# Patient Record
Sex: FEMALE | Race: WHITE | Employment: FULL TIME | URBAN - METROPOLITAN AREA
[De-identification: names, ages, dates, MRNs, and addresses within clinical notes are randomized per-mention and may not be internally consistent; named-entity substitution may affect disease eponyms.]

---

## 2018-07-09 ENCOUNTER — HOSPITAL ENCOUNTER (INPATIENT)
Facility: HOSPITAL | Age: 53
LOS: 5 days | Discharge: HOME | DRG: 853 | End: 2018-07-14
Admitting: HOSPITALIST
Payer: COMMERCIAL

## 2018-07-09 ENCOUNTER — APPOINTMENT (OUTPATIENT)
Dept: CT IMAGING | Facility: HOSPITAL | Age: 53
DRG: 853 | End: 2018-07-09
Payer: COMMERCIAL

## 2018-07-09 DIAGNOSIS — K37 APPENDICITIS: ICD-10-CM

## 2018-07-09 DIAGNOSIS — K35.30 ACUTE APPENDICITIS WITH LOCALIZED PERITONITIS: Primary | ICD-10-CM

## 2018-07-09 PROBLEM — R73.9 HYPERGLYCEMIA: Status: ACTIVE | Noted: 2018-07-09

## 2018-07-09 PROBLEM — K35.32 PERFORATED APPENDICITIS: Status: ACTIVE | Noted: 2018-07-09

## 2018-07-09 LAB
ALBUMIN SERPL BCP-MCNC: 4.2 G/DL (ref 3.5–4.8)
ALBUMIN/GLOB SERPL: 1.5 {RATIO} (ref 1–2)
ALP SERPL-CCNC: 50 U/L (ref 32–100)
ALT SERPL-CCNC: 34 U/L (ref 14–54)
ANION GAP SERPL CALC-SCNC: 13 MMOL/L (ref 0–18)
AST SERPL-CCNC: 28 U/L (ref 15–41)
B-HCG UR QL: NEGATIVE
BACTERIA UR QL AUTO: NEGATIVE /HPF
BASOPHILS # BLD: 0.1 K/UL (ref 0–0.2)
BASOPHILS NFR BLD: 1 %
BILIRUB SERPL-MCNC: 1.2 MG/DL (ref 0.3–1.2)
BILIRUB UR QL: NEGATIVE
BUN SERPL-MCNC: 15 MG/DL (ref 8–20)
BUN/CREAT SERPL: 14.2 (ref 10–20)
CALCIUM SERPL-MCNC: 9.1 MG/DL (ref 8.5–10.5)
CHLORIDE SERPL-SCNC: 102 MMOL/L (ref 95–110)
CLARITY UR: CLEAR
CO2 SERPL-SCNC: 19 MMOL/L (ref 22–32)
COLOR UR: YELLOW
CREAT SERPL-MCNC: 1.06 MG/DL (ref 0.5–1.5)
EOSINOPHIL # BLD: 0 K/UL (ref 0–0.7)
EOSINOPHIL NFR BLD: 0 %
ERYTHROCYTE [DISTWIDTH] IN BLOOD BY AUTOMATED COUNT: 13.1 % (ref 11–15)
GLOBULIN PLAS-MCNC: 2.8 G/DL (ref 2.5–3.7)
GLUCOSE SERPL-MCNC: 144 MG/DL (ref 70–99)
GLUCOSE UR-MCNC: NEGATIVE MG/DL
HCT VFR BLD AUTO: 44.2 % (ref 35–48)
HGB BLD-MCNC: 15.3 G/DL (ref 12–16)
KETONES UR-MCNC: NEGATIVE MG/DL
LEUKOCYTE ESTERASE UR QL STRIP.AUTO: NEGATIVE
LYMPHOCYTES # BLD: 0.9 K/UL (ref 1–4)
LYMPHOCYTES NFR BLD: 5 %
MCH RBC QN AUTO: 30.2 PG (ref 27–32)
MCHC RBC AUTO-ENTMCNC: 34.7 G/DL (ref 32–37)
MCV RBC AUTO: 87 FL (ref 80–100)
MONOCYTES # BLD: 0.8 K/UL (ref 0–1)
MONOCYTES NFR BLD: 5 %
NEUTROPHILS # BLD AUTO: 15 K/UL (ref 1.8–7.7)
NEUTROPHILS NFR BLD: 90 %
NITRITE UR QL STRIP.AUTO: NEGATIVE
OSMOLALITY UR CALC.SUM OF ELEC: 281 MOSM/KG (ref 275–295)
PH UR: 8 [PH] (ref 5–8)
PLATELET # BLD AUTO: 177 K/UL (ref 140–400)
PMV BLD AUTO: 10.4 FL (ref 7.4–10.3)
POTASSIUM SERPL-SCNC: 3.5 MMOL/L (ref 3.3–5.1)
PROT SERPL-MCNC: 7 G/DL (ref 5.9–8.4)
PROT UR-MCNC: 30 MG/DL
RBC # BLD AUTO: 5.08 M/UL (ref 3.7–5.4)
RBC #/AREA URNS AUTO: 15 /HPF
SODIUM SERPL-SCNC: 134 MMOL/L (ref 136–144)
SP GR UR STRIP: 1.02 (ref 1–1.03)
UROBILINOGEN UR STRIP-ACNC: <2
VIT C UR-MCNC: NEGATIVE MG/DL
WBC # BLD AUTO: 16.7 K/UL (ref 4–11)
WBC #/AREA URNS AUTO: <1 /HPF

## 2018-07-09 PROCEDURE — 80053 COMPREHEN METABOLIC PANEL: CPT

## 2018-07-09 PROCEDURE — 96375 TX/PRO/DX INJ NEW DRUG ADDON: CPT

## 2018-07-09 PROCEDURE — 96365 THER/PROPH/DIAG IV INF INIT: CPT

## 2018-07-09 PROCEDURE — 85025 COMPLETE CBC W/AUTO DIFF WBC: CPT

## 2018-07-09 PROCEDURE — 81001 URINALYSIS AUTO W/SCOPE: CPT

## 2018-07-09 PROCEDURE — 74176 CT ABD & PELVIS W/O CONTRAST: CPT

## 2018-07-09 PROCEDURE — 81025 URINE PREGNANCY TEST: CPT

## 2018-07-09 PROCEDURE — 96361 HYDRATE IV INFUSION ADD-ON: CPT

## 2018-07-09 PROCEDURE — 99285 EMERGENCY DEPT VISIT HI MDM: CPT

## 2018-07-09 RX ORDER — DEXTROSE, SODIUM CHLORIDE, AND POTASSIUM CHLORIDE 5; .45; .15 G/100ML; G/100ML; G/100ML
INJECTION INTRAVENOUS CONTINUOUS
Status: DISCONTINUED | OUTPATIENT
Start: 2018-07-10 | End: 2018-07-10

## 2018-07-09 RX ORDER — ACETAMINOPHEN 500 MG
TABLET ORAL
Status: COMPLETED
Start: 2018-07-09 | End: 2018-07-09

## 2018-07-09 RX ORDER — ONDANSETRON 2 MG/ML
4 INJECTION INTRAMUSCULAR; INTRAVENOUS EVERY 4 HOURS PRN
Status: DISCONTINUED | OUTPATIENT
Start: 2018-07-09 | End: 2018-07-10

## 2018-07-09 RX ORDER — ONDANSETRON 2 MG/ML
4 INJECTION INTRAMUSCULAR; INTRAVENOUS EVERY 6 HOURS PRN
Status: DISCONTINUED | OUTPATIENT
Start: 2018-07-09 | End: 2018-07-10

## 2018-07-09 RX ORDER — ONDANSETRON 2 MG/ML
4 INJECTION INTRAMUSCULAR; INTRAVENOUS ONCE
Status: COMPLETED | OUTPATIENT
Start: 2018-07-09 | End: 2018-07-09

## 2018-07-09 RX ORDER — MORPHINE SULFATE 2 MG/ML
3 INJECTION, SOLUTION INTRAMUSCULAR; INTRAVENOUS EVERY 2 HOUR PRN
Status: DISCONTINUED | OUTPATIENT
Start: 2018-07-09 | End: 2018-07-10

## 2018-07-09 RX ORDER — SODIUM CHLORIDE 9 MG/ML
INJECTION, SOLUTION INTRAVENOUS CONTINUOUS
Status: ACTIVE | OUTPATIENT
Start: 2018-07-10 | End: 2018-07-10

## 2018-07-09 RX ORDER — MORPHINE SULFATE 4 MG/ML
4 INJECTION, SOLUTION INTRAMUSCULAR; INTRAVENOUS EVERY 2 HOUR PRN
Status: DISCONTINUED | OUTPATIENT
Start: 2018-07-09 | End: 2018-07-10

## 2018-07-09 RX ORDER — FAMOTIDINE 10 MG/ML
20 INJECTION, SOLUTION INTRAVENOUS EVERY 12 HOURS
Status: DISCONTINUED | OUTPATIENT
Start: 2018-07-10 | End: 2018-07-14

## 2018-07-09 RX ORDER — MORPHINE SULFATE 4 MG/ML
4 INJECTION, SOLUTION INTRAMUSCULAR; INTRAVENOUS ONCE
Status: COMPLETED | OUTPATIENT
Start: 2018-07-09 | End: 2018-07-09

## 2018-07-09 RX ORDER — MORPHINE SULFATE 4 MG/ML
INJECTION, SOLUTION INTRAMUSCULAR; INTRAVENOUS
Status: COMPLETED
Start: 2018-07-09 | End: 2018-07-09

## 2018-07-09 RX ORDER — ONDANSETRON 2 MG/ML
INJECTION INTRAMUSCULAR; INTRAVENOUS
Status: COMPLETED
Start: 2018-07-09 | End: 2018-07-09

## 2018-07-09 RX ORDER — ACETAMINOPHEN 500 MG
1000 TABLET ORAL ONCE
Status: COMPLETED | OUTPATIENT
Start: 2018-07-09 | End: 2018-07-09

## 2018-07-10 ENCOUNTER — ANESTHESIA (OUTPATIENT)
Dept: SURGERY | Facility: HOSPITAL | Age: 53
DRG: 853 | End: 2018-07-10
Payer: COMMERCIAL

## 2018-07-10 ENCOUNTER — ANESTHESIA EVENT (OUTPATIENT)
Dept: SURGERY | Facility: HOSPITAL | Age: 53
DRG: 853 | End: 2018-07-10
Payer: COMMERCIAL

## 2018-07-10 ENCOUNTER — SURGERY (OUTPATIENT)
Age: 53
End: 2018-07-10

## 2018-07-10 LAB
ANION GAP SERPL CALC-SCNC: 8 MMOL/L (ref 0–18)
BASOPHILS # BLD: 0 K/UL (ref 0–0.2)
BASOPHILS NFR BLD: 0 %
BUN SERPL-MCNC: 12 MG/DL (ref 8–20)
BUN/CREAT SERPL: 13.3 (ref 10–20)
CALCIUM SERPL-MCNC: 8.4 MG/DL (ref 8.5–10.5)
CHLORIDE SERPL-SCNC: 102 MMOL/L (ref 95–110)
CO2 SERPL-SCNC: 26 MMOL/L (ref 22–32)
CREAT SERPL-MCNC: 0.9 MG/DL (ref 0.5–1.5)
EOSINOPHIL # BLD: 0 K/UL (ref 0–0.7)
EOSINOPHIL NFR BLD: 0 %
ERYTHROCYTE [DISTWIDTH] IN BLOOD BY AUTOMATED COUNT: 13.3 % (ref 11–15)
GLUCOSE SERPL-MCNC: 140 MG/DL (ref 70–99)
HCT VFR BLD AUTO: 39.2 % (ref 35–48)
HGB BLD-MCNC: 13.3 G/DL (ref 12–16)
LYMPHOCYTES # BLD: 1.4 K/UL (ref 1–4)
LYMPHOCYTES NFR BLD: 9 %
MAGNESIUM SERPL-MCNC: 1.8 MG/DL (ref 1.8–2.5)
MCH RBC QN AUTO: 29.9 PG (ref 27–32)
MCHC RBC AUTO-ENTMCNC: 33.9 G/DL (ref 32–37)
MCV RBC AUTO: 88.2 FL (ref 80–100)
MONOCYTES # BLD: 0.8 K/UL (ref 0–1)
MONOCYTES NFR BLD: 5 %
NEUTROPHILS # BLD AUTO: 12.6 K/UL (ref 1.8–7.7)
NEUTROPHILS NFR BLD: 85 %
OSMOLALITY UR CALC.SUM OF ELEC: 284 MOSM/KG (ref 275–295)
PHOSPHATE SERPL-MCNC: 3.7 MG/DL (ref 2.4–4.7)
PLATELET # BLD AUTO: 136 K/UL (ref 140–400)
PMV BLD AUTO: 10.4 FL (ref 7.4–10.3)
POTASSIUM SERPL-SCNC: 4.2 MMOL/L (ref 3.3–5.1)
RBC # BLD AUTO: 4.45 M/UL (ref 3.7–5.4)
SODIUM SERPL-SCNC: 136 MMOL/L (ref 136–144)
WBC # BLD AUTO: 14.7 K/UL (ref 4–11)

## 2018-07-10 PROCEDURE — 83735 ASSAY OF MAGNESIUM: CPT | Performed by: SURGERY

## 2018-07-10 PROCEDURE — 0DTJ4ZZ RESECTION OF APPENDIX, PERCUTANEOUS ENDOSCOPIC APPROACH: ICD-10-PCS | Performed by: SURGERY

## 2018-07-10 PROCEDURE — 84100 ASSAY OF PHOSPHORUS: CPT | Performed by: SURGERY

## 2018-07-10 PROCEDURE — 88304 TISSUE EXAM BY PATHOLOGIST: CPT | Performed by: SURGERY

## 2018-07-10 PROCEDURE — S0028 INJECTION, FAMOTIDINE, 20 MG: HCPCS | Performed by: SURGERY

## 2018-07-10 PROCEDURE — 0DBH4ZZ EXCISION OF CECUM, PERCUTANEOUS ENDOSCOPIC APPROACH: ICD-10-PCS | Performed by: SURGERY

## 2018-07-10 PROCEDURE — 87076 CULTURE ANAEROBE IDENT EACH: CPT | Performed by: SURGERY

## 2018-07-10 PROCEDURE — 87070 CULTURE OTHR SPECIMN AEROBIC: CPT | Performed by: SURGERY

## 2018-07-10 PROCEDURE — 85025 COMPLETE CBC W/AUTO DIFF WBC: CPT | Performed by: SURGERY

## 2018-07-10 PROCEDURE — 87205 SMEAR GRAM STAIN: CPT | Performed by: SURGERY

## 2018-07-10 PROCEDURE — 80048 BASIC METABOLIC PNL TOTAL CA: CPT | Performed by: SURGERY

## 2018-07-10 PROCEDURE — A4216 STERILE WATER/SALINE, 10 ML: HCPCS

## 2018-07-10 PROCEDURE — 87075 CULTR BACTERIA EXCEPT BLOOD: CPT | Performed by: SURGERY

## 2018-07-10 PROCEDURE — 0W9H40Z DRAINAGE OF RETROPERITONEUM WITH DRAINAGE DEVICE, PERCUTANEOUS ENDOSCOPIC APPROACH: ICD-10-PCS | Performed by: SURGERY

## 2018-07-10 RX ORDER — ONDANSETRON 2 MG/ML
8 INJECTION INTRAMUSCULAR; INTRAVENOUS EVERY 6 HOURS PRN
Status: DISCONTINUED | OUTPATIENT
Start: 2018-07-10 | End: 2018-07-14

## 2018-07-10 RX ORDER — HEPARIN SODIUM 1000 [USP'U]/ML
INJECTION, SOLUTION INTRAVENOUS; SUBCUTANEOUS AS NEEDED
Status: DISCONTINUED | OUTPATIENT
Start: 2018-07-10 | End: 2018-07-10 | Stop reason: HOSPADM

## 2018-07-10 RX ORDER — HYDROMORPHONE HYDROCHLORIDE 1 MG/ML
0.4 INJECTION, SOLUTION INTRAMUSCULAR; INTRAVENOUS; SUBCUTANEOUS EVERY 5 MIN PRN
Status: DISCONTINUED | OUTPATIENT
Start: 2018-07-10 | End: 2018-07-10 | Stop reason: HOSPADM

## 2018-07-10 RX ORDER — ROCURONIUM BROMIDE 10 MG/ML
INJECTION, SOLUTION INTRAVENOUS AS NEEDED
Status: DISCONTINUED | OUTPATIENT
Start: 2018-07-10 | End: 2018-07-10 | Stop reason: SURG

## 2018-07-10 RX ORDER — DEXTROSE, SODIUM CHLORIDE, AND POTASSIUM CHLORIDE 5; .45; .15 G/100ML; G/100ML; G/100ML
INJECTION INTRAVENOUS CONTINUOUS
Status: DISCONTINUED | OUTPATIENT
Start: 2018-07-10 | End: 2018-07-14

## 2018-07-10 RX ORDER — SODIUM CHLORIDE, SODIUM LACTATE, POTASSIUM CHLORIDE, CALCIUM CHLORIDE 600; 310; 30; 20 MG/100ML; MG/100ML; MG/100ML; MG/100ML
INJECTION, SOLUTION INTRAVENOUS CONTINUOUS
Status: DISCONTINUED | OUTPATIENT
Start: 2018-07-10 | End: 2018-07-10 | Stop reason: HOSPADM

## 2018-07-10 RX ORDER — ACETAMINOPHEN 10 MG/ML
1000 INJECTION, SOLUTION INTRAVENOUS EVERY 6 HOURS
Status: DISCONTINUED | OUTPATIENT
Start: 2018-07-10 | End: 2018-07-10

## 2018-07-10 RX ORDER — ONDANSETRON 2 MG/ML
4 INJECTION INTRAMUSCULAR; INTRAVENOUS ONCE AS NEEDED
Status: DISCONTINUED | OUTPATIENT
Start: 2018-07-10 | End: 2018-07-10 | Stop reason: HOSPADM

## 2018-07-10 RX ORDER — SODIUM CHLORIDE, SODIUM LACTATE, POTASSIUM CHLORIDE, CALCIUM CHLORIDE 600; 310; 30; 20 MG/100ML; MG/100ML; MG/100ML; MG/100ML
INJECTION, SOLUTION INTRAVENOUS CONTINUOUS PRN
Status: DISCONTINUED | OUTPATIENT
Start: 2018-07-10 | End: 2018-07-10 | Stop reason: SURG

## 2018-07-10 RX ORDER — NEOSTIGMINE METHYLSULFATE 0.5 MG/ML
INJECTION INTRAVENOUS AS NEEDED
Status: DISCONTINUED | OUTPATIENT
Start: 2018-07-10 | End: 2018-07-10 | Stop reason: SURG

## 2018-07-10 RX ORDER — METOCLOPRAMIDE HYDROCHLORIDE 5 MG/ML
10 INJECTION INTRAMUSCULAR; INTRAVENOUS EVERY 8 HOURS PRN
Status: DISCONTINUED | OUTPATIENT
Start: 2018-07-10 | End: 2018-07-14

## 2018-07-10 RX ORDER — MIDAZOLAM HYDROCHLORIDE 1 MG/ML
INJECTION INTRAMUSCULAR; INTRAVENOUS AS NEEDED
Status: DISCONTINUED | OUTPATIENT
Start: 2018-07-10 | End: 2018-07-10 | Stop reason: SURG

## 2018-07-10 RX ORDER — HYDROCODONE BITARTRATE AND ACETAMINOPHEN 5; 325 MG/1; MG/1
1 TABLET ORAL AS NEEDED
Status: DISCONTINUED | OUTPATIENT
Start: 2018-07-10 | End: 2018-07-10 | Stop reason: HOSPADM

## 2018-07-10 RX ORDER — HYDROMORPHONE HYDROCHLORIDE 1 MG/ML
0.2 INJECTION, SOLUTION INTRAMUSCULAR; INTRAVENOUS; SUBCUTANEOUS EVERY 5 MIN PRN
Status: DISCONTINUED | OUTPATIENT
Start: 2018-07-10 | End: 2018-07-10 | Stop reason: HOSPADM

## 2018-07-10 RX ORDER — ACETAMINOPHEN 10 MG/ML
1000 INJECTION, SOLUTION INTRAVENOUS EVERY 6 HOURS
Status: DISCONTINUED | OUTPATIENT
Start: 2018-07-10 | End: 2018-07-14

## 2018-07-10 RX ORDER — DEXAMETHASONE SODIUM PHOSPHATE 4 MG/ML
VIAL (ML) INJECTION AS NEEDED
Status: DISCONTINUED | OUTPATIENT
Start: 2018-07-10 | End: 2018-07-10 | Stop reason: SURG

## 2018-07-10 RX ORDER — HEPARIN SODIUM 5000 [USP'U]/ML
5000 INJECTION, SOLUTION INTRAVENOUS; SUBCUTANEOUS EVERY 12 HOURS SCHEDULED
Status: DISCONTINUED | OUTPATIENT
Start: 2018-07-10 | End: 2018-07-14

## 2018-07-10 RX ORDER — NALOXONE HYDROCHLORIDE 0.4 MG/ML
80 INJECTION, SOLUTION INTRAMUSCULAR; INTRAVENOUS; SUBCUTANEOUS AS NEEDED
Status: DISCONTINUED | OUTPATIENT
Start: 2018-07-10 | End: 2018-07-10 | Stop reason: HOSPADM

## 2018-07-10 RX ORDER — GLYCOPYRROLATE 0.2 MG/ML
INJECTION INTRAMUSCULAR; INTRAVENOUS AS NEEDED
Status: DISCONTINUED | OUTPATIENT
Start: 2018-07-10 | End: 2018-07-10 | Stop reason: SURG

## 2018-07-10 RX ORDER — HYDROCODONE BITARTRATE AND ACETAMINOPHEN 5; 325 MG/1; MG/1
2 TABLET ORAL AS NEEDED
Status: DISCONTINUED | OUTPATIENT
Start: 2018-07-10 | End: 2018-07-10 | Stop reason: HOSPADM

## 2018-07-10 RX ORDER — ACETAMINOPHEN 10 MG/ML
INJECTION, SOLUTION INTRAVENOUS AS NEEDED
Status: DISCONTINUED | OUTPATIENT
Start: 2018-07-10 | End: 2018-07-10 | Stop reason: SURG

## 2018-07-10 RX ORDER — HYDROMORPHONE HYDROCHLORIDE 1 MG/ML
0.6 INJECTION, SOLUTION INTRAMUSCULAR; INTRAVENOUS; SUBCUTANEOUS EVERY 5 MIN PRN
Status: DISCONTINUED | OUTPATIENT
Start: 2018-07-10 | End: 2018-07-10 | Stop reason: HOSPADM

## 2018-07-10 RX ORDER — HALOPERIDOL 5 MG/ML
0.25 INJECTION INTRAMUSCULAR ONCE AS NEEDED
Status: DISCONTINUED | OUTPATIENT
Start: 2018-07-10 | End: 2018-07-10 | Stop reason: HOSPADM

## 2018-07-10 RX ORDER — ONDANSETRON 2 MG/ML
4 INJECTION INTRAMUSCULAR; INTRAVENOUS EVERY 6 HOURS PRN
Status: DISCONTINUED | OUTPATIENT
Start: 2018-07-10 | End: 2018-07-14

## 2018-07-10 RX ORDER — SODIUM CHLORIDE 0.9 % (FLUSH) 0.9 %
3 SYRINGE (ML) INJECTION AS NEEDED
Status: DISCONTINUED | OUTPATIENT
Start: 2018-07-10 | End: 2018-07-14

## 2018-07-10 RX ORDER — SODIUM CHLORIDE 9 MG/ML
INJECTION, SOLUTION INTRAVENOUS
Status: COMPLETED
Start: 2018-07-10 | End: 2018-07-10

## 2018-07-10 RX ORDER — BUPIVACAINE HYDROCHLORIDE AND EPINEPHRINE 2.5; 5 MG/ML; UG/ML
INJECTION, SOLUTION INFILTRATION; PERINEURAL AS NEEDED
Status: DISCONTINUED | OUTPATIENT
Start: 2018-07-10 | End: 2018-07-10 | Stop reason: HOSPADM

## 2018-07-10 RX ADMIN — ROCURONIUM BROMIDE 50 MG: 10 INJECTION, SOLUTION INTRAVENOUS at 09:31:00

## 2018-07-10 RX ADMIN — SODIUM CHLORIDE, SODIUM LACTATE, POTASSIUM CHLORIDE, CALCIUM CHLORIDE: 600; 310; 30; 20 INJECTION, SOLUTION INTRAVENOUS at 09:21:00

## 2018-07-10 RX ADMIN — ACETAMINOPHEN 1000 MG: 10 INJECTION, SOLUTION INTRAVENOUS at 10:26:00

## 2018-07-10 RX ADMIN — GLYCOPYRROLATE 0.8 MG: 0.2 INJECTION INTRAMUSCULAR; INTRAVENOUS at 10:30:00

## 2018-07-10 RX ADMIN — MIDAZOLAM HYDROCHLORIDE 2 MG: 1 INJECTION INTRAMUSCULAR; INTRAVENOUS at 09:21:00

## 2018-07-10 RX ADMIN — ROCURONIUM BROMIDE 10 MG: 10 INJECTION, SOLUTION INTRAVENOUS at 10:04:00

## 2018-07-10 RX ADMIN — ONDANSETRON 4 MG: 2 INJECTION INTRAMUSCULAR; INTRAVENOUS at 10:19:00

## 2018-07-10 RX ADMIN — DEXAMETHASONE SODIUM PHOSPHATE 4 MG: 4 MG/ML VIAL (ML) INJECTION at 09:44:00

## 2018-07-10 RX ADMIN — NEOSTIGMINE METHYLSULFATE 5 MG: 0.5 INJECTION INTRAVENOUS at 10:30:00

## 2018-07-10 NOTE — CONSULTS
[facility]    Report of Consultation    Kvng Xie Patient Status:  Inpatient    1965 MRN [id number]   Location [facility] PRE OP RECOVERY Attending Ann Marie Garcia MD   Hosp Day # 1 PCP Erica Cuevas MD     Date of Admis [MAR Hold] ondansetron HCl (ZOFRAN) injection 4 mg, 4 mg, Intravenous, Q4H PRN  •  dextrose 5 % and 0.45 % NaCl with KCl 20 mEq infusion, , Intravenous, Continuous  •  [MAR Hold] famoTIDine (PEPCID) injection 20 mg, 20 mg, Intravenous, 2 times per day  • 07/09/2018   ALT 34 07/09/2018   INR 1.0 10/16/2008   T4F 0.81 01/26/2016   TSH 2.630 04/06/2016   MG 1.8 07/10/2018   PHOS 3.7 07/10/2018       Ct Abdomen Kidney Stone (no Iv) Em ([id number])    Result Date: 7/9/2018  CONCLUSION:  1.  Acute appendicitis wit hollow viscus, vascular injury, need to convert to an open procedure, 5-10% incidence of normal appendix at the time of exploration, as well as risk of anesthesia including myocardial infarction, respiratory failure, renal failure, pulmonary and was, DVT,

## 2018-07-10 NOTE — ANESTHESIA PROCEDURE NOTES
Airway  Date/Time: 7/10/2018 9:25 AM  Urgency: elective      General Information and Staff    Patient location during procedure: OR  Anesthesiologist: SUKUMAR Juarez  Performed: anesthesiologist     Indications and Patient Condition  Indications for airway ma

## 2018-07-10 NOTE — ED PROVIDER NOTES
Patient Seen in: [facility] Emergency Department    History   Patient presents with:  Abdomen/Flank Pain (GI/)      HPI    The patient presents to the ED complaining of right lower quadrant abdominal pain that started this morning.   She states p drinks or equivalent: 3 per week    Drug use: No            Other Topics            Concern  Seat Belt               Yes        ROS  Pertinent Positives: Abdominal pain, fever  All other organ systems are reviewed and are negative.     Constitutional and vi 10.4 (*)     Neutrophil Absolute 15.0 (*)     Lymphocyte Absolute 0.9 (*)     All other components within normal limits   EMH POCT PREGNANCY URINE - Normal   CBC WITH DIFFERENTIAL WITH PLATELET    Narrative:      The following orders were created for panel (PEPCID) injection 20 mg (20 mg Intravenous Given 7/10/18 0012)   morphINE sulfate (PF) 4 MG/ML injection 4 mg (4 mg Intravenous Given 7/10/18 0006)   morphINE sulfate (PF) 2 MG/ML injection 3 mg (not administered)   ondansetron HCl (ZOFRAN) injection 4 mg her problem list. to contribute to the complexity of this ED evaluation. ED Course: Patient presents to the ED with right lower quadrant abdominal pain and fever. Concern clinically for acute appendicitis.   Laboratory testing sent, patient sent for CT

## 2018-07-10 NOTE — OPERATIVE REPORT
[facility] OPERATING ROOM OPERATIVE REPORT:     PATIENT NAME: Bambi Bronson  : 1965   MRN: [id number]  SITE: [facility]    DATE OF OPERATION:   7/10/2018     PREOPERATIVE DIAGNOSIS:  acute appendicitis         POSTOPERATIVE DIAGNOSIS:  Acute appen appendectomy, at [facility] emergently today. Operation:  The patient was brought to the operating room and placed in the supine position. General anesthetic was administered via endotracheal tube by anesthesia.   . The patient's stomach wa approximately 15 mmHg. The right lower quadrant was irrigated and aspirated, copious amounts of saline solution. No active bleeding is noted to be present.   The staple line of the appendiceal stump as well as mesoappendix would be intact with no bleeding

## 2018-07-10 NOTE — H&P
[facility] Hospitalist H&P     CC: Patient presents with:  Abdomen/Flank Pain (GI/)       PCP: Ela Zabala MD      Assessment and Plan     Bambi Bronson is a 48year old female with PMH sig for depression, HL, and prior symptomatic AV block with extensive n PMH  Past Medical History:   Diagnosis Date   • DEPRESSION    • HERPES SIMPLEX     shingles on right upper arm, genital, not oral   • HYPERLIPIDEMIA    • NEUROCARDIOGENIC DISEASE DX IN 11/08   • Other and unspecified hyperlipidemia         PSH  Past Person 07/10/18   0553   NA  134*  136   K  3.5  4.2   CL  102  102   CO2  19*  26   BUN  15  12   CREATSERUM  1.06  0.90   GLU  144*  140*   CA  9.1  8.4*   MG   --   1.8   PHOS   --   3.7       Recent Labs   Lab  07/09/18 2011   ALT  34   AST  28   ALB  4.2 present. PANCREAS: Negative unenhanced appearance for fluid collection, ductal dilatation, or atrophy. SPLEEN: Markedly enlarged, measuring 14.9 cm. Small splenules are present in the left upper quadrant. ADRENALS:   No abnormal mass or enlargement.  GI/ME

## 2018-07-10 NOTE — BRIEF OP NOTE
Pre-Operative Diagnosis: acute Appendicitis [K37]     Post-Operative Diagnosis: acute Appendicitis with perforated abscess       Procedure Performed:   Procedure(s):  LAPAROSCOPIC APPENDECTOMY, lap cecectomy  with drainage of retroperotineal abscess     Person

## 2018-07-10 NOTE — PROGRESS NOTES
[facility] Dosing Service  Antibiotic Dosing    Geoff Mosley is a 48year old female for whom pharmacy is dosing Meropenem for treatment of  surgical prophylaxis. .  Other antibiotics (Not dosed by pharmacy):      Allergies: is allergic to pcns [penicil

## 2018-07-10 NOTE — ANESTHESIA POSTPROCEDURE EVALUATION
Patient: Jacinto Rueda    Procedure Summary     Date:  07/10/18 Room / Location:  [facility] OR  / [facility] OR    Anesthesia Start:  4432 Anesthesia Stop:      Procedure:  LAPAROSCOPIC APPENDECTOMY (N/A Abdomen) Diagnosis:       Appendicitis      (acute A

## 2018-07-10 NOTE — ANESTHESIA PREPROCEDURE EVALUATION
Anesthesia PreOp Note    HPI:     Shantelle Ocasio is a 48year old female who presents for preoperative consultation requested by: Carlos Guzmán MD    Date of Surgery: 7/9/2018 - 7/10/2018    Procedure(s):  LAPAROSCOPIC APPENDECTOMY  Indication: Óscar Lou injection 4 mg 4 mg Intravenous Q4H PRN Carla Krause MD     dextrose 5 % and 0.45 % NaCl with KCl 20 mEq infusion  Intravenous Continuous Alejandro Ríos MD Last Rate: 100 mL/hr at 07/10/18 0006    [MAR Hold] famoTIDine (PEPCID) injection 20 mg 2 07/10/2018   MCHC 33.9 07/10/2018   RDW 13.3 07/10/2018    (L) 07/10/2018   MPV 10.4 (H) 07/10/2018   URINEPREG Negative 07/09/2018       Lab Results  Component Value Date    07/10/2018   K 4.2 07/10/2018    07/10/2018   CO2 26 07/10/201 GA/PONV,dentaldagestec      I have informed Sharron Cintron and/or legal guardian or family member of the nature of the anesthetic plan, benefits, risks including possible dental damage if relevant, major complications, and any alternative forms of anesthet

## 2018-07-11 LAB
ANION GAP SERPL CALC-SCNC: 5 MMOL/L (ref 0–18)
BASOPHILS # BLD: 0 K/UL (ref 0–0.2)
BASOPHILS NFR BLD: 0 %
BUN SERPL-MCNC: 7 MG/DL (ref 8–20)
BUN/CREAT SERPL: 9.7 (ref 10–20)
CALCIUM SERPL-MCNC: 8.1 MG/DL (ref 8.5–10.5)
CHLORIDE SERPL-SCNC: 107 MMOL/L (ref 95–110)
CO2 SERPL-SCNC: 24 MMOL/L (ref 22–32)
CREAT SERPL-MCNC: 0.72 MG/DL (ref 0.5–1.5)
EOSINOPHIL # BLD: 0 K/UL (ref 0–0.7)
EOSINOPHIL NFR BLD: 0 %
ERYTHROCYTE [DISTWIDTH] IN BLOOD BY AUTOMATED COUNT: 13.3 % (ref 11–15)
GLUCOSE SERPL-MCNC: 158 MG/DL (ref 70–99)
HCT VFR BLD AUTO: 35.9 % (ref 35–48)
HGB BLD-MCNC: 12.6 G/DL (ref 12–16)
LYMPHOCYTES # BLD: 0.8 K/UL (ref 1–4)
LYMPHOCYTES NFR BLD: 8 %
MAGNESIUM SERPL-MCNC: 1.9 MG/DL (ref 1.8–2.5)
MCH RBC QN AUTO: 31 PG (ref 27–32)
MCHC RBC AUTO-ENTMCNC: 35 G/DL (ref 32–37)
MCV RBC AUTO: 88.4 FL (ref 80–100)
MONOCYTES # BLD: 0.5 K/UL (ref 0–1)
MONOCYTES NFR BLD: 6 %
NEUTROPHILS # BLD AUTO: 8.6 K/UL (ref 1.8–7.7)
NEUTROPHILS NFR BLD: 87 %
OSMOLALITY UR CALC.SUM OF ELEC: 283 MOSM/KG (ref 275–295)
PHOSPHATE SERPL-MCNC: 2.3 MG/DL (ref 2.4–4.7)
PLATELET # BLD AUTO: 122 K/UL (ref 140–400)
PMV BLD AUTO: 9.7 FL (ref 7.4–10.3)
POTASSIUM SERPL-SCNC: 4.1 MMOL/L (ref 3.3–5.1)
RBC # BLD AUTO: 4.06 M/UL (ref 3.7–5.4)
SODIUM SERPL-SCNC: 136 MMOL/L (ref 136–144)
WBC # BLD AUTO: 9.9 K/UL (ref 4–11)

## 2018-07-11 PROCEDURE — 80048 BASIC METABOLIC PNL TOTAL CA: CPT | Performed by: SURGERY

## 2018-07-11 PROCEDURE — A4216 STERILE WATER/SALINE, 10 ML: HCPCS | Performed by: HOSPITALIST

## 2018-07-11 PROCEDURE — 84100 ASSAY OF PHOSPHORUS: CPT | Performed by: SURGERY

## 2018-07-11 PROCEDURE — S0028 INJECTION, FAMOTIDINE, 20 MG: HCPCS | Performed by: SURGERY

## 2018-07-11 PROCEDURE — 83735 ASSAY OF MAGNESIUM: CPT | Performed by: SURGERY

## 2018-07-11 PROCEDURE — 85025 COMPLETE CBC W/AUTO DIFF WBC: CPT | Performed by: SURGERY

## 2018-07-11 RX ORDER — SODIUM CHLORIDE 9 MG/ML
INJECTION, SOLUTION INTRAVENOUS
Status: COMPLETED
Start: 2018-07-11 | End: 2018-07-11

## 2018-07-11 NOTE — PROGRESS NOTES
[facility] Hospitalist Progress Note     PCP: Roman Ambriz MD    CC: Follow up       Assessment/Plan:     Shantelle Ocasio is a 48year old female with PMH sig for depression, HL, and prior symptomatic AV block with extensive negative cardiac work up starting 200 kg)  02/19/16 0805 : 241 lb (109.3 kg)  01/21/16 1800 : 240 lb 14.4 oz (109.3 kg)      Exam  Gen: No acute distress, alert and oriented x3, NGT  Neck Supple, no JVD  Pulm: Lungs clear, normal respiratory effort, No wheezing or crackles  CV: Heart with regu Hepatomegaly with underlying hepatic steatosis. 5. Splenomegaly. 6. Lesser incidental findings as above. Results of this examination were discussed with the patient's physician, Dr. Hillary Dickerson, by Dr. Rogelio Ham at 2107 on 07/09/2018.    Dictated by (CST): Pawan

## 2018-07-11 NOTE — CM/SW NOTE
7/11CM- MD orders received in regards to discharge planning. The Patient was seen at bedside. The Patient resides with her  in [place]  in single family home.   Prior to hospitalization, the patient was driving,  doing grocery shopping, errands,

## 2018-07-11 NOTE — PROGRESS NOTES
[place] [facility] - [facility]    Progress Note    Shantelle Ocasio Patient Status:  Inpatient    1965 MRN [id number]   Location [facility] 4W/SW/SE Attending Jelani Lovell MD   Hosp Day # 2 PCP Roman Ambriz MD       Subjective:   Fly Siu 8.1*   ALB  4.2   --    --    NA  134*  136  136   K  3.5  4.2  4.1   CL  102  102  107   CO2  19*  26  24   ALKPHO  50   --    --    AST  28   --    --    ALT  34   --    --    BILT  1.2   --    --    TP  7.0   --    --              Ct Abdomen Kidney Sto

## 2018-07-12 LAB
ANION GAP SERPL CALC-SCNC: 4 MMOL/L (ref 0–18)
BASOPHILS # BLD: 0 K/UL (ref 0–0.2)
BASOPHILS NFR BLD: 0 %
BUN SERPL-MCNC: 9 MG/DL (ref 8–20)
BUN/CREAT SERPL: 9.2 (ref 10–20)
CALCIUM SERPL-MCNC: 8.4 MG/DL (ref 8.5–10.5)
CHLORIDE SERPL-SCNC: 107 MMOL/L (ref 95–110)
CO2 SERPL-SCNC: 28 MMOL/L (ref 22–32)
CREAT SERPL-MCNC: 0.98 MG/DL (ref 0.5–1.5)
EOSINOPHIL # BLD: 0.1 K/UL (ref 0–0.7)
EOSINOPHIL NFR BLD: 1 %
ERYTHROCYTE [DISTWIDTH] IN BLOOD BY AUTOMATED COUNT: 13.4 % (ref 11–15)
GLUCOSE SERPL-MCNC: 104 MG/DL (ref 70–99)
HCT VFR BLD AUTO: 36.5 % (ref 35–48)
HGB BLD-MCNC: 12.4 G/DL (ref 12–16)
LYMPHOCYTES # BLD: 1.8 K/UL (ref 1–4)
LYMPHOCYTES NFR BLD: 25 %
MAGNESIUM SERPL-MCNC: 2 MG/DL (ref 1.8–2.5)
MCH RBC QN AUTO: 30.6 PG (ref 27–32)
MCHC RBC AUTO-ENTMCNC: 34 G/DL (ref 32–37)
MCV RBC AUTO: 90 FL (ref 80–100)
MONOCYTES # BLD: 0.4 K/UL (ref 0–1)
MONOCYTES NFR BLD: 6 %
NEUTROPHILS # BLD AUTO: 4.7 K/UL (ref 1.8–7.7)
NEUTROPHILS NFR BLD: 67 %
OSMOLALITY UR CALC.SUM OF ELEC: 287 MOSM/KG (ref 275–295)
PHOSPHATE SERPL-MCNC: 2.9 MG/DL (ref 2.4–4.7)
PLATELET # BLD AUTO: 133 K/UL (ref 140–400)
PMV BLD AUTO: 10 FL (ref 7.4–10.3)
POTASSIUM SERPL-SCNC: 4.5 MMOL/L (ref 3.3–5.1)
RBC # BLD AUTO: 4.06 M/UL (ref 3.7–5.4)
SODIUM SERPL-SCNC: 139 MMOL/L (ref 136–144)
WBC # BLD AUTO: 7 K/UL (ref 4–11)

## 2018-07-12 PROCEDURE — 83735 ASSAY OF MAGNESIUM: CPT | Performed by: SURGERY

## 2018-07-12 PROCEDURE — 85025 COMPLETE CBC W/AUTO DIFF WBC: CPT | Performed by: SURGERY

## 2018-07-12 PROCEDURE — 84100 ASSAY OF PHOSPHORUS: CPT | Performed by: SURGERY

## 2018-07-12 PROCEDURE — A4216 STERILE WATER/SALINE, 10 ML: HCPCS | Performed by: HOSPITALIST

## 2018-07-12 PROCEDURE — 80048 BASIC METABOLIC PNL TOTAL CA: CPT | Performed by: SURGERY

## 2018-07-12 PROCEDURE — S0028 INJECTION, FAMOTIDINE, 20 MG: HCPCS | Performed by: SURGERY

## 2018-07-12 NOTE — PROGRESS NOTES
[place] [facility] - [facility]    Progress Note    Aliya Tang Patient Status:  Inpatient    1965 MRN [id number]   Location [facility] 4W/SW/SE Attending Moisés Conley MD   Hosp Day # 3 PCP Papa Hewitt MD       Subjective:   Kayy Majano 9.1  8.4*  8.1*  8.4*   ALB  4.2   --    --    --    NA  134*  136  136  139   K  3.5  4.2  4.1  4.5   CL  102  102  107  107   CO2  19*  26  24  28   ALKPHO  50   --    --    --    AST  28   --    --    --    ALT  34   --    --    --    BILT  1.2   --

## 2018-07-12 NOTE — PROGRESS NOTES
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 · Acellular mucin extends to the serosa at the proximal surgical margin.

## 2018-07-12 NOTE — PROGRESS NOTES
[facility] Hospitalist Progress Note     PCP: Shaun Gonzales MD    CC: Follow up       Assessment/Plan:     Isabel Harris is a 48year old female with PMH sig for depression, HL, and prior symptomatic AV block with extensive negative cardiac work up starting 200 268 ml       Last 3 Weights  07/09/18 1954 : 240 lb (108.9 kg)  02/19/16 0805 : 241 lb (109.3 kg)  01/21/16 1800 : 240 lb 14.4 oz (109.3 kg)      Exam  Gen: No acute distress, alert and oriented x3, NGT  Neck Supple, no JVD  Pulm: Lungs clear, normal

## 2018-07-13 LAB
ANION GAP SERPL CALC-SCNC: 4 MMOL/L (ref 0–18)
BASOPHILS # BLD: 0 K/UL (ref 0–0.2)
BASOPHILS NFR BLD: 1 %
BUN SERPL-MCNC: 6 MG/DL (ref 8–20)
BUN/CREAT SERPL: 5.4 (ref 10–20)
CALCIUM SERPL-MCNC: 8.7 MG/DL (ref 8.5–10.5)
CEA SERPL-MCNC: 0.5 NG/ML (ref 0–5)
CHLORIDE SERPL-SCNC: 108 MMOL/L (ref 95–110)
CO2 SERPL-SCNC: 30 MMOL/L (ref 22–32)
CREAT SERPL-MCNC: 1.11 MG/DL (ref 0.5–1.5)
EOSINOPHIL # BLD: 0.1 K/UL (ref 0–0.7)
EOSINOPHIL NFR BLD: 3 %
ERYTHROCYTE [DISTWIDTH] IN BLOOD BY AUTOMATED COUNT: 13.2 % (ref 11–15)
GLUCOSE SERPL-MCNC: 98 MG/DL (ref 70–99)
HCT VFR BLD AUTO: 38.2 % (ref 35–48)
HGB BLD-MCNC: 13.1 G/DL (ref 12–16)
LYMPHOCYTES # BLD: 1.7 K/UL (ref 1–4)
LYMPHOCYTES NFR BLD: 35 %
MAGNESIUM SERPL-MCNC: 1.9 MG/DL (ref 1.8–2.5)
MCH RBC QN AUTO: 30.6 PG (ref 27–32)
MCHC RBC AUTO-ENTMCNC: 34.2 G/DL (ref 32–37)
MCV RBC AUTO: 89.5 FL (ref 80–100)
MONOCYTES # BLD: 0.3 K/UL (ref 0–1)
MONOCYTES NFR BLD: 7 %
NEUTROPHILS # BLD AUTO: 2.6 K/UL (ref 1.8–7.7)
NEUTROPHILS NFR BLD: 55 %
OSMOLALITY UR CALC.SUM OF ELEC: 292 MOSM/KG (ref 275–295)
PHOSPHATE SERPL-MCNC: 3.9 MG/DL (ref 2.4–4.7)
PLATELET # BLD AUTO: 170 K/UL (ref 140–400)
PMV BLD AUTO: 9.9 FL (ref 7.4–10.3)
POTASSIUM SERPL-SCNC: 3.5 MMOL/L (ref 3.3–5.1)
RBC # BLD AUTO: 4.27 M/UL (ref 3.7–5.4)
SODIUM SERPL-SCNC: 142 MMOL/L (ref 136–144)
WBC # BLD AUTO: 4.8 K/UL (ref 4–11)

## 2018-07-13 PROCEDURE — 80048 BASIC METABOLIC PNL TOTAL CA: CPT | Performed by: SURGERY

## 2018-07-13 PROCEDURE — 83735 ASSAY OF MAGNESIUM: CPT | Performed by: SURGERY

## 2018-07-13 PROCEDURE — 85025 COMPLETE CBC W/AUTO DIFF WBC: CPT | Performed by: SURGERY

## 2018-07-13 PROCEDURE — 82378 CARCINOEMBRYONIC ANTIGEN: CPT | Performed by: SURGERY

## 2018-07-13 PROCEDURE — 84100 ASSAY OF PHOSPHORUS: CPT | Performed by: SURGERY

## 2018-07-13 PROCEDURE — S0028 INJECTION, FAMOTIDINE, 20 MG: HCPCS | Performed by: SURGERY

## 2018-07-13 PROCEDURE — A4216 STERILE WATER/SALINE, 10 ML: HCPCS | Performed by: HOSPITALIST

## 2018-07-13 RX ORDER — HYDROCODONE BITARTRATE AND ACETAMINOPHEN 10; 325 MG/1; MG/1
1 TABLET ORAL EVERY 4 HOURS PRN
Status: DISCONTINUED | OUTPATIENT
Start: 2018-07-13 | End: 2018-07-14

## 2018-07-13 RX ORDER — BISACODYL 10 MG
10 SUPPOSITORY, RECTAL RECTAL
Status: DISCONTINUED | OUTPATIENT
Start: 2018-07-13 | End: 2018-07-14

## 2018-07-13 RX ORDER — DOCUSATE SODIUM 100 MG/1
100 CAPSULE, LIQUID FILLED ORAL 2 TIMES DAILY
Status: DISCONTINUED | OUTPATIENT
Start: 2018-07-13 | End: 2018-07-14

## 2018-07-13 RX ORDER — POLYETHYLENE GLYCOL 3350 17 G/17G
17 POWDER, FOR SOLUTION ORAL DAILY PRN
Status: DISCONTINUED | OUTPATIENT
Start: 2018-07-13 | End: 2018-07-14

## 2018-07-13 NOTE — PROGRESS NOTES
[place] [facility] - [facility]    Progress Note    Alisa Jaime Patient Status:  Inpatient    1965 MRN [id number]   Location [facility] 4W/SW/SE Attending Lucy Elliott MD   [facility] Day # 4 PCP Fredo Jaimes MD       Subjective:   Annika Almodovar Results  Component Value Date   INR 1.0 10/16/2008       Recent Labs   Lab  07/09/18 2011 07/11/18   0529  07/12/18   0543  07/13/18   0530   GLU  144*   < >  158*  104*  98   BUN  15   < >  7*  9  6*   CREATSERUM  1.06   < >  0.72  0.98  1.11   GFRAA

## 2018-07-13 NOTE — PROGRESS NOTES
[facility] Hospitalist Progress Note     PCP: Judah Jesus MD    CC: Follow up       Assessment/Plan:     Prerna Romero is a 48year old female with PMH sig for depression, HL, and prior symptomatic AV block with extensive negative cardiac work up starting 200 135/59    Intake/Output:    Intake/Output Summary (Last 24 hours) at 07/13/18 1533  Last data filed at 07/13/18 1007   Gross per 24 hour   Intake             1759 ml   Output             2195 ml   Net             -436 ml       Last 3 Weights  07/13/18 0504 --   3.7  2.3*  2.9  3.9   GLU  144*  140*  158*  104*  98       Recent Labs   Lab  07/09/18 2011   ALT  34   AST  28   ALB  4.2       No results for input(s): PGLU in the last 168 hours. No results for input(s): TROP in the last 168 hours.     Harmeet Michael

## 2018-07-14 VITALS
RESPIRATION RATE: 18 BRPM | OXYGEN SATURATION: 97 % | TEMPERATURE: 98 F | HEIGHT: 69 IN | HEART RATE: 52 BPM | WEIGHT: 239 LBS | BODY MASS INDEX: 35.4 KG/M2 | DIASTOLIC BLOOD PRESSURE: 65 MMHG | SYSTOLIC BLOOD PRESSURE: 151 MMHG

## 2018-07-14 LAB — POTASSIUM SERPL-SCNC: 4 MMOL/L (ref 3.3–5.1)

## 2018-07-14 PROCEDURE — S0028 INJECTION, FAMOTIDINE, 20 MG: HCPCS | Performed by: SURGERY

## 2018-07-14 PROCEDURE — 84132 ASSAY OF SERUM POTASSIUM: CPT | Performed by: HOSPITALIST

## 2018-07-14 RX ORDER — TRAMADOL HYDROCHLORIDE 50 MG/1
TABLET ORAL EVERY 6 HOURS PRN
Qty: 30 TABLET | Refills: 0 | Status: SHIPPED | OUTPATIENT
Start: 2018-07-14 | End: 2018-08-06 | Stop reason: DRUGHIGH

## 2018-07-14 RX ORDER — SACCHAROMYCES BOULARDII 250 MG
250 CAPSULE ORAL 2 TIMES DAILY
Qty: 20 CAPSULE | Refills: 1 | Status: SHIPPED | OUTPATIENT
Start: 2018-07-14 | End: 2018-08-13

## 2018-07-14 RX ORDER — CIPROFLOXACIN 500 MG/1
500 TABLET, FILM COATED ORAL 2 TIMES DAILY
Qty: 14 TABLET | Refills: 1 | Status: SHIPPED | OUTPATIENT
Start: 2018-07-14 | End: 2018-08-13

## 2018-07-14 RX ORDER — METRONIDAZOLE 500 MG/1
500 TABLET ORAL 3 TIMES DAILY
Qty: 21 TABLET | Refills: 1 | Status: SHIPPED | OUTPATIENT
Start: 2018-07-14 | End: 2018-08-13

## 2018-07-14 NOTE — PROGRESS NOTES
[facility]    Progress Note    Sharma Cooks Patient Status:  Inpatient    1965 MRN [id number]   Location [facility] 4W/SW/SE Attending Gray Michele MD   [facility] Day # 5 PCP Rashi Shine MD       Subjective:   Susana Thopre 07/13/18   0530  07/14/18   0537   GLU  144*   < >  158*  104*  98   --    BUN  15   < >  7*  9  6*   --    CREATSERUM  1.06   < >  0.72  0.98  1.11   --    GFRAA  >60   < >  >60  >60  >60   --    GFRNAA  >60   < >  >60  >60  58*   --    CA  9.1   < >  8.1

## 2018-07-14 NOTE — DISCHARGE SUMMARY
[facility] Internal Medicine Discharge Summary   Patient ID:  Sigifredo Hernandes  [id number]  32 year old  1/20/1965    Admit date: 7/9/2018    Discharge date and time: 7/14/2018     Attending Physician: Marya Crocker MD     Primary Care Physician: MD ELOY Vallejo f/u as outpt regarding her decision. Pt d/c in stable condition.     Perforated appendicitis s/p laparscopic cecectomy with drainage of abscess.   Path shows low grade mucinous neoplasm  -pain control: PO pain meds now  -meropenem in house, d/c w/ cipro/fla (SAP=[id number])    Result Date: 7/9/2018  PROCEDURE:   CT OF THE ABDOMEN AND PELVIS WITHOUT CONTRAST - RENAL STONE PROTOCOL  COMPARISON: None available. INDICATIONS: Bilateral lower quadrant abdominal pain acute onset.   TECHNIQUE: Multidetector CT images of t The appendix is dilated in caliber, measuring 1.7-2.1 cm in diameter, and exhibiting extensive periappendiceal inflammatory changes. There is extensive periappendiceal fluid. A few borderline enlarged right lower quadrant pericecal lymph nodes are noted. acute inflammation.   · [id number]      Total Time Coordinating Care: Greater than 30 minutes    Patient had opportunity to ask questions and state understand and agree with therapeutic plan as outlined

## 2018-07-14 NOTE — CONSULTS
[facility]    Patient Name:  Lam Cuevas   YOB: 1965   Gender:  Female   Appt Date:  7/14/2018   Provider:  No name on file.    Insurance:  [address]  Referring Provider: Dr Chip Sheldon /70 (BP Location: Right arm)   Pulse 52   Temp 98.2 °F (36.8 °C) (Oral)   Resp 18   Ht 1.753 m (5' 9\")   Wt 108.4 kg (239 lb)   LMP 07/07/2018   SpO2 95%   BMI 35.29 kg/m²      Medications Reviewed:    Current Outpatient Prescriptions:   •  TraMADol • Cancer Paternal Grandmother    • Cancer Paternal Grandfather    • Cancer Father       Reviewed:       Review of Systems:  Patient reports no rapid or irregular heartbeat. She reports no chest pain. She reports no nausea. She reports no vomiting.  She repo Options discussed in detail include observation with imaging versus observation with imaging to include diagnostic laparoscopy in 6 months to 1 year versus consideration for prophylactic HIPEC. Patient is considering her options.   She would let me know of

## 2018-07-14 NOTE — PLAN OF CARE
Problem: Patient Centered Care  Goal: Patient preferences are identified and integrated in the patient's plan of care  Interventions:    - Provide timely, complete, and accurate information to patient/family  - Incorporate patient and family knowledge, laurie as needed  - Evaluate fluid balance   Outcome: Progressing      Comments: Patient pain covered by tylenol IV. YOVANNY drain engaged, output recorded. Merrem given. Voiding WNL. VSS, afebrile. Up independently. Plan is for consult with Dr. Gelacio Moran today.

## 2018-08-06 ENCOUNTER — LAB ENCOUNTER (OUTPATIENT)
Dept: LAB | Facility: HOSPITAL | Age: 53
End: 2018-08-06
Attending: SURGERY
Payer: COMMERCIAL

## 2018-08-06 DIAGNOSIS — Z01.818 PREOPERATIVE TESTING: ICD-10-CM

## 2018-08-06 PROBLEM — D37.3 LOW GRADE MUCINOUS NEOPLASM OF APPENDIX: Status: ACTIVE | Noted: 2018-08-06

## 2018-08-06 PROBLEM — R03.0 ELEVATED BLOOD PRESSURE READING: Status: ACTIVE | Noted: 2018-08-06

## 2018-08-06 PROBLEM — K35.30 ACUTE APPENDICITIS WITH LOCALIZED PERITONITIS: Status: RESOLVED | Noted: 2018-07-09 | Resolved: 2018-08-06

## 2018-08-06 PROBLEM — R73.9 HYPERGLYCEMIA: Status: RESOLVED | Noted: 2018-07-09 | Resolved: 2018-08-06

## 2018-08-06 LAB
ANTIBODY SCREEN: NEGATIVE
RH BLOOD TYPE: POSITIVE

## 2018-08-06 PROCEDURE — 86850 RBC ANTIBODY SCREEN: CPT

## 2018-08-06 PROCEDURE — 86900 BLOOD TYPING SEROLOGIC ABO: CPT

## 2018-08-06 PROCEDURE — 86901 BLOOD TYPING SEROLOGIC RH(D): CPT

## 2018-08-06 PROCEDURE — 36415 COLL VENOUS BLD VENIPUNCTURE: CPT

## 2018-08-06 RX ORDER — IBUPROFEN 400 MG/1
400 TABLET ORAL EVERY 6 HOURS PRN
COMMUNITY
End: 2018-08-06 | Stop reason: DRUGHIGH

## 2018-08-13 PROCEDURE — 88305 TISSUE EXAM BY PATHOLOGIST: CPT | Performed by: SURGERY

## 2018-08-15 NOTE — PAT NURSING NOTE
Allie Sandra from Dr. Byrd Half office called that patient did not need repeat CBC and BMP per Dr. Milton Benz .

## 2018-08-15 NOTE — PAT NURSING NOTE
Marivel Mathews from Dr. Chichi Zhang office will check if patient needs to go for repeat CBC and BMP.

## 2018-08-23 ENCOUNTER — ANESTHESIA (OUTPATIENT)
Dept: SURGERY | Facility: HOSPITAL | Age: 53
DRG: 331 | End: 2018-08-23
Payer: COMMERCIAL

## 2018-08-23 ENCOUNTER — HOSPITAL ENCOUNTER (INPATIENT)
Facility: HOSPITAL | Age: 53
LOS: 2 days | Discharge: HOME | DRG: 331 | End: 2018-08-25
Attending: SURGERY | Admitting: HOSPITALIST
Payer: COMMERCIAL

## 2018-08-23 ENCOUNTER — SURGERY (OUTPATIENT)
Age: 53
End: 2018-08-23

## 2018-08-23 ENCOUNTER — ANESTHESIA EVENT (OUTPATIENT)
Dept: SURGERY | Facility: HOSPITAL | Age: 53
DRG: 331 | End: 2018-08-23
Payer: COMMERCIAL

## 2018-08-23 DIAGNOSIS — Z01.818 PREOPERATIVE TESTING: Primary | ICD-10-CM

## 2018-08-23 PROBLEM — Z08 ENCOUNTER FOR FOLLOW-UP SURVEILLANCE OF APPENDICEAL CANCER: Status: ACTIVE | Noted: 2018-08-23

## 2018-08-23 PROBLEM — Z85.038 ENCOUNTER FOR FOLLOW-UP SURVEILLANCE OF APPENDICEAL CANCER: Status: ACTIVE | Noted: 2018-08-23

## 2018-08-23 LAB
B-HCG UR QL: NEGATIVE
GLUCOSE BLDC GLUCOMTR-MCNC: 143 MG/DL (ref 70–99)

## 2018-08-23 PROCEDURE — S0028 INJECTION, FAMOTIDINE, 20 MG: HCPCS | Performed by: SURGERY

## 2018-08-23 PROCEDURE — 88309 TISSUE EXAM BY PATHOLOGIST: CPT | Performed by: SURGERY

## 2018-08-23 PROCEDURE — A4216 STERILE WATER/SALINE, 10 ML: HCPCS | Performed by: SURGERY

## 2018-08-23 PROCEDURE — 82962 GLUCOSE BLOOD TEST: CPT

## 2018-08-23 PROCEDURE — C9290 INJ, BUPIVACAINE LIPOSOME: HCPCS | Performed by: SURGERY

## 2018-08-23 PROCEDURE — 81025 URINE PREGNANCY TEST: CPT

## 2018-08-23 PROCEDURE — 0DTF4ZZ RESECTION OF RIGHT LARGE INTESTINE, PERCUTANEOUS ENDOSCOPIC APPROACH: ICD-10-PCS | Performed by: SURGERY

## 2018-08-23 PROCEDURE — 0WUF47Z SUPPLEMENT ABDOMINAL WALL WITH AUTOLOGOUS TISSUE SUBSTITUTE, PERCUTANEOUS ENDOSCOPIC APPROACH: ICD-10-PCS | Performed by: SURGERY

## 2018-08-23 PROCEDURE — 3E0T3BZ INTRODUCTION OF ANESTHETIC AGENT INTO PERIPHERAL NERVES AND PLEXI, PERCUTANEOUS APPROACH: ICD-10-PCS | Performed by: SURGERY

## 2018-08-23 PROCEDURE — 8E0W4CZ ROBOTIC ASSISTED PROCEDURE OF TRUNK REGION, PERCUTANEOUS ENDOSCOPIC APPROACH: ICD-10-PCS | Performed by: SURGERY

## 2018-08-23 RX ORDER — ONDANSETRON 2 MG/ML
8 INJECTION INTRAMUSCULAR; INTRAVENOUS EVERY 6 HOURS PRN
Status: DISCONTINUED | OUTPATIENT
Start: 2018-08-23 | End: 2018-08-25

## 2018-08-23 RX ORDER — ONDANSETRON 2 MG/ML
INJECTION INTRAMUSCULAR; INTRAVENOUS AS NEEDED
Status: DISCONTINUED | OUTPATIENT
Start: 2018-08-23 | End: 2018-08-23 | Stop reason: SURG

## 2018-08-23 RX ORDER — MAGNESIUM OXIDE 400 MG (241.3 MG MAGNESIUM) TABLET
400 TABLET DAILY
Status: DISCONTINUED | OUTPATIENT
Start: 2018-08-23 | End: 2018-08-25

## 2018-08-23 RX ORDER — HEPARIN SODIUM 5000 [USP'U]/ML
5000 INJECTION, SOLUTION INTRAVENOUS; SUBCUTANEOUS EVERY 12 HOURS SCHEDULED
Status: DISCONTINUED | OUTPATIENT
Start: 2018-08-23 | End: 2018-08-25

## 2018-08-23 RX ORDER — ROCURONIUM BROMIDE 10 MG/ML
INJECTION, SOLUTION INTRAVENOUS AS NEEDED
Status: DISCONTINUED | OUTPATIENT
Start: 2018-08-23 | End: 2018-08-23 | Stop reason: SURG

## 2018-08-23 RX ORDER — MORPHINE SULFATE 4 MG/ML
4 INJECTION, SOLUTION INTRAMUSCULAR; INTRAVENOUS EVERY 10 MIN PRN
Status: DISCONTINUED | OUTPATIENT
Start: 2018-08-23 | End: 2018-08-23 | Stop reason: HOSPADM

## 2018-08-23 RX ORDER — NALOXONE HYDROCHLORIDE 0.4 MG/ML
80 INJECTION, SOLUTION INTRAMUSCULAR; INTRAVENOUS; SUBCUTANEOUS AS NEEDED
Status: DISCONTINUED | OUTPATIENT
Start: 2018-08-23 | End: 2018-08-23 | Stop reason: HOSPADM

## 2018-08-23 RX ORDER — BUPIVACAINE HYDROCHLORIDE AND EPINEPHRINE 2.5; 5 MG/ML; UG/ML
INJECTION, SOLUTION INFILTRATION; PERINEURAL AS NEEDED
Status: DISCONTINUED | OUTPATIENT
Start: 2018-08-23 | End: 2018-08-23 | Stop reason: HOSPADM

## 2018-08-23 RX ORDER — SODIUM CHLORIDE, SODIUM LACTATE, POTASSIUM CHLORIDE, CALCIUM CHLORIDE 600; 310; 30; 20 MG/100ML; MG/100ML; MG/100ML; MG/100ML
INJECTION, SOLUTION INTRAVENOUS CONTINUOUS
Status: DISCONTINUED | OUTPATIENT
Start: 2018-08-23 | End: 2018-08-23 | Stop reason: HOSPADM

## 2018-08-23 RX ORDER — GABAPENTIN 300 MG/1
300 CAPSULE ORAL NIGHTLY
Status: DISCONTINUED | OUTPATIENT
Start: 2018-08-23 | End: 2018-08-25

## 2018-08-23 RX ORDER — MORPHINE SULFATE 4 MG/ML
4 INJECTION, SOLUTION INTRAMUSCULAR; INTRAVENOUS EVERY 2 HOUR PRN
Status: DISCONTINUED | OUTPATIENT
Start: 2018-08-23 | End: 2018-08-25

## 2018-08-23 RX ORDER — GABAPENTIN 300 MG/1
300 CAPSULE ORAL ONCE
Status: COMPLETED | OUTPATIENT
Start: 2018-08-23 | End: 2018-08-23

## 2018-08-23 RX ORDER — HEPARIN SODIUM 5000 [USP'U]/ML
5000 INJECTION, SOLUTION INTRAVENOUS; SUBCUTANEOUS ONCE
Status: COMPLETED | OUTPATIENT
Start: 2018-08-23 | End: 2018-08-23

## 2018-08-23 RX ORDER — MORPHINE SULFATE 4 MG/ML
6 INJECTION, SOLUTION INTRAMUSCULAR; INTRAVENOUS EVERY 2 HOUR PRN
Status: DISCONTINUED | OUTPATIENT
Start: 2018-08-23 | End: 2018-08-25

## 2018-08-23 RX ORDER — FAMOTIDINE 10 MG/ML
20 INJECTION, SOLUTION INTRAVENOUS 2 TIMES DAILY
Status: DISCONTINUED | OUTPATIENT
Start: 2018-08-23 | End: 2018-08-25

## 2018-08-23 RX ORDER — DOCUSATE SODIUM 100 MG/1
100 CAPSULE, LIQUID FILLED ORAL 2 TIMES DAILY
Status: DISCONTINUED | OUTPATIENT
Start: 2018-08-23 | End: 2018-08-25

## 2018-08-23 RX ORDER — SODIUM CHLORIDE 0.9 % (FLUSH) 0.9 %
10 SYRINGE (ML) INJECTION AS NEEDED
Status: DISCONTINUED | OUTPATIENT
Start: 2018-08-23 | End: 2018-08-23 | Stop reason: HOSPADM

## 2018-08-23 RX ORDER — MORPHINE SULFATE 10 MG/ML
6 INJECTION, SOLUTION INTRAMUSCULAR; INTRAVENOUS EVERY 10 MIN PRN
Status: DISCONTINUED | OUTPATIENT
Start: 2018-08-23 | End: 2018-08-23 | Stop reason: HOSPADM

## 2018-08-23 RX ORDER — ALVIMOPAN 12 MG/1
12 CAPSULE ORAL ONCE
Status: COMPLETED | OUTPATIENT
Start: 2018-08-23 | End: 2018-08-23

## 2018-08-23 RX ORDER — SODIUM CHLORIDE 0.9 % (FLUSH) 0.9 %
10 SYRINGE (ML) INJECTION AS NEEDED
Status: DISCONTINUED | OUTPATIENT
Start: 2018-08-23 | End: 2018-08-25

## 2018-08-23 RX ORDER — ALVIMOPAN 12 MG/1
12 CAPSULE ORAL 2 TIMES DAILY
Status: DISCONTINUED | OUTPATIENT
Start: 2018-08-24 | End: 2018-08-25

## 2018-08-23 RX ORDER — ONDANSETRON 2 MG/ML
4 INJECTION INTRAMUSCULAR; INTRAVENOUS ONCE AS NEEDED
Status: DISCONTINUED | OUTPATIENT
Start: 2018-08-23 | End: 2018-08-23 | Stop reason: HOSPADM

## 2018-08-23 RX ORDER — SODIUM CHLORIDE, SODIUM LACTATE, POTASSIUM CHLORIDE, CALCIUM CHLORIDE 600; 310; 30; 20 MG/100ML; MG/100ML; MG/100ML; MG/100ML
INJECTION, SOLUTION INTRAVENOUS CONTINUOUS
Status: DISCONTINUED | OUTPATIENT
Start: 2018-08-23 | End: 2018-08-25

## 2018-08-23 RX ORDER — SODIUM CHLORIDE 9 MG/ML
INJECTION, SOLUTION INTRAVENOUS CONTINUOUS PRN
Status: DISCONTINUED | OUTPATIENT
Start: 2018-08-23 | End: 2018-08-23 | Stop reason: SURG

## 2018-08-23 RX ORDER — GLYCOPYRROLATE 0.2 MG/ML
INJECTION INTRAMUSCULAR; INTRAVENOUS AS NEEDED
Status: DISCONTINUED | OUTPATIENT
Start: 2018-08-23 | End: 2018-08-23 | Stop reason: SURG

## 2018-08-23 RX ORDER — ACETAMINOPHEN 10 MG/ML
1000 INJECTION, SOLUTION INTRAVENOUS ONCE
Status: COMPLETED | OUTPATIENT
Start: 2018-08-23 | End: 2018-08-23

## 2018-08-23 RX ORDER — DEXAMETHASONE SODIUM PHOSPHATE 4 MG/ML
VIAL (ML) INJECTION AS NEEDED
Status: DISCONTINUED | OUTPATIENT
Start: 2018-08-23 | End: 2018-08-23 | Stop reason: SURG

## 2018-08-23 RX ORDER — MIDAZOLAM HYDROCHLORIDE 1 MG/ML
INJECTION INTRAMUSCULAR; INTRAVENOUS AS NEEDED
Status: DISCONTINUED | OUTPATIENT
Start: 2018-08-23 | End: 2018-08-23 | Stop reason: SURG

## 2018-08-23 RX ORDER — MORPHINE SULFATE 2 MG/ML
2 INJECTION, SOLUTION INTRAMUSCULAR; INTRAVENOUS EVERY 2 HOUR PRN
Status: DISCONTINUED | OUTPATIENT
Start: 2018-08-23 | End: 2018-08-25

## 2018-08-23 RX ORDER — ACETAMINOPHEN 500 MG
1000 TABLET ORAL EVERY 8 HOURS
Status: DISCONTINUED | OUTPATIENT
Start: 2018-08-23 | End: 2018-08-25

## 2018-08-23 RX ORDER — SODIUM CHLORIDE, SODIUM LACTATE, POTASSIUM CHLORIDE, CALCIUM CHLORIDE 600; 310; 30; 20 MG/100ML; MG/100ML; MG/100ML; MG/100ML
2 INJECTION, SOLUTION INTRAVENOUS CONTINUOUS
Status: DISCONTINUED | OUTPATIENT
Start: 2018-08-23 | End: 2018-08-25

## 2018-08-23 RX ORDER — LIDOCAINE HYDROCHLORIDE 10 MG/ML
INJECTION, SOLUTION EPIDURAL; INFILTRATION; INTRACAUDAL; PERINEURAL AS NEEDED
Status: DISCONTINUED | OUTPATIENT
Start: 2018-08-23 | End: 2018-08-23 | Stop reason: SURG

## 2018-08-23 RX ORDER — MORPHINE SULFATE 4 MG/ML
2 INJECTION, SOLUTION INTRAMUSCULAR; INTRAVENOUS EVERY 10 MIN PRN
Status: DISCONTINUED | OUTPATIENT
Start: 2018-08-23 | End: 2018-08-23 | Stop reason: HOSPADM

## 2018-08-23 RX ADMIN — ROCURONIUM BROMIDE 20 MG: 10 INJECTION, SOLUTION INTRAVENOUS at 12:55:00

## 2018-08-23 RX ADMIN — MIDAZOLAM HYDROCHLORIDE 2 MG: 1 INJECTION INTRAMUSCULAR; INTRAVENOUS at 10:08:00

## 2018-08-23 RX ADMIN — SODIUM CHLORIDE, SODIUM LACTATE, POTASSIUM CHLORIDE, CALCIUM CHLORIDE: 600; 310; 30; 20 INJECTION, SOLUTION INTRAVENOUS at 14:29:00

## 2018-08-23 RX ADMIN — ROCURONIUM BROMIDE 30 MG: 10 INJECTION, SOLUTION INTRAVENOUS at 10:49:00

## 2018-08-23 RX ADMIN — SODIUM CHLORIDE: 9 INJECTION, SOLUTION INTRAVENOUS at 10:15:00

## 2018-08-23 RX ADMIN — ROCURONIUM BROMIDE 20 MG: 10 INJECTION, SOLUTION INTRAVENOUS at 11:55:00

## 2018-08-23 RX ADMIN — GLYCOPYRROLATE 0.2 MG: 0.2 INJECTION INTRAMUSCULAR; INTRAVENOUS at 10:09:00

## 2018-08-23 RX ADMIN — SODIUM CHLORIDE, SODIUM LACTATE, POTASSIUM CHLORIDE, CALCIUM CHLORIDE: 600; 310; 30; 20 INJECTION, SOLUTION INTRAVENOUS at 10:07:00

## 2018-08-23 RX ADMIN — DEXAMETHASONE SODIUM PHOSPHATE 4 MG: 4 MG/ML VIAL (ML) INJECTION at 10:08:00

## 2018-08-23 RX ADMIN — SODIUM CHLORIDE: 9 INJECTION, SOLUTION INTRAVENOUS at 12:30:00

## 2018-08-23 RX ADMIN — SODIUM CHLORIDE: 9 INJECTION, SOLUTION INTRAVENOUS at 14:29:00

## 2018-08-23 RX ADMIN — ROCURONIUM BROMIDE 50 MG: 10 INJECTION, SOLUTION INTRAVENOUS at 10:10:00

## 2018-08-23 RX ADMIN — LIDOCAINE HYDROCHLORIDE 100 MG: 10 INJECTION, SOLUTION EPIDURAL; INFILTRATION; INTRACAUDAL; PERINEURAL at 10:10:00

## 2018-08-23 RX ADMIN — ACETAMINOPHEN 1000 MG: 10 INJECTION, SOLUTION INTRAVENOUS at 13:50:00

## 2018-08-23 RX ADMIN — ONDANSETRON 4 MG: 2 INJECTION INTRAMUSCULAR; INTRAVENOUS at 10:08:00

## 2018-08-23 NOTE — ANESTHESIA PREPROCEDURE EVALUATION
Anesthesia PreOp Note    HPI:     Geoff Mosley is a 48year old female who presents for preoperative consultation requested by: Gerardo Barrera MD    Date of Surgery: 8/23/2018    Procedure(s):  ROBOT-ASSISTED LAPAROSCOPIC RIGHT COLON RESECTION  [facility]      Prescriptions Prior to Admission:  NON FORMULARY  Disp:  Rfl:  8/7/2018   hydrochlorothiazide 12.5 MG Oral Tab Take 1 tablet (12.5 mg total) by mouth daily.  Disp: 30 tablet Rfl: 6 8/22/2018 at 0900   PEG 3350-KCl-Na Bicarb-NaCl (NULYTELY WITH FLAVO Results  Component Value Date   WBC 4.8 07/13/2018   RBC 4.27 07/13/2018   HGB 13.1 07/13/2018   HCT 38.2 07/13/2018   MCV 89.5 07/13/2018   MCH 30.6 07/13/2018   MCHC 34.2 07/13/2018   RDW 13.2 07/13/2018    07/13/2018   MPV 9.9 07/13/2018   URINEP patient desires the anesthetic management as planned.   Adin CROSS  8/23/2018 9:25 AM

## 2018-08-23 NOTE — H&P (VIEW-ONLY)
Deion Ibrahim is a 48year old female. No chief complaint on file.     HPI:    Patient is here for a postop visit. Patient underwent laparoscopic appendectomy for perforated appendicitis on 7/10/2018.   Theology does reveal mucinous neoplasm at the appe Specimen Information: Appendix;  Body fluid, unspecified         Culture      1+ growth Clostridium perfringens         Resulting Agency: [facility]       Specimen Collected: 07/10/18  9:47 AM Last Resulted: 07/13/18 10:45 AM                        Allerg Alcohol use: Yes           1.8 oz/week     Standard drinks or equivalent: 3 per week         ROS:   General/Constitutional:   Patient denies fever, night sweats. Denies Sleep disturbance. Respiratory:   Denies Chest pain. Denies Pain with inspiration.  Stacy Cordoba I had extensive discussion with the patient as well as her  as etiology of the above. Discuss it for further resection due to positive margin. Since the patient prior sacrectomy patient will require formal right hemicolectomy.   The patient has a f I had lengthy discussion with the patient is etiology of colon cancers and colon polyps. The patient needs a colonoscopy due to anesthesia colon cancer and personal history of mucinous neoplasm of the cecum and appendix. .  Discussed the importance to under infarction, respiratory failure, renal failure, pulmonary embolus, deep vein thrombosis, CVA, and even death were all discussed in detail with the patient who understands, consents, and wishes to proceed with the operation.   I reviewed patient education ma

## 2018-08-23 NOTE — H&P
[facility] Hospitalist Team  History and Physical     ASSESSMENT / PLAN:     47 y/o F w/ PMH HL, prior symptomatic AV block with extensive negative cardiac work up starting 2008 thought to be due to neurocardiogenic presyncope/syncope on no home meds, recently pr conjunctiva normal  NECK: supple; no JVD;  RESPIRATORY: normal expansion; non labored; CTA   CARDIOVASCULAR: regular,nl S1 S2; no murmur, no gallop; no rub   ABDOMEN:  Soft, incision c/d/i.    EXTREMITIES: no edema; no cyanosis;, no calf tenderness; periphe tablet Rfl: 6   PEG 3350-KCl-Na Bicarb-NaCl (NULYTELY WITH FLAVOR PACKS) 420 g Oral Recon Soln Use as directed Disp: 1 Bottle Rfl: 0   [DISCONTINUED] TraMADol HCl 50 MG Oral Tab Take 1-2 tablets ( mg total) by mouth every 6 (six) hours as needed for

## 2018-08-23 NOTE — INTERVAL H&P NOTE
Pre-op Diagnosis: Colon cancer    The above referenced H&P was reviewed by Roque Niño MD on 8/23/2018, the patient was examined and no significant changes have occurred in the patient's condition since the H&P was performed.   I discussed with the p

## 2018-08-23 NOTE — CONSULTS
Tomas Britton is a 48year old female. No chief complaint on file.     HPI:    Patient is here for a postop visit. Patient underwent laparoscopic appendectomy for perforated appendicitis on 7/10/2018.   Theology does reveal mucinous neoplasm at the appe Specimen Information: Appendix;  Body fluid, unspecified         Culture      1+ growth Clostridium perfringens         Resulting Agency: [facility]       Specimen Collected: 07/10/18  9:47 AM Last Resulted: 07/13/18 10:45 AM                        Allerg Alcohol use: Yes           1.8 oz/week     Standard drinks or equivalent: 3 per week         ROS:   General/Constitutional:   Patient denies fever, night sweats. Denies Sleep disturbance. Respiratory:   Denies Chest pain. Denies Pain with inspiration.  Nabila Garg I had extensive discussion with the patient as well as her  as etiology of the above. Discuss it for further resection due to positive margin. Since the patient prior sacrectomy patient will require formal right hemicolectomy.   The patient has a f I had lengthy discussion with the patient is etiology of colon cancers and colon polyps. The patient needs a colonoscopy due to anesthesia colon cancer and personal history of mucinous neoplasm of the cecum and appendix. .  Discussed the importance to under infarction, respiratory failure, renal failure, pulmonary embolus, deep vein thrombosis, CVA, and even death were all discussed in detail with the patient who understands, consents, and wishes to proceed with the operation.   I reviewed patient education ma

## 2018-08-23 NOTE — ANESTHESIA POSTPROCEDURE EVALUATION
Patient: Sebas Hennessy    Procedure Summary     Date:  08/23/18 Room / Location:  [address] [place] [address] / [address] [place] [address] MAIN OR    Anesthesia Start:  0673 Anesthesia Stop:  6046    Procedure:  ROBOT-ASSISTED LAPAROSCOPIC RIGHT COLON RESECTION (Right ) Diagnosis:  (mucinou

## 2018-08-23 NOTE — ANESTHESIA PROCEDURE NOTES
Airway  Urgency: elective    Airway not difficult    General Information and Staff    Patient location during procedure: OR  Anesthesiologist: Suzanne Moura  Performed: anesthesiologist     Indications and Patient Condition  Indications for airway manag

## 2018-08-23 NOTE — BRIEF OP NOTE
Pre-Operative Diagnosis: mucinous neoplasm of appendix  Preoperative clinical stage I     Post-Operative Diagnosis: mucinous neoplasm of appendix    Postoperative clinical stage I  Procedure Performed:   Procedure(s):  Robotic assisted right hemicolectomy,

## 2018-08-24 PROBLEM — Z01.818 PREOPERATIVE TESTING: Status: ACTIVE | Noted: 2018-08-23

## 2018-08-24 LAB
ANION GAP SERPL CALC-SCNC: 7 MMOL/L (ref 0–18)
BASOPHILS # BLD: 0 K/UL (ref 0–0.2)
BASOPHILS NFR BLD: 0 %
BUN SERPL-MCNC: 5 MG/DL (ref 8–20)
BUN/CREAT SERPL: 6.1 (ref 10–20)
CALCIUM SERPL-MCNC: 9 MG/DL (ref 8.5–10.5)
CHLORIDE SERPL-SCNC: 105 MMOL/L (ref 95–110)
CO2 SERPL-SCNC: 26 MMOL/L (ref 22–32)
CREAT SERPL-MCNC: 0.82 MG/DL (ref 0.5–1.5)
EOSINOPHIL # BLD: 0 K/UL (ref 0–0.7)
EOSINOPHIL NFR BLD: 0 %
ERYTHROCYTE [DISTWIDTH] IN BLOOD BY AUTOMATED COUNT: 13 % (ref 11–15)
GLUCOSE BLDC GLUCOMTR-MCNC: 126 MG/DL (ref 70–99)
GLUCOSE BLDC GLUCOMTR-MCNC: 138 MG/DL (ref 70–99)
GLUCOSE BLDC GLUCOMTR-MCNC: 148 MG/DL (ref 70–99)
GLUCOSE BLDC GLUCOMTR-MCNC: 174 MG/DL (ref 70–99)
GLUCOSE SERPL-MCNC: 155 MG/DL (ref 70–99)
HCT VFR BLD AUTO: 43 % (ref 35–48)
HGB BLD-MCNC: 14.6 G/DL (ref 12–16)
LYMPHOCYTES # BLD: 0.8 K/UL (ref 1–4)
LYMPHOCYTES NFR BLD: 7 %
MAGNESIUM SERPL-MCNC: 1.9 MG/DL (ref 1.8–2.5)
MCH RBC QN AUTO: 30.1 PG (ref 27–32)
MCHC RBC AUTO-ENTMCNC: 34 G/DL (ref 32–37)
MCV RBC AUTO: 88.4 FL (ref 80–100)
MONOCYTES # BLD: 0.5 K/UL (ref 0–1)
MONOCYTES NFR BLD: 5 %
NEUTROPHILS # BLD AUTO: 10.2 K/UL (ref 1.8–7.7)
NEUTROPHILS NFR BLD: 89 %
OSMOLALITY UR CALC.SUM OF ELEC: 286 MOSM/KG (ref 275–295)
PHOSPHATE SERPL-MCNC: 4 MG/DL (ref 2.4–4.7)
PLATELET # BLD AUTO: 203 K/UL (ref 140–400)
PMV BLD AUTO: 10.4 FL (ref 7.4–10.3)
POTASSIUM SERPL-SCNC: 4.3 MMOL/L (ref 3.3–5.1)
RBC # BLD AUTO: 4.86 M/UL (ref 3.7–5.4)
SODIUM SERPL-SCNC: 138 MMOL/L (ref 136–144)
WBC # BLD AUTO: 11.5 K/UL (ref 4–11)

## 2018-08-24 PROCEDURE — S0028 INJECTION, FAMOTIDINE, 20 MG: HCPCS | Performed by: SURGERY

## 2018-08-24 PROCEDURE — 84100 ASSAY OF PHOSPHORUS: CPT | Performed by: SURGERY

## 2018-08-24 PROCEDURE — 80048 BASIC METABOLIC PNL TOTAL CA: CPT | Performed by: SURGERY

## 2018-08-24 PROCEDURE — 82962 GLUCOSE BLOOD TEST: CPT

## 2018-08-24 PROCEDURE — 83036 HEMOGLOBIN GLYCOSYLATED A1C: CPT | Performed by: HOSPITALIST

## 2018-08-24 PROCEDURE — 83735 ASSAY OF MAGNESIUM: CPT | Performed by: SURGERY

## 2018-08-24 PROCEDURE — A4216 STERILE WATER/SALINE, 10 ML: HCPCS | Performed by: SURGERY

## 2018-08-24 PROCEDURE — 85025 COMPLETE CBC W/AUTO DIFF WBC: CPT | Performed by: SURGERY

## 2018-08-24 PROCEDURE — 97162 PT EVAL MOD COMPLEX 30 MIN: CPT

## 2018-08-24 RX ORDER — TRAMADOL HYDROCHLORIDE 50 MG/1
50 TABLET ORAL EVERY 6 HOURS PRN
Status: DISCONTINUED | OUTPATIENT
Start: 2018-08-24 | End: 2018-08-25

## 2018-08-24 NOTE — PROGRESS NOTES
[facility] Hospitalist Team  Progress Note    Bambi Bronson Patient Status:  Inpatient    1965 MRN [id number]   Location [facility] 4W/SW/SE Attending Delores Springer MD   Hosp Day # 1 PCP Ela Zabala MD     CC: Follow Up  PCP: MD DAQUAN Colon No results for input(s): ALT, AST, ALB, AMYLASE, LIPASE, LDH in the last 168 hours.     Invalid input(s): ALPHOS, TBIL, DBIL, TPROT    Recent Labs   Lab  08/23/18   1746  08/24/18   0007  08/24/18   0615  08/24/18   1241   PGLU  143*  174*  138*  148*

## 2018-08-24 NOTE — PROGRESS NOTES
[facility]    Progress Note    Deion Ibrahim Patient Status:  Inpatient    1965 MRN [id number]   Location [facility] 4W/SW/SE Attending Cierra Torres MD   Hosp Day # 1 PCP Alissa Phillip MD       Subjective:   Deioneva Ibrahim is a min      Enxertos 30 8/24/2018  8:52 AM

## 2018-08-24 NOTE — OPERATIVE REPORT
[facility]    PATIENT'S NAME: Sabra Rai   ATTENDING PHYSICIAN: Krystal Coleman MD   OPERATING PHYSICIAN: Steven Guo MD   PATIENT ACCOUNT#:   [id number]    LOCATION:  [facility] PACU 6 [facility] 10  MEDICAL RECORD #:   [id number]       KARLI both of whom understand, consent, and wish to proceed with the operation. PROCEDURE:  Patient was brought to the operating room and placed on the operating table in supine position. General anesthetic was administered via endotracheal tube anesthesia. from the hepatic flexure down to the region of the cecum. This was easily mobilized without any difficulty. Terminal ileum was completely mobilized. The right ureter was identified and preserved. The colon was easily mobilized for resection.   The mesen widely patent. At this time, an omental flap was made and the omentum was tacked down overlying the anastomosis of the small bowel and the colon with 2-0 silk seromuscular Lembert sutures. There were no other abnormalities present.   Abdominal cavity was

## 2018-08-24 NOTE — PHYSICAL THERAPY NOTE
PHYSICAL THERAPY EVALUATION - INPATIENT     Room Number: 450/450-A  Evaluation Date: 8/24/2018  Type of Evaluation: Initial   Physician Order: PT Eval and Treat    Presenting Problem: mucunious neoplasm of appendix   pt is s/p robotic assisted right hemic needs within reach report to RN . Encouraged pt to continue to ambulate  with staff and family support today for improved tolerance . Pt with goal for d/c to home possibly tomorrow. Pt does live with spouse and will have assist at d/c .  Pt with one s Hx of irregular heartbeat, cardiac workup completed 2016, negative per cardiologist Dr. Bebe Lujan, no treatment   • Back problem     Neck pain, into left hand with numbness and tingling   [facility]    COLON   • DEPRESSION    • HERPES SIMPL techniques;Relaxation;Repositioning    COGNITION  WFL     RANGE OF MOTION AND STRENGTH ASSESSMENT  Upper extremity ROM  are within functional limits    Lower extremity ROM is grossly within functional limit    Lower extremity strength is within functional exercise: Ankle pumps  Transfer training    Patient End of Session: Up in chair;Needs met;Call light within reach;RN aware of session/findings; All patient questions and concerns addressed; Family present    CURRENT GOALS    Goals to be met by:8/301/8   Beverly Horton

## 2018-08-24 NOTE — PLAN OF CARE
Minimal or absence of nausea and vomiting Progressing      Absence of urinary retention Progressing      Verbalizes/displays adequate comfort level or patient's stated pain goal Progressing      Patient preferences are identified and integrated in the ricardo

## 2018-08-25 VITALS
HEIGHT: 69 IN | OXYGEN SATURATION: 96 % | SYSTOLIC BLOOD PRESSURE: 136 MMHG | BODY MASS INDEX: 35.4 KG/M2 | TEMPERATURE: 98 F | DIASTOLIC BLOOD PRESSURE: 68 MMHG | RESPIRATION RATE: 20 BRPM | WEIGHT: 239 LBS | HEART RATE: 58 BPM

## 2018-08-25 LAB
ANION GAP SERPL CALC-SCNC: 6 MMOL/L (ref 0–18)
BASOPHILS # BLD: 0 K/UL (ref 0–0.2)
BASOPHILS NFR BLD: 0 %
BUN SERPL-MCNC: 12 MG/DL (ref 8–20)
BUN/CREAT SERPL: 12.9 (ref 10–20)
CALCIUM SERPL-MCNC: 8.5 MG/DL (ref 8.5–10.5)
CHLORIDE SERPL-SCNC: 102 MMOL/L (ref 95–110)
CO2 SERPL-SCNC: 30 MMOL/L (ref 22–32)
CREAT SERPL-MCNC: 0.93 MG/DL (ref 0.5–1.5)
EOSINOPHIL # BLD: 0.1 K/UL (ref 0–0.7)
EOSINOPHIL NFR BLD: 1 %
ERYTHROCYTE [DISTWIDTH] IN BLOOD BY AUTOMATED COUNT: 13.2 % (ref 11–15)
GLUCOSE SERPL-MCNC: 98 MG/DL (ref 70–99)
HBA1C MFR BLD: 5 % (ref 4–6)
HCT VFR BLD AUTO: 39.4 % (ref 35–48)
HGB BLD-MCNC: 13.6 G/DL (ref 12–16)
LYMPHOCYTES # BLD: 2.2 K/UL (ref 1–4)
LYMPHOCYTES NFR BLD: 26 %
MAGNESIUM SERPL-MCNC: 2.1 MG/DL (ref 1.8–2.5)
MCH RBC QN AUTO: 30.5 PG (ref 27–32)
MCHC RBC AUTO-ENTMCNC: 34.5 G/DL (ref 32–37)
MCV RBC AUTO: 88.4 FL (ref 80–100)
MONOCYTES # BLD: 0.6 K/UL (ref 0–1)
MONOCYTES NFR BLD: 7 %
NEUTROPHILS # BLD AUTO: 5.4 K/UL (ref 1.8–7.7)
NEUTROPHILS NFR BLD: 66 %
OSMOLALITY UR CALC.SUM OF ELEC: 286 MOSM/KG (ref 275–295)
PHOSPHATE SERPL-MCNC: 3.7 MG/DL (ref 2.4–4.7)
PLATELET # BLD AUTO: 164 K/UL (ref 140–400)
PMV BLD AUTO: 9.5 FL (ref 7.4–10.3)
POTASSIUM SERPL-SCNC: 4.1 MMOL/L (ref 3.3–5.1)
RBC # BLD AUTO: 4.45 M/UL (ref 3.7–5.4)
SODIUM SERPL-SCNC: 138 MMOL/L (ref 136–144)
WBC # BLD AUTO: 8.2 K/UL (ref 4–11)

## 2018-08-25 PROCEDURE — 80048 BASIC METABOLIC PNL TOTAL CA: CPT | Performed by: SURGERY

## 2018-08-25 PROCEDURE — 83735 ASSAY OF MAGNESIUM: CPT | Performed by: SURGERY

## 2018-08-25 PROCEDURE — 84100 ASSAY OF PHOSPHORUS: CPT | Performed by: SURGERY

## 2018-08-25 PROCEDURE — 85025 COMPLETE CBC W/AUTO DIFF WBC: CPT | Performed by: SURGERY

## 2018-08-25 RX ORDER — FAMOTIDINE 20 MG/1
20 TABLET ORAL 2 TIMES DAILY
Status: DISCONTINUED | OUTPATIENT
Start: 2018-08-25 | End: 2018-08-25

## 2018-08-25 RX ORDER — PSEUDOEPHEDRINE HCL 30 MG
100 TABLET ORAL 2 TIMES DAILY
Qty: 30 CAPSULE | Refills: 0 | Status: SHIPPED | OUTPATIENT
Start: 2018-08-25 | End: 2018-09-26 | Stop reason: ALTCHOICE

## 2018-08-25 RX ORDER — TRAMADOL HYDROCHLORIDE 50 MG/1
50 TABLET ORAL EVERY 8 HOURS PRN
Qty: 20 TABLET | Refills: 0 | Status: SHIPPED | OUTPATIENT
Start: 2018-08-25 | End: 2018-09-26 | Stop reason: ALTCHOICE

## 2018-08-25 RX ORDER — TRAMADOL HYDROCHLORIDE 50 MG/1
TABLET ORAL EVERY 6 HOURS PRN
Qty: 20 TABLET | Refills: 0 | Status: SHIPPED | OUTPATIENT
Start: 2018-08-25 | End: 2018-09-26 | Stop reason: ALTCHOICE

## 2018-08-25 NOTE — DISCHARGE SUMMARY
General Medicine Discharge Summary     Patient ID:  Shantelle Ocasio  48year old  1/20/1965    Admit date: 8/23/2018    Discharge date and time: 8/25/18    Attending Physician: Pasha Brady MD     Consults: IP CONSULT TO RESPIRATORY CARE  IP CONSULT TO FOOD A and PCP in 1 -2 weeks.       appendiceal mucinous neoplasm  - POD #1 s/p robotic assisted right hemicolectomy, robotic omental flap with omentopexy  - pain controlled tramadol given  - fu with surgery regarding pathology     Hyperglycemia  - no known hx of [facility] [address]  Nataly Gongora MD. Schedule an appointment as soon as possible for a visit in 5 days.     Specialty:  SURGERY, GENERAL  Why:  Call for Follow-up postoperatively  Contact information:  Eloina Aguayo decisions for at least the first 24 hours. · Don’t drive while you are still taking opioid pain medication, and don’t drive u.ntil you are able to step firmly on the brake pedal without hesitation.   · Ask others to help with chores and errands while you r

## 2018-08-25 NOTE — PROGRESS NOTES
[facility]    Progress Note    Katharina Herbert Patient Status:  Inpatient    1965 MRN [id number]   Location [facility] 4W/SW/SE Attending Peng Turner MD   Hosp Day # 2 PCP Jeanette Stone MD       Subjective:   Katharina Herbert is a [employer]     8/25/2018  11:55 AM

## 2018-10-01 ENCOUNTER — APPOINTMENT (OUTPATIENT)
Dept: HEMATOLOGY/ONCOLOGY | Facility: HOSPITAL | Age: 53
End: 2018-10-01
Attending: INTERNAL MEDICINE
Payer: COMMERCIAL

## 2018-10-10 ENCOUNTER — OFFICE VISIT (OUTPATIENT)
Dept: HEMATOLOGY/ONCOLOGY | Facility: HOSPITAL | Age: 53
End: 2018-10-10
Attending: INTERNAL MEDICINE
Payer: COMMERCIAL

## 2018-10-10 VITALS
TEMPERATURE: 99 F | DIASTOLIC BLOOD PRESSURE: 67 MMHG | HEART RATE: 64 BPM | RESPIRATION RATE: 16 BRPM | BODY MASS INDEX: 35.55 KG/M2 | SYSTOLIC BLOOD PRESSURE: 137 MMHG | HEIGHT: 69 IN | WEIGHT: 240 LBS

## 2018-10-10 DIAGNOSIS — D37.3 LOW GRADE MUCINOUS NEOPLASM OF APPENDIX: Primary | ICD-10-CM

## 2018-10-10 PROCEDURE — 99205 OFFICE O/P NEW HI 60 MIN: CPT | Performed by: INTERNAL MEDICINE

## 2018-10-11 NOTE — CONSULTS
[facility]    PATIENT'S NAME: Tamez Jesús   CONSULTING PHYSICIAN: Chelsey Moya MD   PATIENT ACCOUNT #: [de-identified] LOCATION: [address] [place] [address] RECORD #: [id number] YOB: 1965   CONSULTATION DATE: 10/10/2018       CANCER C of the hospital in less than 72 hours, and her final pathology from the cecal resection showed 1 focus of dissecting mucin that was 1 cm in size in the attached mesentery. There were 14 lymph nodes that were unremarkable.   There was no evidence of residua of her paternal grandmother's siblings had colon cancer. The patient is the oldest of 3. She has 3 brothers who are in good health. Her children are 25 and 21, in good health. REVIEW OF SYSTEMS:  No headaches.   She has had astigmatism and LASIK surge This can happen anywhere from now to 20 years later, and there is no absolute known prevention for this other than doing prophylactic HIPEC.   This is still slightly controversial as there is no randomized trial showing that this is more efficacious that do

## 2019-01-17 ENCOUNTER — OFFICE VISIT (OUTPATIENT)
Dept: INTERNAL MEDICINE CLINIC | Facility: CLINIC | Age: 54
End: 2019-01-17

## 2019-01-17 VITALS
HEIGHT: 69 IN | OXYGEN SATURATION: 99 % | SYSTOLIC BLOOD PRESSURE: 124 MMHG | HEART RATE: 63 BPM | DIASTOLIC BLOOD PRESSURE: 66 MMHG | RESPIRATION RATE: 19 BRPM | TEMPERATURE: 99 F | BODY MASS INDEX: 35.25 KG/M2 | WEIGHT: 238 LBS

## 2019-01-17 DIAGNOSIS — D37.3 LOW GRADE MUCINOUS NEOPLASM OF APPENDIX: Primary | ICD-10-CM

## 2019-01-17 DIAGNOSIS — D17.9 MULTIPLE LIPOMAS: ICD-10-CM

## 2019-01-17 DIAGNOSIS — Z79.899 ENCOUNTER FOR MONITORING DIURETIC THERAPY: ICD-10-CM

## 2019-01-17 DIAGNOSIS — Z80.0 FAMILY HISTORY OF COLON CANCER: ICD-10-CM

## 2019-01-17 DIAGNOSIS — D31.90 NEVUS OF EYE, UNSPECIFIED LATERALITY: ICD-10-CM

## 2019-01-17 DIAGNOSIS — Z86.39 HISTORY OF HYPERLIPIDEMIA: ICD-10-CM

## 2019-01-17 DIAGNOSIS — N80.5: ICD-10-CM

## 2019-01-17 DIAGNOSIS — Z51.81 ENCOUNTER FOR MONITORING DIURETIC THERAPY: ICD-10-CM

## 2019-01-17 DIAGNOSIS — Z87.39 HISTORY OF SWELLING OF FEET: ICD-10-CM

## 2019-01-17 DIAGNOSIS — F43.9 STRESS: ICD-10-CM

## 2019-01-17 LAB
ANION GAP SERPL CALC-SCNC: 11 MMOL/L (ref 0–18)
BUN SERPL-MCNC: 18 MG/DL (ref 8–20)
BUN/CREAT SERPL: 18 (ref 10–20)
CALCIUM SERPL-MCNC: 9.5 MG/DL (ref 8.5–10.5)
CHLORIDE SERPL-SCNC: 99 MMOL/L (ref 95–110)
CO2 SERPL-SCNC: 26 MMOL/L (ref 22–32)
CREAT SERPL-MCNC: 1 MG/DL (ref 0.5–1.5)
GLUCOSE SERPL-MCNC: 89 MG/DL (ref 70–99)
MAGNESIUM SERPL-MCNC: 1.9 MG/DL (ref 1.8–2.5)
OSMOLALITY UR CALC.SUM OF ELEC: 283 MOSM/KG (ref 275–295)
POTASSIUM SERPL-SCNC: 4 MMOL/L (ref 3.3–5.1)
SODIUM SERPL-SCNC: 136 MMOL/L (ref 136–144)

## 2019-01-17 PROCEDURE — 36415 COLL VENOUS BLD VENIPUNCTURE: CPT | Performed by: INTERNAL MEDICINE

## 2019-01-17 PROCEDURE — 83735 ASSAY OF MAGNESIUM: CPT | Performed by: INTERNAL MEDICINE

## 2019-01-17 PROCEDURE — 99203 OFFICE O/P NEW LOW 30 MIN: CPT | Performed by: INTERNAL MEDICINE

## 2019-01-17 PROCEDURE — 80048 BASIC METABOLIC PNL TOTAL CA: CPT | Performed by: INTERNAL MEDICINE

## 2019-01-17 NOTE — PROGRESS NOTES
Wilfredo Watson is a 48year old female. Patient presents with:  Establish Care: new pt presents to clinic to establish care       HPI:       Patient comes to establish care.   She initially presented July 9, 2018 with acute appendicitis, perforated, for Vision r/t headache   • Migraines    • NEUROCARDIOGENIC DISEASE DX IN 11/08   • Other and unspecified hyperlipidemia       Past Surgical History:   Procedure Laterality Date   • APPENDECTOMY  07/10/2018    Laparoscopic Cecectomy, Drainage of Appendiceal ab Eyes  CARDIOVASCULAR: denies chest pain; with dehydration, occasional palpitations; occasional skipped beats without dizziness.   Denies edema  RESPIRATORY:denies cough, denies shortness of breath, denies wheezingGASTROINTESTINAL: denies abdominal pain, basia 3.3 - 5.1 mmol/L    Chloride 105 95 - 110 mmol/L    CO2 26 22 - 32 mmol/L    BUN 5 (L) 8 - 20 mg/dL    Creatinine 0.82 0.50 - 1.50 mg/dL    Calcium, Total 9.0 8.5 - 10.5 mg/dL    BUN/CREA Ratio 6.1 (L) 10.0 - 20.0    Anion Gap 7 0 - 18 mmol/L    Calculated laparoscopic resection:   · Previous history of low-grade mucinous appendiceal neoplasm ([id number]). · Currently submitted cecum and colon, no evidence of involvement low-grade mucinous appendiceal neoplasm. · Unremarkable terminal ileum.   · Focal endome lymph nodes.  (al)    Desiree Holguin M.D./[facility]        Interpretation Abnormal (A)     [facility] POCT PREGNANCY URINE   Result Value Ref Range    POCT Urine Pregnancy Negative Negative   POCT GLUCOSE   Result Value Ref Range    POC Glucose  143 (H) 70 - 99   POCT GL ABDOMEN+PELVIS         (ALL W+WO) (PPZ=[id number]), BASIC METABOLIC         PANEL (8)        MRI abdomen and pelvis with and without contrast recommended for follow-up 6 months after surgery, rule out recurrence neoplasm.   Other recommendations including

## 2019-01-18 NOTE — PROGRESS NOTES
Pt presented to clinic today for blood draw. Per physician able to draw orders. Orders  documented within chart. Pt tolerated lab draw well.  verified.   Orders drawn include: bmp, mag  Site of draw: rt troy Torres CMA

## 2019-02-04 ENCOUNTER — LAB ENCOUNTER (OUTPATIENT)
Dept: LAB | Facility: HOSPITAL | Age: 54
End: 2019-02-04
Attending: INTERNAL MEDICINE
Payer: COMMERCIAL

## 2019-02-04 ENCOUNTER — OFFICE VISIT (OUTPATIENT)
Dept: INTERNAL MEDICINE CLINIC | Facility: CLINIC | Age: 54
End: 2019-02-04

## 2019-02-04 VITALS
WEIGHT: 242 LBS | DIASTOLIC BLOOD PRESSURE: 82 MMHG | OXYGEN SATURATION: 99 % | RESPIRATION RATE: 19 BRPM | HEART RATE: 61 BPM | SYSTOLIC BLOOD PRESSURE: 124 MMHG | TEMPERATURE: 98 F | BODY MASS INDEX: 36 KG/M2

## 2019-02-04 DIAGNOSIS — Z01.419 ROUTINE GYNECOLOGICAL EXAMINATION: ICD-10-CM

## 2019-02-04 DIAGNOSIS — Z79.899 ENCOUNTER FOR MONITORING DIURETIC THERAPY: ICD-10-CM

## 2019-02-04 DIAGNOSIS — Z86.39 HISTORY OF HYPERLIPIDEMIA: ICD-10-CM

## 2019-02-04 DIAGNOSIS — R31.29 MICROHEMATURIA: ICD-10-CM

## 2019-02-04 DIAGNOSIS — N80.5: ICD-10-CM

## 2019-02-04 DIAGNOSIS — E66.9 OBESITY (BMI 30-39.9): ICD-10-CM

## 2019-02-04 DIAGNOSIS — Z80.0 FAMILY HISTORY OF COLON CANCER: ICD-10-CM

## 2019-02-04 DIAGNOSIS — Z51.81 ENCOUNTER FOR MONITORING DIURETIC THERAPY: ICD-10-CM

## 2019-02-04 DIAGNOSIS — D37.3 LOW GRADE MUCINOUS NEOPLASM OF APPENDIX: Primary | ICD-10-CM

## 2019-02-04 DIAGNOSIS — D37.3 LOW GRADE MUCINOUS NEOPLASM OF APPENDIX: ICD-10-CM

## 2019-02-04 LAB
ALBUMIN SERPL BCP-MCNC: 4 G/DL (ref 3.5–4.8)
ALP SERPL-CCNC: 59 U/L (ref 32–100)
ALT SERPL-CCNC: 48 U/L (ref 14–54)
AST SERPL-CCNC: 32 U/L (ref 15–41)
BASOPHILS # BLD AUTO: 0.04 X10(3) UL (ref 0–0.2)
BASOPHILS NFR BLD AUTO: 0.7 %
BILIRUB DIRECT SERPL-MCNC: 0.2 MG/DL (ref 0–0.2)
BILIRUB SERPL-MCNC: 0.7 MG/DL (ref 0.3–1.2)
BILIRUB UR QL: NEGATIVE
CEA SERPL-MCNC: 0.6 NG/ML (ref 0.5–5)
CEA SERPL-MCNC: 0.6 NG/ML (ref 0–5)
CHOLEST SERPL-MCNC: 234 MG/DL (ref 110–200)
COLOR UR: YELLOW
DEPRECATED RDW RBC AUTO: 40.2 FL (ref 35.1–46.3)
EOSINOPHIL # BLD AUTO: 0.15 X10(3) UL (ref 0–0.7)
EOSINOPHIL NFR BLD AUTO: 2.5 %
ERYTHROCYTE [DISTWIDTH] IN BLOOD BY AUTOMATED COUNT: 12.9 % (ref 11–15)
GLUCOSE UR-MCNC: NEGATIVE MG/DL
HCT VFR BLD AUTO: 44.7 % (ref 35–48)
HDLC SERPL-MCNC: 40 MG/DL
HGB BLD-MCNC: 15.1 G/DL (ref 12–16)
IMM GRANULOCYTES # BLD AUTO: 0.02 X10(3) UL (ref 0–1)
IMM GRANULOCYTES NFR BLD: 0.3 %
KETONES UR-MCNC: NEGATIVE MG/DL
LDLC SERPL CALC-MCNC: 127 MG/DL (ref 0–99)
LYMPHOCYTES # BLD AUTO: 1.94 X10(3) UL (ref 1–4)
LYMPHOCYTES NFR BLD AUTO: 32.3 %
MAGNESIUM SERPL-MCNC: 2 MG/DL (ref 1.8–2.5)
MCH RBC QN AUTO: 29.4 PG (ref 26–34)
MCHC RBC AUTO-ENTMCNC: 33.8 G/DL (ref 31–37)
MCV RBC AUTO: 87.1 FL (ref 80–100)
MONOCYTES # BLD AUTO: 0.54 X10(3) UL (ref 0.1–1)
MONOCYTES NFR BLD AUTO: 9 %
NEUTROPHILS # BLD AUTO: 3.31 X10 (3) UL (ref 1.5–7.7)
NEUTROPHILS # BLD AUTO: 3.31 X10(3) UL (ref 1.5–7.7)
NEUTROPHILS NFR BLD AUTO: 55.2 %
NITRITE UR QL STRIP.AUTO: NEGATIVE
NONHDLC SERPL-MCNC: 194 MG/DL
PH UR: 5 [PH] (ref 5–8)
PLATELET # BLD AUTO: 178 10(3)UL (ref 150–450)
PROT SERPL-MCNC: 6.7 G/DL (ref 5.9–8.4)
PROT UR-MCNC: NEGATIVE MG/DL
RBC # BLD AUTO: 5.13 X10(6)UL (ref 3.8–5.3)
RBC #/AREA URNS AUTO: 8 /HPF
SP GR UR STRIP: 1.03 (ref 1–1.03)
TRIGL SERPL-MCNC: 334 MG/DL (ref 1–149)
TSH SERPL-ACNC: 4.43 UIU/ML (ref 0.45–5.33)
UROBILINOGEN UR STRIP-ACNC: <2
VIT C UR-MCNC: NEGATIVE MG/DL
WBC # BLD AUTO: 6 X10(3) UL (ref 4–11)
WBC #/AREA URNS AUTO: 5 /HPF

## 2019-02-04 PROCEDURE — 36415 COLL VENOUS BLD VENIPUNCTURE: CPT

## 2019-02-04 PROCEDURE — 83735 ASSAY OF MAGNESIUM: CPT

## 2019-02-04 PROCEDURE — 99213 OFFICE O/P EST LOW 20 MIN: CPT | Performed by: INTERNAL MEDICINE

## 2019-02-04 PROCEDURE — 84443 ASSAY THYROID STIM HORMONE: CPT

## 2019-02-04 PROCEDURE — 82378 CARCINOEMBRYONIC ANTIGEN: CPT

## 2019-02-04 PROCEDURE — 81001 URINALYSIS AUTO W/SCOPE: CPT

## 2019-02-04 PROCEDURE — 80061 LIPID PANEL: CPT

## 2019-02-04 PROCEDURE — 80076 HEPATIC FUNCTION PANEL: CPT

## 2019-02-04 PROCEDURE — 85025 COMPLETE CBC W/AUTO DIFF WBC: CPT

## 2019-02-06 NOTE — PROGRESS NOTES
Tomas Britton is a 48year old female. Patient presents with:  Lab Results: pt presents to clinic for follow up on lab results       HPI:       Bassem Gilliam comes for follow-up of labs. She feels well . She did not yet obtain lipid profile or other labs. MAIN OR      Family History   Problem Relation Age of Onset   • Hypertension Mother    • Lipids Mother    • Heart Disorder Mother         heavy smoker   • Other (PVD) Mother    • Diabetes Maternal Grandmother    • Hypertension Maternal Grandmother    • Lip Microhematuria  Plan: URINALYSIS, ROUTINE, URINALYSIS, ROUTINE        Hematuria noted on urinalysis July 2018. CT abdomen kidney stone protocol did not show any calculus, although patient may have had menses since tampon present on CT in vaginal canal..

## 2019-02-20 ENCOUNTER — TELEPHONE (OUTPATIENT)
Dept: INTERNAL MEDICINE CLINIC | Facility: CLINIC | Age: 54
End: 2019-02-20

## 2019-02-20 DIAGNOSIS — E78.2 MIXED HYPERLIPIDEMIA: ICD-10-CM

## 2019-02-20 DIAGNOSIS — R82.81 PYURIA: Primary | ICD-10-CM

## 2019-02-20 DIAGNOSIS — R31.29 MICROSCOPIC HEMATURIA: ICD-10-CM

## 2019-02-26 ENCOUNTER — TELEPHONE (OUTPATIENT)
Dept: INTERNAL MEDICINE CLINIC | Facility: CLINIC | Age: 54
End: 2019-02-26

## 2019-02-26 ENCOUNTER — APPOINTMENT (OUTPATIENT)
Dept: LAB | Facility: HOSPITAL | Age: 54
End: 2019-02-26
Attending: INTERNAL MEDICINE
Payer: COMMERCIAL

## 2019-02-26 DIAGNOSIS — R82.81 PYURIA: ICD-10-CM

## 2019-02-26 DIAGNOSIS — R31.29 MICROSCOPIC HEMATURIA: ICD-10-CM

## 2019-02-26 PROCEDURE — 87086 URINE CULTURE/COLONY COUNT: CPT

## 2019-02-26 NOTE — TELEPHONE ENCOUNTER
Discussed lab results, microscopic hematuria which is abnormal, and hyperlipidemia. Advised to see dietician, urine culture and then f/u.

## 2019-02-28 ENCOUNTER — TELEPHONE (OUTPATIENT)
Dept: INTERNAL MEDICINE CLINIC | Facility: CLINIC | Age: 54
End: 2019-02-28

## 2019-02-28 DIAGNOSIS — R31.29 MICROSCOPIC HEMATURIA: Primary | ICD-10-CM

## 2019-02-28 NOTE — TELEPHONE ENCOUNTER
Discussed urine culture results, which were normal, with patient. No definite etiology noted on previous CT abdomen or and urine culture to explain microscopic hematuria.   To refer to urology

## 2019-03-04 ENCOUNTER — HOSPITAL ENCOUNTER (OUTPATIENT)
Dept: MRI IMAGING | Facility: HOSPITAL | Age: 54
Discharge: HOME | End: 2019-03-04
Attending: INTERNAL MEDICINE
Payer: COMMERCIAL

## 2019-03-04 ENCOUNTER — PATIENT MESSAGE (OUTPATIENT)
Dept: INTERNAL MEDICINE CLINIC | Facility: CLINIC | Age: 54
End: 2019-03-04

## 2019-03-04 DIAGNOSIS — D37.3 LOW GRADE MUCINOUS NEOPLASM OF APPENDIX: ICD-10-CM

## 2019-03-04 DIAGNOSIS — E78.2 MIXED HYPERLIPIDEMIA: Primary | ICD-10-CM

## 2019-03-04 PROCEDURE — 74183 MRI ABD W/O CNTR FLWD CNTR: CPT | Performed by: INTERNAL MEDICINE

## 2019-03-04 PROCEDURE — A9575 INJ GADOTERATE MEGLUMI 0.1ML: HCPCS | Performed by: INTERNAL MEDICINE

## 2019-03-04 PROCEDURE — 72197 MRI PELVIS W/O & W/DYE: CPT | Performed by: INTERNAL MEDICINE

## 2019-03-06 ENCOUNTER — TELEPHONE (OUTPATIENT)
Dept: HEMATOLOGY/ONCOLOGY | Facility: HOSPITAL | Age: 54
End: 2019-03-06

## 2019-03-06 DIAGNOSIS — D12.1 APPENDICEAL MUCINOUS CYSTADENOMA: Primary | ICD-10-CM

## 2019-03-06 NOTE — TELEPHONE ENCOUNTER
Left message on her voice mail. Scan is good. Very tiny amount of fluid in the pelvis which could be physiologic. Reviewed at GI onc conference - felt low risk, but recommended three month interval scan.   If ok - will increase next to 6 months - order pl

## 2019-04-03 ENCOUNTER — TELEPHONE (OUTPATIENT)
Dept: INTERNAL MEDICINE CLINIC | Facility: CLINIC | Age: 54
End: 2019-04-03

## 2019-04-05 ENCOUNTER — PATIENT MESSAGE (OUTPATIENT)
Dept: INTERNAL MEDICINE CLINIC | Facility: CLINIC | Age: 54
End: 2019-04-05

## 2019-05-13 ENCOUNTER — TELEPHONE (OUTPATIENT)
Dept: INTERNAL MEDICINE CLINIC | Facility: CLINIC | Age: 54
End: 2019-05-13

## 2019-05-13 NOTE — TELEPHONE ENCOUNTER
Patient called back-reviewed lipid profie, encourage dietician consult; also, need to see urology due to hematuria.   F/U office

## 2019-05-14 NOTE — TELEPHONE ENCOUNTER
Call patient, advised her she needs to follow-up regarding microscopic hematuria and hyperlipidemia.   Needs to follow-up with  urologist, referral to Dr. José Vu he has already placed in chart; also needs follow-up with dietitian regarding hyperlipidemia,

## 2019-05-14 NOTE — TELEPHONE ENCOUNTER
----- Message from Ana Machuca sent at 5/13/2019  3:50 PM CDT -----  Regarding: FW: Other  Contact: [phone]      ----- Message -----  From: Shashi Ny MD  Sent: 5/10/2019   9:45 PM  To:  Ana Machuca  Subject: FW: Other

## 2019-06-12 ENCOUNTER — OFFICE VISIT (OUTPATIENT)
Dept: INTERNAL MEDICINE CLINIC | Facility: CLINIC | Age: 54
End: 2019-06-12

## 2019-06-12 VITALS
BODY MASS INDEX: 35.55 KG/M2 | DIASTOLIC BLOOD PRESSURE: 84 MMHG | SYSTOLIC BLOOD PRESSURE: 136 MMHG | HEIGHT: 69 IN | TEMPERATURE: 99 F | WEIGHT: 240 LBS | RESPIRATION RATE: 19 BRPM | HEART RATE: 64 BPM | OXYGEN SATURATION: 100 %

## 2019-06-12 DIAGNOSIS — R42 VERTIGO: ICD-10-CM

## 2019-06-12 DIAGNOSIS — R51.9 HEADACHE AROUND THE EYES: ICD-10-CM

## 2019-06-12 DIAGNOSIS — E78.2 MIXED HYPERLIPIDEMIA: ICD-10-CM

## 2019-06-12 DIAGNOSIS — M54.12 CERVICAL RADICULOPATHY: Primary | ICD-10-CM

## 2019-06-12 DIAGNOSIS — R22.32 MASS OF LEFT UPPER EXTREMITY: ICD-10-CM

## 2019-06-12 DIAGNOSIS — D17.21 LIPOMA OF RIGHT FOREARM: ICD-10-CM

## 2019-06-12 DIAGNOSIS — E66.9 OBESITY (BMI 30-39.9): ICD-10-CM

## 2019-06-12 DIAGNOSIS — D37.3 LOW GRADE MUCINOUS NEOPLASM OF APPENDIX: ICD-10-CM

## 2019-06-12 DIAGNOSIS — R31.9 HEMATURIA, UNSPECIFIED TYPE: ICD-10-CM

## 2019-06-12 PROCEDURE — 99214 OFFICE O/P EST MOD 30 MIN: CPT | Performed by: INTERNAL MEDICINE

## 2019-06-12 NOTE — PROGRESS NOTES
Shantelle Ocasio is a 48year old female. Patient presents with:  Neck Pain: pt in c/o neck pain radiating down to left arm       HPI:         Tension left neck at work,  with pain to left arm.       Feels \"knots\" left shoulder, pain left neck/should LAPAROSCOPIC APPENDECTOMY N/A 7/10/2018    Performed by Carlos Guzmán MD at [address] [place] [address] Dr   • LASIK ENHANCEMENT - OD - RIGHT EYE Bilateral    • OTHER SURGICAL HISTORY  1-10-11    flex cysto, dr Pablito Plaza   • OTHER SURGICAL HISTORY  11/2011    Rt knee torn Oral    SpO2: 100%    Weight: 240 lb    Height: 69\"      Body mass index is 35.44 kg/m². Patient is alert, orientated, in no acute distress  HEENT-pupils equal round reactive, extraocular muscles intact. Conjunctive pink, sclerae anicteric.   Tympanic me recheck abdomen    (E66.9) Obesity (BMI 30-39. 9)  Plan: Patient given dietitian consult referral again for obesity, hyperlipidemia, borderline hypertension    (E78.2) Mixed hyperlipidemia  Plan: Referred to dietitian    (R42) Vertigo  Plan: Vertigo with feng

## 2019-06-20 ENCOUNTER — OFFICE VISIT (OUTPATIENT)
Dept: SURGERY | Facility: CLINIC | Age: 54
End: 2019-06-20

## 2019-06-20 VITALS
HEART RATE: 73 BPM | SYSTOLIC BLOOD PRESSURE: 123 MMHG | DIASTOLIC BLOOD PRESSURE: 78 MMHG | WEIGHT: 240 LBS | BODY MASS INDEX: 35 KG/M2

## 2019-06-20 DIAGNOSIS — R31.29 MICROHEMATURIA: Primary | ICD-10-CM

## 2019-06-20 PROCEDURE — 99212 OFFICE O/P EST SF 10 MIN: CPT | Performed by: NURSE PRACTITIONER

## 2019-06-20 PROCEDURE — 99243 OFF/OP CNSLTJ NEW/EST LOW 30: CPT | Performed by: NURSE PRACTITIONER

## 2019-06-20 NOTE — PROGRESS NOTES
HPI:    Patient ID: Cinda Cameron is a 48year old female. HPI     Patient is a 48year old female who presents to the clinic for a consult at the request of Dr. Rocío Perez regarding microhematuria.    Past medical history of arrhythmia, depre Neck pain, into left hand with numbness and tingling   • DEPRESSION    • HERPES SIMPLEX     shingles on right upper arm, genital, not oral   • HYPERLIPIDEMIA    • Low grade mucinous neoplasm of appendix    • Migraines     Blurred Vision r/t headache Yes      Alcohol/week: 0.0 oz    Drug use: No       PHYSICAL EXAM:    Physical Exam   Constitutional: She is oriented to person, place, and time. She appears well-nourished. No distress. HENT:   Head: Normocephalic.    Eyes: Conjunctivae are normal.   Nec

## 2019-06-26 ENCOUNTER — OFFICE VISIT (OUTPATIENT)
Dept: PHYSICAL THERAPY | Age: 54
End: 2019-06-26
Attending: INTERNAL MEDICINE
Payer: COMMERCIAL

## 2019-06-26 DIAGNOSIS — M54.12 CERVICAL RADICULOPATHY: ICD-10-CM

## 2019-06-26 PROCEDURE — 97110 THERAPEUTIC EXERCISES: CPT

## 2019-06-26 PROCEDURE — 97162 PT EVAL MOD COMPLEX 30 MIN: CPT

## 2019-06-26 NOTE — PROGRESS NOTES
SPINE EVALUATION:   Referring Physician: Dr. Jono Marr  Diagnosis: Cervical radiculopathy (M54.12)      Date of Service: 6/26/2019     PATIENT SUMMARY   Jack Molina is a 48year old y/o female who presents to therapy today with complaints of L>R sided ne dizziness, drop attacks    ASSESSMENT  Joseph Seo presents to physical therapy evaluation with primary c/o L>R sided neck pain that radiates into her LUE.  The results of the objective tests and measures show a decrease in C/s ROM/mobiilty, cervicothoracic juncti min  Pec Major:  L mod  Scalenes:  L mod     Special tests:   (-) Aliza  (-) c/s traction  (+) UL tension test    Today’s Treatment and Response:   Pt education was provided on exam findings, treatment diagnosis, treatment plan, expectations, and prognosis. planning and for this course of care. Thank you for your referral. Please co-sign or sign and return this letter via fax as soon as possible to [phone].  If you have any questions, please contact me at Dept: [phone]    Sincerely,  Electronicall

## 2019-06-27 ENCOUNTER — HOSPITAL ENCOUNTER (OUTPATIENT)
Dept: ULTRASOUND IMAGING | Facility: HOSPITAL | Age: 54
Discharge: HOME | End: 2019-06-27
Attending: INTERNAL MEDICINE
Payer: COMMERCIAL

## 2019-06-27 ENCOUNTER — HOSPITAL ENCOUNTER (OUTPATIENT)
Dept: CT IMAGING | Facility: HOSPITAL | Age: 54
Discharge: HOME | End: 2019-06-27
Attending: NURSE PRACTITIONER
Payer: COMMERCIAL

## 2019-06-27 DIAGNOSIS — R31.29 MICROHEMATURIA: ICD-10-CM

## 2019-06-27 DIAGNOSIS — R22.32 MASS OF LEFT UPPER EXTREMITY: ICD-10-CM

## 2019-06-27 LAB — CREAT BLD-MCNC: 0.8 MG/DL (ref 0.55–1.02)

## 2019-06-27 PROCEDURE — 76376 3D RENDER W/INTRP POSTPROCES: CPT | Performed by: NURSE PRACTITIONER

## 2019-06-27 PROCEDURE — 82565 ASSAY OF CREATININE: CPT

## 2019-06-27 PROCEDURE — 76882 US LMTD JT/FCL EVL NVASC XTR: CPT | Performed by: INTERNAL MEDICINE

## 2019-06-27 PROCEDURE — 74178 CT ABD&PLV WO CNTR FLWD CNTR: CPT | Performed by: NURSE PRACTITIONER

## 2019-06-28 ENCOUNTER — TELEPHONE (OUTPATIENT)
Dept: INTERNAL MEDICINE CLINIC | Facility: CLINIC | Age: 54
End: 2019-06-28

## 2019-06-28 ENCOUNTER — OFFICE VISIT (OUTPATIENT)
Dept: PHYSICAL THERAPY | Age: 54
End: 2019-06-28
Attending: INTERNAL MEDICINE
Payer: COMMERCIAL

## 2019-06-28 PROCEDURE — 97140 MANUAL THERAPY 1/> REGIONS: CPT

## 2019-06-28 PROCEDURE — 97110 THERAPEUTIC EXERCISES: CPT

## 2019-06-28 NOTE — PROGRESS NOTES
Dx: Cervical radiculopathy (M54.12)          Insurance (Authorized # of Visits):  6          Authorizing Physician: Dr. Pastor Karimi Next MD visit: none scheduled  Fall Risk: standard         Precautions: n/a             Subjective: Pt reports sitting in an MRI mobs  Supine L first rib mob  Prone Grade III cervicothoracic PA mobilizations  Upper trap stretch (with shoulder depression) 6 x 10 cts  DTM upper trap/levator scap/scalenes/pectoralis                       HEP: Review L neural mobilization; posture; ergo

## 2019-06-28 NOTE — TELEPHONE ENCOUNTER
Pt called looking for Ct results.  Pt is aware Dr. Maxwell Lange is not in the office today and can wait for a call back

## 2019-07-02 ENCOUNTER — OFFICE VISIT (OUTPATIENT)
Dept: PHYSICAL THERAPY | Age: 54
End: 2019-07-02
Attending: INTERNAL MEDICINE
Payer: COMMERCIAL

## 2019-07-02 PROCEDURE — 97110 THERAPEUTIC EXERCISES: CPT

## 2019-07-02 PROCEDURE — 97140 MANUAL THERAPY 1/> REGIONS: CPT

## 2019-07-02 NOTE — PROGRESS NOTES
Dx: Cervical radiculopathy (M54.12)          Insurance (Authorized # of Visits):  6          Authorizing Physician: Dr. James Chau Next MD visit: none scheduled  Fall Risk: standard         Precautions: n/a             Subjective: Pt reports feeling better th cts          Manual therapy:  C/s manual traction 5 x 1 min  Supine C/s rotational Grade II-III mobs  Supine L first rib mob  Prone Grade III cervicothoracic PA mobilizations  Upper trap stretch (with shoulder depression) 6 x 10 cts  DTM upper trap/levator

## 2019-07-03 ENCOUNTER — APPOINTMENT (OUTPATIENT)
Dept: PHYSICAL THERAPY | Age: 54
End: 2019-07-03
Attending: INTERNAL MEDICINE
Payer: COMMERCIAL

## 2019-07-03 ENCOUNTER — HOSPITAL ENCOUNTER (OUTPATIENT)
Dept: MRI IMAGING | Facility: HOSPITAL | Age: 54
Discharge: HOME | End: 2019-07-03
Attending: INTERNAL MEDICINE
Payer: COMMERCIAL

## 2019-07-03 DIAGNOSIS — D12.1 APPENDICEAL MUCINOUS CYSTADENOMA: ICD-10-CM

## 2019-07-03 PROCEDURE — 74183 MRI ABD W/O CNTR FLWD CNTR: CPT | Performed by: INTERNAL MEDICINE

## 2019-07-03 PROCEDURE — A9575 INJ GADOTERATE MEGLUMI 0.1ML: HCPCS | Performed by: INTERNAL MEDICINE

## 2019-07-03 PROCEDURE — 72197 MRI PELVIS W/O & W/DYE: CPT | Performed by: INTERNAL MEDICINE

## 2019-07-09 ENCOUNTER — OFFICE VISIT (OUTPATIENT)
Dept: PHYSICAL THERAPY | Age: 54
End: 2019-07-09
Attending: INTERNAL MEDICINE
Payer: COMMERCIAL

## 2019-07-09 DIAGNOSIS — D12.1 APPENDICEAL MUCINOUS CYSTADENOMA: Primary | ICD-10-CM

## 2019-07-09 PROCEDURE — 97140 MANUAL THERAPY 1/> REGIONS: CPT

## 2019-07-09 PROCEDURE — 97110 THERAPEUTIC EXERCISES: CPT

## 2019-07-09 NOTE — PROGRESS NOTES
Dx: Cervical radiculopathy (M54.12)          Insurance (Authorized # of Visits):  6          Authorizing Physician: Dr. Moraima Fitzpatrick Next MD visit: none scheduled  Fall Risk: standard         Precautions: n/a             Subjective: Pt reports feeling pretty go mobility stretch 8 x 8 cts (passive and active)  Doorway stretch 6 x 10 cts     Therapeutic exercise:  Seated c/s retraction 10x  Seated c/s retraction with extension 10x (with and without buffer)   L shld PROM x 10 min  Pinky \"V\" on wall 10x      Manual

## 2019-07-10 ENCOUNTER — OFFICE VISIT (OUTPATIENT)
Dept: INTERNAL MEDICINE CLINIC | Facility: CLINIC | Age: 54
End: 2019-07-10

## 2019-07-10 VITALS
HEIGHT: 69 IN | OXYGEN SATURATION: 100 % | RESPIRATION RATE: 19 BRPM | TEMPERATURE: 98 F | BODY MASS INDEX: 35.99 KG/M2 | WEIGHT: 243 LBS | DIASTOLIC BLOOD PRESSURE: 82 MMHG | HEART RATE: 60 BPM | SYSTOLIC BLOOD PRESSURE: 130 MMHG

## 2019-07-10 DIAGNOSIS — D17.22 LIPOMA OF LEFT UPPER EXTREMITY: ICD-10-CM

## 2019-07-10 DIAGNOSIS — D37.3 LOW GRADE MUCINOUS NEOPLASM OF APPENDIX: ICD-10-CM

## 2019-07-10 DIAGNOSIS — R31.29 MICROSCOPIC HEMATURIA: ICD-10-CM

## 2019-07-10 DIAGNOSIS — E78.2 MIXED HYPERLIPIDEMIA: ICD-10-CM

## 2019-07-10 DIAGNOSIS — J47.0 BRONCHIECTASIS WITH ACUTE LOWER RESPIRATORY INFECTION (HCC): Primary | ICD-10-CM

## 2019-07-10 DIAGNOSIS — E66.9 OBESITY (BMI 30-39.9): ICD-10-CM

## 2019-07-10 DIAGNOSIS — K76.0 HEPATIC STEATOSIS: ICD-10-CM

## 2019-07-10 PROBLEM — K35.32 PERFORATED APPENDICITIS: Status: RESOLVED | Noted: 2018-07-09 | Resolved: 2019-07-10

## 2019-07-10 PROBLEM — R03.0 ELEVATED BLOOD PRESSURE READING: Status: RESOLVED | Noted: 2018-08-06 | Resolved: 2019-07-10

## 2019-07-10 PROCEDURE — 99214 OFFICE O/P EST MOD 30 MIN: CPT | Performed by: INTERNAL MEDICINE

## 2019-07-10 RX ORDER — AZITHROMYCIN 250 MG/1
TABLET, FILM COATED ORAL
Qty: 6 TABLET | Refills: 0 | Status: SHIPPED | OUTPATIENT
Start: 2019-07-10 | End: 2019-07-31 | Stop reason: ALTCHOICE

## 2019-07-10 RX ORDER — ALBUTEROL SULFATE 90 UG/1
2 AEROSOL, METERED RESPIRATORY (INHALATION) 4 TIMES DAILY
Qty: 1 INHALER | Refills: 0 | Status: SHIPPED | OUTPATIENT
Start: 2019-07-10 | End: 2021-05-11

## 2019-07-10 RX ORDER — ALBUTEROL SULFATE 2.5 MG/3ML
2.5 SOLUTION RESPIRATORY (INHALATION) ONCE
Status: DISCONTINUED | OUTPATIENT
Start: 2019-07-10 | End: 2019-07-10

## 2019-07-10 NOTE — PROGRESS NOTES
Jack Molina is a 48year old female. Patient presents with:   Follow - Up: pt in for follow up on CT and MRI results       HPI:         Had cough beginning 6/21/19, with green phlegm; no fever, chills, infection in chest; took Mucinex couple days, use HISTORY  1-10-11    dr Aster moe   • OTHER SURGICAL HISTORY  11/2011    Rt knee torn meniscus repair   • OTHER SURGICAL HISTORY  08/23/2018    right cb    • OTHER SURGICAL HISTORY      lef t meniscus repair knee   • ROBOT-ASSISTED LAPAROSCOPIC R Heart-S1-S2 normal, no S3 or murmur. Rhythm regular. Abdomen-bowel sounds normal; liver palpated 3 fingerbreadths below right costal margin, no tenderness to palpation. No masses. Extremities-no edema lower extremities.   Soft prominent lipomatous mas evident at the visualized lung bases. OTHER: Probable postsurgical scarring left lower quadrant subcutaneous fat (series 4, images 16-18 for example).   Small fat containing umbilical hernia         CONCLUSION:   No evidence of metastatic disease in the abd CT images of the abdomen and pelvis were obtained without and with non-ionic intravenous contrast material. Automated exposure control for dose reduction was used. Adjustment of the mA and/or kV was done based on the patient's size.  Iterative reconstructio small hiatal hernia is evident. There is no evidence of bowel obstruction. Postoperative changes of right hemicolectomy and appendectomy are evident. There are probable collapsed colonic haustrations. No focal pericolonic fat stranding is identified.   Ther continue Mucinex. Advised copious warm fluids. Call in 10 days if symptoms not resolved, consider repeat course azithromycin.   Refill albuterol inhaler    (D17.22) Lipoma of left upper extremity  Comment: forearm  Plan: SURGERY - INTERNAL            (F94

## 2019-07-11 ENCOUNTER — OFFICE VISIT (OUTPATIENT)
Dept: SURGERY | Facility: CLINIC | Age: 54
End: 2019-07-11

## 2019-07-11 VITALS
WEIGHT: 245 LBS | TEMPERATURE: 99 F | HEART RATE: 66 BPM | DIASTOLIC BLOOD PRESSURE: 78 MMHG | BODY MASS INDEX: 36 KG/M2 | SYSTOLIC BLOOD PRESSURE: 144 MMHG

## 2019-07-11 DIAGNOSIS — R31.29 MICROSCOPIC HEMATURIA: Primary | ICD-10-CM

## 2019-07-11 PROCEDURE — 52000 CYSTOURETHROSCOPY: CPT | Performed by: UROLOGY

## 2019-07-11 RX ORDER — CIPROFLOXACIN 500 MG/1
500 TABLET, FILM COATED ORAL ONCE
Status: DISCONTINUED | OUTPATIENT
Start: 2019-07-11 | End: 2020-01-29 | Stop reason: ALTCHOICE

## 2019-07-11 NOTE — PROCEDURES
Tomas Britton  : 1965  Referring Physician: Valentina Joseph MD    Patient presents with:  Hematuria: PT presents for cystoscopy. PT denies new issues or complaints.        CYSTOURETHROSCOPY    Anesthesia:  2% lidocaine gel    Urethra: Normal

## 2019-07-12 ENCOUNTER — OFFICE VISIT (OUTPATIENT)
Dept: PHYSICAL THERAPY | Age: 54
End: 2019-07-12
Attending: INTERNAL MEDICINE
Payer: COMMERCIAL

## 2019-07-12 PROCEDURE — 97110 THERAPEUTIC EXERCISES: CPT

## 2019-07-12 PROCEDURE — 97140 MANUAL THERAPY 1/> REGIONS: CPT

## 2019-07-12 NOTE — PROGRESS NOTES
Dx: Cervical radiculopathy (M54.12)          Insurance (Authorized # of Visits):  6          Authorizing Physician: Dr. Naman William Next MD visit: none scheduled  Fall Risk: standard         Precautions: n/a             Subjective: Pt reports doing her exercis wall 10x  Therapeutic exercise:  SciFit Retro BUE distance 9 x 5 min   Seated c/s retraction with extension 10x   L shld PROM x 10 min  Prone LUE ext/abd/row/V 2 x 10       Manual therapy:  C/s manual traction 5 x 1 min  Supine C/s rotational Grade II-III

## 2019-07-17 ENCOUNTER — OFFICE VISIT (OUTPATIENT)
Dept: PHYSICAL THERAPY | Age: 54
End: 2019-07-17
Attending: INTERNAL MEDICINE
Payer: COMMERCIAL

## 2019-07-17 PROCEDURE — 97110 THERAPEUTIC EXERCISES: CPT

## 2019-07-17 PROCEDURE — 97140 MANUAL THERAPY 1/> REGIONS: CPT

## 2019-07-17 NOTE — PROGRESS NOTES
DISCHARGE SUMMARY   Dx: Cervical radiculopathy (M54.12)          Insurance (Authorized # of Visits):  6          Authorizing Physician: Dr. Argentina Rodgers Next MD visit: none scheduled  Fall Risk: standard         Precautions: n/a             Subjective: Pt notes exercise:  Seated c/s retraction 10x  Seated c/s retraction with extension 10x (with and without buffer)   L shld PROM x 10 min  Pinky \"V\" on wall 10x  Therapeutic exercise:  SciFit Retro BUE distance 9 x 5 min   Seated c/s retraction with extension 10x

## 2019-07-19 ENCOUNTER — APPOINTMENT (OUTPATIENT)
Dept: PHYSICAL THERAPY | Age: 54
End: 2019-07-19
Attending: INTERNAL MEDICINE
Payer: COMMERCIAL

## 2019-07-24 ENCOUNTER — APPOINTMENT (OUTPATIENT)
Dept: PHYSICAL THERAPY | Age: 54
End: 2019-07-24
Attending: INTERNAL MEDICINE
Payer: COMMERCIAL

## 2019-07-29 ENCOUNTER — PATIENT MESSAGE (OUTPATIENT)
Dept: INTERNAL MEDICINE CLINIC | Facility: CLINIC | Age: 54
End: 2019-07-29

## 2019-07-30 NOTE — TELEPHONE ENCOUNTER
----- Message from Babar Molina MD sent at 7/30/2019  2:09 PM CDT -----  Regarding: FW: Non-Urgent Medical Question  Contact: [phone]  Yonny Ca, could you get Maricarmen Britt in for a quick appointment? Achilles tendon and rash.   Thank you  ---

## 2019-07-31 ENCOUNTER — OFFICE VISIT (OUTPATIENT)
Dept: INTERNAL MEDICINE CLINIC | Facility: CLINIC | Age: 54
End: 2019-07-31

## 2019-07-31 VITALS
HEART RATE: 61 BPM | SYSTOLIC BLOOD PRESSURE: 120 MMHG | OXYGEN SATURATION: 99 % | RESPIRATION RATE: 19 BRPM | WEIGHT: 245 LBS | DIASTOLIC BLOOD PRESSURE: 70 MMHG | TEMPERATURE: 99 F | HEIGHT: 69 IN | BODY MASS INDEX: 36.29 KG/M2

## 2019-07-31 DIAGNOSIS — M76.62 ACHILLES TENDINITIS OF LEFT LOWER EXTREMITY: ICD-10-CM

## 2019-07-31 DIAGNOSIS — R21 RASH AND NONSPECIFIC SKIN ERUPTION: Primary | ICD-10-CM

## 2019-07-31 PROCEDURE — 99213 OFFICE O/P EST LOW 20 MIN: CPT | Performed by: INTERNAL MEDICINE

## 2019-07-31 NOTE — PROGRESS NOTES
Yoel Maguire is a 48year old female. Patient presents with:  Rash: pt in c/o rash on lower legs       HPI:          Pain since 10/18, burning left posterior lower leg and heel; all time. Hurts more when starts walking then usu better.  Painful even a meniscus repair   • OTHER SURGICAL HISTORY  08/23/2018    right cb    • OTHER SURGICAL HISTORY      lef t meniscus repair knee   • ROBOT-ASSISTED LAPAROSCOPIC RIGHT COLON RESECTION Right 8/23/2018    Performed by Jhonny Carolina MD at [address] [place] [address] above heel, and also upper heel area. No tenderness to palpation lower heel or plantar area of foot. No redness or swelling.   Some discomfort with flexion of the foot, like a tightness    Objectives:  Results for orders placed or performed in visit on 07 3/04/2019, 8:45. INDICATIONS: D12.1 Benign neoplasm of appendix. Post right hemicolectomy. Trace ascites on prior study.   TECHNIQUE: Multiplanar multisequence pre-and postcontrast MRI images of the abdomen and pelvis were obtained for evaluation using I persistent pain, with inability to do much exercise, consider MRI of foot            Imaging & Consults:  None    Meds & Refills for this Visit:   Requested Prescriptions      No prescriptions requested or ordered in this encounter       There are no Patie

## 2019-08-07 ENCOUNTER — HOSPITAL ENCOUNTER (OUTPATIENT)
Dept: GENERAL RADIOLOGY | Facility: HOSPITAL | Age: 54
Discharge: HOME | End: 2019-08-07
Attending: INTERNAL MEDICINE
Payer: COMMERCIAL

## 2019-08-07 DIAGNOSIS — M76.62 ACHILLES TENDINITIS OF LEFT LOWER EXTREMITY: ICD-10-CM

## 2019-08-07 PROBLEM — R22.32 MASS OF LEFT FOREARM: Status: ACTIVE | Noted: 2019-08-07

## 2019-08-07 PROCEDURE — 73630 X-RAY EXAM OF FOOT: CPT | Performed by: INTERNAL MEDICINE

## 2019-08-09 ENCOUNTER — PATIENT MESSAGE (OUTPATIENT)
Dept: INTERNAL MEDICINE CLINIC | Facility: CLINIC | Age: 54
End: 2019-08-09

## 2019-08-09 DIAGNOSIS — M76.60 ACHILLES TENDINITIS, UNSPECIFIED LATERALITY: Primary | ICD-10-CM

## 2019-08-13 ENCOUNTER — LAB REQUISITION (OUTPATIENT)
Dept: LAB | Facility: HOSPITAL | Age: 54
End: 2019-08-13
Payer: COMMERCIAL

## 2019-08-13 DIAGNOSIS — Z01.89 ENCOUNTER FOR OTHER SPECIFIED SPECIAL EXAMINATIONS: ICD-10-CM

## 2019-08-13 PROCEDURE — 88304 TISSUE EXAM BY PATHOLOGIST: CPT | Performed by: SURGERY

## 2019-12-17 ENCOUNTER — HOSPITAL ENCOUNTER (OUTPATIENT)
Dept: MRI IMAGING | Facility: HOSPITAL | Age: 54
Discharge: HOME | End: 2019-12-17
Attending: INTERNAL MEDICINE
Payer: COMMERCIAL

## 2019-12-17 DIAGNOSIS — D12.1 APPENDICEAL MUCINOUS CYSTADENOMA: ICD-10-CM

## 2019-12-17 PROCEDURE — A9575 INJ GADOTERATE MEGLUMI 0.1ML: HCPCS

## 2019-12-17 PROCEDURE — 72197 MRI PELVIS W/O & W/DYE: CPT | Performed by: INTERNAL MEDICINE

## 2019-12-17 PROCEDURE — 74183 MRI ABD W/O CNTR FLWD CNTR: CPT | Performed by: INTERNAL MEDICINE

## 2020-01-17 ENCOUNTER — PATIENT MESSAGE (OUTPATIENT)
Dept: INTERNAL MEDICINE CLINIC | Facility: CLINIC | Age: 55
End: 2020-01-17

## 2020-01-17 DIAGNOSIS — E78.2 MIXED HYPERLIPIDEMIA: ICD-10-CM

## 2020-01-17 DIAGNOSIS — D37.3 LOW GRADE MUCINOUS NEOPLASM OF APPENDIX: ICD-10-CM

## 2020-01-17 DIAGNOSIS — R31.29 MICROSCOPIC HEMATURIA: Primary | ICD-10-CM

## 2020-01-17 DIAGNOSIS — Z86.39 HISTORY OF HYPERLIPIDEMIA: ICD-10-CM

## 2020-01-17 DIAGNOSIS — E66.9 OBESITY (BMI 30-39.9): ICD-10-CM

## 2020-01-19 NOTE — TELEPHONE ENCOUNTER
From: Debbie Brar  To: Jim Mclean MD  Sent: 1/17/2020 11:46 AM CST  Subject: Non-Urgent Medical Question    Can you please put in an order for blood work so it is done and can be talked about at my appointment on Wednesday?      Thank you!!

## 2020-01-22 ENCOUNTER — LAB ENCOUNTER (OUTPATIENT)
Dept: LAB | Facility: HOSPITAL | Age: 55
End: 2020-01-22
Attending: INTERNAL MEDICINE
Payer: COMMERCIAL

## 2020-01-22 DIAGNOSIS — E78.2 MIXED HYPERLIPIDEMIA: ICD-10-CM

## 2020-01-22 DIAGNOSIS — Z86.39 HISTORY OF HYPERLIPIDEMIA: ICD-10-CM

## 2020-01-22 DIAGNOSIS — R31.29 MICROSCOPIC HEMATURIA: ICD-10-CM

## 2020-01-22 DIAGNOSIS — E66.9 OBESITY (BMI 30-39.9): ICD-10-CM

## 2020-01-22 DIAGNOSIS — D37.3 LOW GRADE MUCINOUS NEOPLASM OF APPENDIX: ICD-10-CM

## 2020-01-22 LAB
25(OH)D3 SERPL-MCNC: 12.7 NG/ML (ref 30–100)
ALBUMIN SERPL-MCNC: 3.7 G/DL (ref 3.4–5)
ALBUMIN/GLOB SERPL: 1.1 {RATIO} (ref 1–2)
ALP LIVER SERPL-CCNC: 71 U/L (ref 41–108)
ALT SERPL-CCNC: 104 U/L (ref 13–56)
ANION GAP SERPL CALC-SCNC: 4 MMOL/L (ref 0–18)
AST SERPL-CCNC: 53 U/L (ref 15–37)
BACTERIA UR QL AUTO: NEGATIVE /HPF
BASOPHILS # BLD AUTO: 0.02 X10(3) UL (ref 0–0.2)
BASOPHILS NFR BLD AUTO: 0.5 %
BILIRUB SERPL-MCNC: 0.7 MG/DL (ref 0.1–2)
BILIRUB UR QL: NEGATIVE
BUN BLD-MCNC: 12 MG/DL (ref 7–18)
BUN/CREAT SERPL: 12.6 (ref 10–20)
CALCIUM BLD-MCNC: 9.1 MG/DL (ref 8.5–10.1)
CANCER AG125 SERPL-ACNC: 11.7 U/ML (ref ?–35)
CEA SERPL-MCNC: 0.8 NG/ML (ref ?–5)
CHLORIDE SERPL-SCNC: 107 MMOL/L (ref 98–112)
CHOLEST SMN-MCNC: 233 MG/DL (ref ?–200)
CLARITY UR: CLEAR
CO2 SERPL-SCNC: 30 MMOL/L (ref 21–32)
COLOR UR: YELLOW
CREAT BLD-MCNC: 0.95 MG/DL (ref 0.55–1.02)
CREAT UR-SCNC: 287 MG/DL
DEPRECATED RDW RBC AUTO: 38 FL (ref 35.1–46.3)
EOSINOPHIL # BLD AUTO: 0.11 X10(3) UL (ref 0–0.7)
EOSINOPHIL NFR BLD AUTO: 2.7 %
ERYTHROCYTE [DISTWIDTH] IN BLOOD BY AUTOMATED COUNT: 12.2 % (ref 11–15)
ERYTHROCYTE [SEDIMENTATION RATE] IN BLOOD: 17 MM/HR (ref 0–30)
EST. AVERAGE GLUCOSE BLD GHB EST-MCNC: 105 MG/DL (ref 68–126)
FOLATE SERPL-MCNC: 18.1 NG/ML (ref 8.7–?)
GLOBULIN PLAS-MCNC: 3.4 G/DL (ref 2.8–4.4)
GLUCOSE BLD-MCNC: 88 MG/DL (ref 70–99)
GLUCOSE UR-MCNC: NEGATIVE MG/DL
HBA1C MFR BLD HPLC: 5.3 % (ref ?–5.7)
HCT VFR BLD AUTO: 42.5 % (ref 35–48)
HDLC SERPL-MCNC: 41 MG/DL (ref 40–59)
HGB BLD-MCNC: 14.6 G/DL (ref 12–16)
IMM GRANULOCYTES # BLD AUTO: 0.01 X10(3) UL (ref 0–1)
IMM GRANULOCYTES NFR BLD: 0.2 %
KETONES UR-MCNC: NEGATIVE MG/DL
LDLC SERPL CALC-MCNC: 150 MG/DL (ref ?–100)
LEUKOCYTE ESTERASE UR QL STRIP.AUTO: NEGATIVE
LYMPHOCYTES # BLD AUTO: 1.15 X10(3) UL (ref 1–4)
LYMPHOCYTES NFR BLD AUTO: 28.3 %
M PROTEIN MFR SERPL ELPH: 7.1 G/DL (ref 6.4–8.2)
MCH RBC QN AUTO: 29.3 PG (ref 26–34)
MCHC RBC AUTO-ENTMCNC: 34.4 G/DL (ref 31–37)
MCV RBC AUTO: 85.3 FL (ref 80–100)
MICROALBUMIN UR-MCNC: 1.41 MG/DL
MICROALBUMIN/CREAT 24H UR-RTO: 4.9 UG/MG (ref ?–30)
MONOCYTES # BLD AUTO: 0.4 X10(3) UL (ref 0.1–1)
MONOCYTES NFR BLD AUTO: 9.8 %
NEUTROPHILS # BLD AUTO: 2.38 X10 (3) UL (ref 1.5–7.7)
NEUTROPHILS # BLD AUTO: 2.38 X10(3) UL (ref 1.5–7.7)
NEUTROPHILS NFR BLD AUTO: 58.5 %
NITRITE UR QL STRIP.AUTO: NEGATIVE
NONHDLC SERPL-MCNC: 192 MG/DL (ref ?–130)
OSMOLALITY SERPL CALC.SUM OF ELEC: 291 MOSM/KG (ref 275–295)
PATIENT FASTING Y/N/NP: YES
PATIENT FASTING Y/N/NP: YES
PH UR: 5 [PH] (ref 5–8)
PLATELET # BLD AUTO: 167 10(3)UL (ref 150–450)
POTASSIUM SERPL-SCNC: 4 MMOL/L (ref 3.5–5.1)
PROT UR-MCNC: NEGATIVE MG/DL
RBC # BLD AUTO: 4.98 X10(6)UL (ref 3.8–5.3)
RBC #/AREA URNS AUTO: 2 /HPF
SODIUM SERPL-SCNC: 141 MMOL/L (ref 136–145)
SP GR UR STRIP: 1.02 (ref 1–1.03)
T4 FREE SERPL-MCNC: 1 NG/DL (ref 0.8–1.7)
TRIGL SERPL-MCNC: 208 MG/DL (ref 30–149)
TSI SER-ACNC: 4.71 MIU/ML (ref 0.36–3.74)
UROBILINOGEN UR STRIP-ACNC: <2
VIT B12 SERPL-MCNC: 512 PG/ML (ref 193–986)
VLDLC SERPL CALC-MCNC: 42 MG/DL (ref 0–30)
WBC # BLD AUTO: 4.1 X10(3) UL (ref 4–11)
WBC #/AREA URNS AUTO: 1 /HPF

## 2020-01-22 PROCEDURE — 93005 ELECTROCARDIOGRAM TRACING: CPT

## 2020-01-22 PROCEDURE — 85652 RBC SED RATE AUTOMATED: CPT

## 2020-01-22 PROCEDURE — 82378 CARCINOEMBRYONIC ANTIGEN: CPT

## 2020-01-22 PROCEDURE — 80053 COMPREHEN METABOLIC PANEL: CPT

## 2020-01-22 PROCEDURE — 84443 ASSAY THYROID STIM HORMONE: CPT

## 2020-01-22 PROCEDURE — 82043 UR ALBUMIN QUANTITATIVE: CPT

## 2020-01-22 PROCEDURE — 86304 IMMUNOASSAY TUMOR CA 125: CPT

## 2020-01-22 PROCEDURE — 84439 ASSAY OF FREE THYROXINE: CPT

## 2020-01-22 PROCEDURE — 36415 COLL VENOUS BLD VENIPUNCTURE: CPT

## 2020-01-22 PROCEDURE — 85025 COMPLETE CBC W/AUTO DIFF WBC: CPT

## 2020-01-22 PROCEDURE — 83036 HEMOGLOBIN GLYCOSYLATED A1C: CPT

## 2020-01-22 PROCEDURE — 93010 ELECTROCARDIOGRAM REPORT: CPT | Performed by: INTERNAL MEDICINE

## 2020-01-22 PROCEDURE — 82746 ASSAY OF FOLIC ACID SERUM: CPT

## 2020-01-22 PROCEDURE — 82306 VITAMIN D 25 HYDROXY: CPT

## 2020-01-22 PROCEDURE — 81001 URINALYSIS AUTO W/SCOPE: CPT

## 2020-01-22 PROCEDURE — 80061 LIPID PANEL: CPT

## 2020-01-22 PROCEDURE — 82607 VITAMIN B-12: CPT

## 2020-01-22 PROCEDURE — 82570 ASSAY OF URINE CREATININE: CPT

## 2020-01-29 ENCOUNTER — OFFICE VISIT (OUTPATIENT)
Dept: INTERNAL MEDICINE CLINIC | Facility: CLINIC | Age: 55
End: 2020-01-29

## 2020-01-29 VITALS
WEIGHT: 243 LBS | OXYGEN SATURATION: 99 % | SYSTOLIC BLOOD PRESSURE: 134 MMHG | RESPIRATION RATE: 20 BRPM | HEIGHT: 69 IN | TEMPERATURE: 99 F | DIASTOLIC BLOOD PRESSURE: 76 MMHG | BODY MASS INDEX: 35.99 KG/M2 | HEART RATE: 69 BPM

## 2020-01-29 DIAGNOSIS — Z00.00 ANNUAL PHYSICAL EXAM: Primary | ICD-10-CM

## 2020-01-29 DIAGNOSIS — E78.2 MIXED HYPERLIPIDEMIA: ICD-10-CM

## 2020-01-29 DIAGNOSIS — L60.0 INGROWN RIGHT BIG TOENAIL: ICD-10-CM

## 2020-01-29 DIAGNOSIS — R00.2 PALPITATIONS: ICD-10-CM

## 2020-01-29 DIAGNOSIS — N80.5: ICD-10-CM

## 2020-01-29 DIAGNOSIS — E66.9 OBESITY (BMI 30-39.9): ICD-10-CM

## 2020-01-29 DIAGNOSIS — N63.0 BREAST NODULE: ICD-10-CM

## 2020-01-29 DIAGNOSIS — R31.29 MICROSCOPIC HEMATURIA: ICD-10-CM

## 2020-01-29 DIAGNOSIS — K76.0 HEPATIC STEATOSIS: ICD-10-CM

## 2020-01-29 DIAGNOSIS — D37.3 LOW GRADE MUCINOUS NEOPLASM OF APPENDIX: ICD-10-CM

## 2020-01-29 DIAGNOSIS — D17.79 LIPOMA OF OTHER SPECIFIED SITES: ICD-10-CM

## 2020-01-29 DIAGNOSIS — E55.9 VITAMIN D DEFICIENCY: ICD-10-CM

## 2020-01-29 DIAGNOSIS — E03.9 ACQUIRED HYPOTHYROIDISM: ICD-10-CM

## 2020-01-29 DIAGNOSIS — Z23 IMMUNIZATION DUE: ICD-10-CM

## 2020-01-29 DIAGNOSIS — M76.60 ACHILLES TENDINITIS, UNSPECIFIED LATERALITY: ICD-10-CM

## 2020-01-29 DIAGNOSIS — N84.1 CERVICAL POLYP: ICD-10-CM

## 2020-01-29 DIAGNOSIS — R74.01 ELEVATED TRANSAMINASE LEVEL: ICD-10-CM

## 2020-01-29 DIAGNOSIS — D17.21 LIPOMA OF RIGHT FOREARM: ICD-10-CM

## 2020-01-29 PROCEDURE — 88175 CYTOPATH C/V AUTO FLUID REDO: CPT | Performed by: INTERNAL MEDICINE

## 2020-01-29 PROCEDURE — 99396 PREV VISIT EST AGE 40-64: CPT | Performed by: INTERNAL MEDICINE

## 2020-01-29 RX ORDER — LEVOTHYROXINE SODIUM 88 UG/1
88 CAPSULE ORAL
Qty: 90 CAPSULE | Refills: 0 | Status: SHIPPED | OUTPATIENT
Start: 2020-01-29 | End: 2020-02-28

## 2020-01-29 NOTE — PROGRESS NOTES
Sebas Hennessy is a 50year old female. Patient presents with:  Physical: pt in for cpx w/ pap smear, needs mammo order       HPI:         Had influenza illness a few days prior to lab test.  Does not drink daily. 2 beer on weekends.   No dizziness tho H 1-10-11    dr Opal moe   • OTHER SURGICAL HISTORY  11/2011    Rt knee torn meniscus repair   • OTHER SURGICAL HISTORY  08/23/2018    right cb    • OTHER SURGICAL HISTORY      lef t meniscus repair knee   • ROBOT-ASSISTED LAPAROSCOPIC RIGHT COLO distress  HEENT-pupils equal round reactive to light and accommodation, extraocular muscles intact. Conjunctive pink, sclerae anicteric  Neck-supple, no carotid bruits, no adenopathy.   Thyroid normal.  Lungs-clear to auscultation and percussion  Breast-1 Report       Gynecologic Cytology                              Case: [id number]                                  Authorizing Provider:  Tiffanie Ramirez MD  Collected:           01/29/2020 11:06 AM          Ordering Location:     [address] Lipoma prepubic area.   Patient can reconsult surgeon regarding removal    (N84.1) Cervical polyp  Plan: OBG - INTERNAL            (L60.0) Ingrown right big toenail  Comment: hx Achilles tendonitis  Plan: PODIATRY - INTERNAL          (N63.0) Breast nodule

## 2020-02-10 ENCOUNTER — OFFICE VISIT (OUTPATIENT)
Dept: PODIATRY CLINIC | Facility: CLINIC | Age: 55
End: 2020-02-10

## 2020-02-10 DIAGNOSIS — M79.674 PAIN OF TOE OF RIGHT FOOT: ICD-10-CM

## 2020-02-10 DIAGNOSIS — B35.1 ONYCHOMYCOSIS: ICD-10-CM

## 2020-02-10 DIAGNOSIS — L60.0 ONYCHOCRYPTOSIS: ICD-10-CM

## 2020-02-10 DIAGNOSIS — M65.28 CALCIFIC ACHILLES TENDINITIS OF LEFT LOWER EXTREMITY: ICD-10-CM

## 2020-02-10 PROCEDURE — 99203 OFFICE O/P NEW LOW 30 MIN: CPT | Performed by: PODIATRIST

## 2020-02-10 NOTE — PROGRESS NOTES
Debbie Brar is a 50year old female. Patient presents with:  Consult: right foot, right hallux, ingrown toenail that it very painful. no drainage. pedicures are painful and don't help it. was told it was infected 3 pedicures ago/before mal.   pt st Migraines     Blurred Vision r/t headache   • Migraines    • NEUROCARDIOGENIC DISEASE DX IN 11/08   • Other and unspecified hyperlipidemia       Past Surgical History:   Procedure Laterality Date   • APPENDECTOMY  07/10/2018    Laparoscopic CecectomyRaghav use: Yes        Alcohol/week: 0.0 standard drinks      Drug use: No      Sexual activity: Yes        Partners: Male        Birth control/protection: Vasectomy    Other Topics      Concerns:        Caffeine Concern: No        Seat Belt: Yes          REVIEW therapy after having a toenail procedure done which she wants to proceed with on the lateral nail border. We will do that first get her into physical therapy about a week later. I will see her in a 1 to 2-week period for the procedure.     The patient ind

## 2020-02-11 ENCOUNTER — HOSPITAL ENCOUNTER (OUTPATIENT)
Dept: NUTRITION | Facility: HOSPITAL | Age: 55
Discharge: HOME | End: 2020-02-11
Attending: INTERNAL MEDICINE
Payer: COMMERCIAL

## 2020-02-11 DIAGNOSIS — K76.0 HEPATIC STEATOSIS: ICD-10-CM

## 2020-02-11 DIAGNOSIS — E78.2 MIXED HYPERLIPIDEMIA: ICD-10-CM

## 2020-02-11 DIAGNOSIS — E66.9 OBESITY (BMI 30-39.9): ICD-10-CM

## 2020-02-11 PROCEDURE — 97802 MEDICAL NUTRITION INDIV IN: CPT | Performed by: DIETITIAN, REGISTERED

## 2020-02-11 NOTE — PROGRESS NOTES
Nutrition Assessment    Wilfredo Watson is a 50year old female. Referred by: Attending  Referring Physician Name: Sharla Moya    Assessment     Medical Nutrition Therapy Comment: Mixed lipidemia, hepatic stenosis, Obesity  Visit Information:  In intake as evidenced by BMI of 35    Intervention     Nutrition Education: Recommended Modification  Nutrition/Diet Handouts Given: Weight Loss Diet Handouts:  Weight Management Packet Includes: Calorie/Carb Counting, Portion Size Control and Food Journal

## 2020-03-17 ENCOUNTER — HOSPITAL ENCOUNTER (OUTPATIENT)
Dept: MAMMOGRAPHY | Facility: HOSPITAL | Age: 55
Discharge: HOME | End: 2020-03-17
Attending: INTERNAL MEDICINE
Payer: COMMERCIAL

## 2020-03-17 ENCOUNTER — HOSPITAL ENCOUNTER (OUTPATIENT)
Dept: ULTRASOUND IMAGING | Facility: HOSPITAL | Age: 55
Discharge: HOME | End: 2020-03-17
Attending: INTERNAL MEDICINE
Payer: COMMERCIAL

## 2020-03-17 DIAGNOSIS — N63.0 BREAST NODULE: ICD-10-CM

## 2020-03-17 PROCEDURE — 77062 BREAST TOMOSYNTHESIS BI: CPT | Performed by: INTERNAL MEDICINE

## 2020-03-17 PROCEDURE — 76642 ULTRASOUND BREAST LIMITED: CPT | Performed by: INTERNAL MEDICINE

## 2020-03-17 PROCEDURE — 77066 DX MAMMO INCL CAD BI: CPT | Performed by: INTERNAL MEDICINE

## 2020-03-18 ENCOUNTER — TELEPHONE (OUTPATIENT)
Dept: PODIATRY CLINIC | Facility: CLINIC | Age: 55
End: 2020-03-18

## 2020-03-18 NOTE — TELEPHONE ENCOUNTER
S/w pt, she has questions about the result. I advised pt to discuss her questions with dr Cameron Abraham at [facility] tomorrow. Pt is agreeable.

## 2020-03-19 ENCOUNTER — OFFICE VISIT (OUTPATIENT)
Dept: PODIATRY CLINIC | Facility: CLINIC | Age: 55
End: 2020-03-19

## 2020-03-19 VITALS — SYSTOLIC BLOOD PRESSURE: 150 MMHG | HEART RATE: 64 BPM | DIASTOLIC BLOOD PRESSURE: 71 MMHG | RESPIRATION RATE: 18 BRPM

## 2020-03-19 DIAGNOSIS — L60.0 ONYCHOCRYPTOSIS: Primary | ICD-10-CM

## 2020-03-19 DIAGNOSIS — M79.674 PAIN OF TOE OF RIGHT FOOT: ICD-10-CM

## 2020-03-19 PROCEDURE — 11750 EXCISION NAIL&NAIL MATRIX: CPT | Performed by: PODIATRIST

## 2020-03-19 RX ORDER — AZITHROMYCIN 250 MG/1
TABLET, FILM COATED ORAL
Qty: 1 PACKAGE | Refills: 0 | Status: SHIPPED | OUTPATIENT
Start: 2020-03-19 | End: 2020-05-26 | Stop reason: ALTCHOICE

## 2020-03-19 NOTE — PROGRESS NOTES
Per Dr. Sara Guajardo, draw up 5 mL of 0.5 % Marcaine for injection to right foot. Delfina Portillo    Instruction sheet given for post procedure care for ingrown toenail. Patient left office prior to obtaining post procedure vitals.

## 2020-03-22 ENCOUNTER — TELEPHONE (OUTPATIENT)
Dept: INTERNAL MEDICINE CLINIC | Facility: CLINIC | Age: 55
End: 2020-03-22

## 2020-03-22 NOTE — PROGRESS NOTES
Aliya Tang is a 50year old female.  Patient presents with:  Ingrown Toenail: Right big toe f/u - here for procedure - no drainage, has sensitivity to touch         HPI:   Patient presents to the clinic with a chief complaint of a painful ingrown toenai OR   • LASIK ENHANCEMENT - OD - RIGHT EYE Bilateral    • OTHER SURGICAL HISTORY  1-10-11    dr Eddie moe   • OTHER SURGICAL HISTORY  11/2011    Rt knee torn meniscus repair   • OTHER SURGICAL HISTORY  08/23/2018    right cb    • OTHER SURGICAL H headaches    EXAM:   /71 (BP Location: Right arm)   Pulse 64   Resp 18   GENERAL: well developed, well nourished, in no apparent distress  EXTREMITIES:   1. Integument: There is incurvation at the lateral edge.   Painful to palpation but is not red in dispensed follow-up in 1 week    The patient indicates understanding of these issues and agrees to the plan.     Salvatore Hayes DPM

## 2020-03-23 NOTE — TELEPHONE ENCOUNTER
Called patient, to remind her to obtain CD or disc of previous mammogram to bring to [facility] radiology/mammography department for comparison to present mammogram.  She states that she will do this, and afterward will determine recommendations for breast d

## 2020-05-06 ENCOUNTER — PATIENT MESSAGE (OUTPATIENT)
Dept: INTERNAL MEDICINE CLINIC | Facility: CLINIC | Age: 55
End: 2020-05-06

## 2020-05-07 RX ORDER — LEVOTHYROXINE SODIUM 88 UG/1
CAPSULE ORAL
Qty: 90 CAPSULE | Refills: 0 | Status: SHIPPED | OUTPATIENT
Start: 2020-05-07 | End: 2020-11-09 | Stop reason: ALTCHOICE

## 2020-05-07 RX ORDER — LEVOTHYROXINE SODIUM 88 UG/1
1 CAPSULE ORAL
Qty: 30 CAPSULE | Refills: 0 | Status: SHIPPED | OUTPATIENT
Start: 2020-05-07 | End: 2020-11-09 | Stop reason: ALTCHOICE

## 2020-05-07 RX ORDER — LEVOTHYROXINE SODIUM 88 UG/1
CAPSULE ORAL
COMMUNITY
End: 2020-05-07

## 2020-05-07 NOTE — TELEPHONE ENCOUNTER
From: Isabel Harris  To: Giles Castano MD  Sent: 5/6/2020 5:24 PM CDT  Subject: Prescription Question    My prescription for thyroid meds ran out today. I think martir has been trying to get in touch with you.  Please let me know what you would

## 2020-05-26 ENCOUNTER — PATIENT MESSAGE (OUTPATIENT)
Dept: PODIATRY CLINIC | Facility: CLINIC | Age: 55
End: 2020-05-26

## 2020-05-26 ENCOUNTER — OFFICE VISIT (OUTPATIENT)
Dept: INTERNAL MEDICINE CLINIC | Facility: CLINIC | Age: 55
End: 2020-05-26

## 2020-05-26 VITALS
BODY MASS INDEX: 35.7 KG/M2 | WEIGHT: 241 LBS | DIASTOLIC BLOOD PRESSURE: 70 MMHG | SYSTOLIC BLOOD PRESSURE: 136 MMHG | RESPIRATION RATE: 19 BRPM | HEIGHT: 69 IN | OXYGEN SATURATION: 98 % | HEART RATE: 60 BPM

## 2020-05-26 DIAGNOSIS — R74.8 ABNORMAL TRANSAMINASES: ICD-10-CM

## 2020-05-26 DIAGNOSIS — I70.0 ATHEROSCLEROSIS OF AORTA (HCC): ICD-10-CM

## 2020-05-26 DIAGNOSIS — J47.9 BRONCHIECTASIS WITHOUT COMPLICATION (HCC): ICD-10-CM

## 2020-05-26 DIAGNOSIS — E55.9 VITAMIN D DEFICIENCY: ICD-10-CM

## 2020-05-26 DIAGNOSIS — R31.29 MICROSCOPIC HEMATURIA: ICD-10-CM

## 2020-05-26 DIAGNOSIS — D37.3 LOW GRADE MUCINOUS NEOPLASM OF APPENDIX: Primary | ICD-10-CM

## 2020-05-26 DIAGNOSIS — E78.2 MIXED HYPERLIPIDEMIA: ICD-10-CM

## 2020-05-26 DIAGNOSIS — E03.9 ACQUIRED HYPOTHYROIDISM: ICD-10-CM

## 2020-05-26 DIAGNOSIS — R16.2 HEPATOMEGALY WITH SPLENOMEGALY, NOT ELSEWHERE CLASSIFIED: ICD-10-CM

## 2020-05-26 DIAGNOSIS — K76.0 HEPATIC STEATOSIS: ICD-10-CM

## 2020-05-26 DIAGNOSIS — N64.89 BREAST ASYMMETRY: ICD-10-CM

## 2020-05-26 DIAGNOSIS — D13.5 ADENOMYOMATOSIS OF GALLBLADDER: ICD-10-CM

## 2020-05-26 PROCEDURE — 86704 HEP B CORE ANTIBODY TOTAL: CPT | Performed by: INTERNAL MEDICINE

## 2020-05-26 PROCEDURE — 84460 ALANINE AMINO (ALT) (SGPT): CPT | Performed by: INTERNAL MEDICINE

## 2020-05-26 PROCEDURE — 36415 COLL VENOUS BLD VENIPUNCTURE: CPT | Performed by: INTERNAL MEDICINE

## 2020-05-26 PROCEDURE — 80500 HEPATITIS A B + C PROFILE: CPT | Performed by: INTERNAL MEDICINE

## 2020-05-26 PROCEDURE — 82103 ALPHA-1-ANTITRYPSIN TOTAL: CPT | Performed by: INTERNAL MEDICINE

## 2020-05-26 PROCEDURE — 87340 HEPATITIS B SURFACE AG IA: CPT | Performed by: INTERNAL MEDICINE

## 2020-05-26 PROCEDURE — 84450 TRANSFERASE (AST) (SGOT): CPT | Performed by: INTERNAL MEDICINE

## 2020-05-26 PROCEDURE — 86803 HEPATITIS C AB TEST: CPT | Performed by: INTERNAL MEDICINE

## 2020-05-26 PROCEDURE — 84443 ASSAY THYROID STIM HORMONE: CPT | Performed by: INTERNAL MEDICINE

## 2020-05-26 PROCEDURE — 86708 HEPATITIS A ANTIBODY: CPT | Performed by: INTERNAL MEDICINE

## 2020-05-26 PROCEDURE — 99215 OFFICE O/P EST HI 40 MIN: CPT | Performed by: INTERNAL MEDICINE

## 2020-05-26 PROCEDURE — 86706 HEP B SURFACE ANTIBODY: CPT | Performed by: INTERNAL MEDICINE

## 2020-05-26 NOTE — TELEPHONE ENCOUNTER
2/10/20 OV pt was seen and left foot was discussed, but I do not see anything about sx.  Please advise

## 2020-05-26 NOTE — PROGRESS NOTES
Pt presented to clinic today for blood draw. Per physician able to draw orders. Orders  documented within chart. Pt tolerated lab draw well.  verified.   Orders drawn include:hep a b +c, tsh, ast, alt, alpha  Site of draw: rt troy Marti, ESTEBAN

## 2020-05-26 NOTE — PROGRESS NOTES
Jack Molina is a 50year old female. Patient presents with: Follow - Up: pt in for follow up + recheck tsh levels       HPI:         Rosana Lopez feels generally well.   She had cysto urethroscopy July 11, 2019, which was normal.  She denies any abnormalitie ARTHROSCOPY Left 3/13/2015    Performed by Yadira Cox MD at [facility] MAIN OR   • LAPAROSCOPIC APPENDECTOMY N/A 7/10/2018    Performed by Anitra Salinas MD at [address] [place] [address]   • LASIK ENHANCEMENT - OD - RIGHT EYE Bilateral    • OTHER SURGICAL HISTORY  1- bleeding  PSYCH: denies depression or anxiety    Physical Exam:   05/26/20  0923   BP: 136/70   Pulse: 60   Resp: 19   SpO2: 98%   Weight: 241 lb (109.3 kg)   Height: 69\"     Body mass index is 35.59 kg/m².   Patient is alert, orientated, in no acute distr alcohol daily, and when does she denies drinking excessively    (R31.29) Microscopic hematuria  Comment: February 2019, July 2018. Family history bladder cancer  Plan: CT urogram and cystoscopy normal    (E55.9) Vitamin D deficiency  Plan:  To new vitamin

## 2020-05-26 NOTE — TELEPHONE ENCOUNTER
From: rAianna Bran  To: Ardyce Cranker, DPM  Sent: 5/26/2020 4:27 PM CDT  Subject: Non-Urgent Medical Question    Hi Dr. Bunny Burgess,    Now that things are loosening up I want to get back to fixing my left heel if at all possible.     When you first saw m

## 2020-05-27 ENCOUNTER — TELEPHONE (OUTPATIENT)
Dept: INTERNAL MEDICINE CLINIC | Facility: CLINIC | Age: 55
End: 2020-05-27

## 2020-05-27 NOTE — TELEPHONE ENCOUNTER
Called regarding lab results, that viral hepatitis profile and alpha 1 antitrypsin were normal.  Patient advised to follow-up with GI regarding hepatosplenomegaly; also improved although still elevated ALT.   Patient also advised to follow-up with Dr. Rupinder Ozuna

## 2020-05-28 NOTE — TELEPHONE ENCOUNTER
If the patient has not completed any physical therapy please have her call to schedule a physical therapy and get it going if new order is needed place you order for 8 visits 2 times weekly for diagnosis of calcific Achilles tendinitis left heel.

## 2020-06-09 ENCOUNTER — TELEPHONE (OUTPATIENT)
Dept: INTERNAL MEDICINE CLINIC | Facility: CLINIC | Age: 55
End: 2020-06-09

## 2020-06-09 NOTE — TELEPHONE ENCOUNTER
Patient wants to speak to Dr. Aniya Larkin, and when asked for the reason for her call, patient declined, patent states is (personal). Please advise.        Feels that she has an open sore in her vaginal area, similar to feeling of when she had herpes there in t

## 2020-06-10 RX ORDER — VALACYCLOVIR HYDROCHLORIDE 500 MG/1
0.5 TABLET, FILM COATED ORAL EVERY 8 HOURS
Qty: 21 TABLET | Refills: 0 | Status: SHIPPED | OUTPATIENT
Start: 2020-06-10 | End: 2020-06-17

## 2020-07-09 ENCOUNTER — PATIENT MESSAGE (OUTPATIENT)
Dept: INTERNAL MEDICINE CLINIC | Facility: CLINIC | Age: 55
End: 2020-07-09

## 2020-07-09 ENCOUNTER — HOSPITAL ENCOUNTER (OUTPATIENT)
Age: 55
Discharge: HOME | End: 2020-07-09
Payer: COMMERCIAL

## 2020-07-09 VITALS
TEMPERATURE: 98 F | DIASTOLIC BLOOD PRESSURE: 84 MMHG | RESPIRATION RATE: 16 BRPM | WEIGHT: 233 LBS | HEART RATE: 58 BPM | OXYGEN SATURATION: 97 % | BODY MASS INDEX: 34.51 KG/M2 | HEIGHT: 69 IN | SYSTOLIC BLOOD PRESSURE: 161 MMHG

## 2020-07-09 DIAGNOSIS — L03.019 ONYCHIA AND PARONYCHIA OF FINGER: Primary | ICD-10-CM

## 2020-07-09 PROCEDURE — 99203 OFFICE O/P NEW LOW 30 MIN: CPT | Performed by: PHYSICIAN ASSISTANT

## 2020-07-09 RX ORDER — CEPHALEXIN 500 MG/1
500 CAPSULE ORAL 3 TIMES DAILY
Qty: 30 CAPSULE | Refills: 0 | Status: SHIPPED | OUTPATIENT
Start: 2020-07-09 | End: 2020-07-19

## 2020-07-09 NOTE — ED INITIAL ASSESSMENT (HPI)
Had manicure on Sunday, and Tuesday pt notice pain/swelling/possible infection to right 2nd finger. Denies fevers.

## 2020-07-09 NOTE — TELEPHONE ENCOUNTER
Spoke to patient. No fevers, no pus/drainage. Area is warm to touch, red, and swollen.   Informed her Dr. Thony Live is on vacation, and even if she is in, this would require an in-office consultation -- especially if it is infection-related as it would requ

## 2020-07-09 NOTE — TELEPHONE ENCOUNTER
From: Love Links  To: Isabela Butler MD  Sent: 7/9/2020 11:42 AM CDT  Subject: Non-Urgent Medical Question    My right pointer finger has something festering in it.  It almost feels like a bad hangnail or sore like if i had a sliver and my body w

## 2020-07-10 NOTE — ED PROVIDER NOTES
Patient Seen in: [facility]      History   Patient presents with:  Cellulitis    Stated Complaint: right hand finger infection x Tuesday     HPI    Jacquelyn Mccloud is a 29-year-old female who presents today for evaluation of right ind knee torn meniscus repair   • OTHER SURGICAL HISTORY  08/23/2018    right cb    • OTHER SURGICAL HISTORY      lef t meniscus repair knee   • ROBOT-ASSISTED LAPAROSCOPIC RIGHT COLON RESECTION Right 8/23/2018    Performed by Perla Carson MD at [facility] informed of my diagnosis of paronychia. She is instructed to perform warm soaks. She will be discharged home on Keflex. Patient is penicillin allergic with development of hives, however no history of anaphylaxis.   Risk is worth the benefit of trying a

## 2020-11-02 ENCOUNTER — HOSPITAL ENCOUNTER (OUTPATIENT)
Age: 55
Discharge: HOME | End: 2020-11-02
Payer: COMMERCIAL

## 2020-11-02 VITALS
HEART RATE: 59 BPM | OXYGEN SATURATION: 97 % | HEIGHT: 69.02 IN | WEIGHT: 222 LBS | SYSTOLIC BLOOD PRESSURE: 150 MMHG | DIASTOLIC BLOOD PRESSURE: 78 MMHG | RESPIRATION RATE: 18 BRPM | BODY MASS INDEX: 32.88 KG/M2 | TEMPERATURE: 98 F

## 2020-11-02 DIAGNOSIS — R50.9 FEVER, UNSPECIFIED FEVER CAUSE: Primary | ICD-10-CM

## 2020-11-02 DIAGNOSIS — R43.0 ANOSMIA: ICD-10-CM

## 2020-11-02 DIAGNOSIS — R43.2 AGEUSIA: ICD-10-CM

## 2020-11-02 DIAGNOSIS — Z20.822 ENCOUNTER FOR LABORATORY TESTING FOR COVID-19 VIRUS: ICD-10-CM

## 2020-11-02 PROCEDURE — 99214 OFFICE O/P EST MOD 30 MIN: CPT | Performed by: PHYSICIAN ASSISTANT

## 2020-11-03 NOTE — ED PROVIDER NOTES
Patient Seen in: [address]      History   Patient presents with:  Fever  Other    Stated Complaint: loss of taste , loss of smell, fever x 1 day    HPI    Verito Sherman is a 53-year-old female who presents today for evaluation of fever, coug meniscus repair   • OTHER SURGICAL HISTORY  08/23/2018    right cb    • OTHER SURGICAL HISTORY      lef t meniscus repair knee   • ROBOT-ASSISTED LAPAROSCOPIC RIGHT COLON RESECTION Right 8/23/2018    Performed by Aj Kimble MD at [address] [place] [address] experiencing symptoms so a [facility] COVID test is performed today. Results are pending. Patient is advised of current self isolation/quarantine recommendations currently given by the CDC.         MDM    Patient is informed that test results will be availabl

## 2020-11-09 ENCOUNTER — TELEPHONE (OUTPATIENT)
Dept: CASE MANAGEMENT | Age: 55
End: 2020-11-09

## 2020-11-09 RX ORDER — LEVOTHYROXINE SODIUM 88 UG/1
TABLET ORAL
Qty: 90 TABLET | Refills: 0 | Status: SHIPPED | OUTPATIENT
Start: 2020-11-09 | End: 2021-05-03

## 2020-11-09 RX ORDER — LEVOTHYROXINE SODIUM 88 UG/1
88 TABLET ORAL
Qty: 30 TABLET | Refills: 0 | Status: SHIPPED | OUTPATIENT
Start: 2020-11-09 | End: 2020-11-09

## 2020-11-09 RX ORDER — LEVOTHYROXINE SODIUM 88 UG/1
88 TABLET ORAL
Qty: 90 TABLET | Refills: 0 | Status: CANCELLED | OUTPATIENT
Start: 2020-11-09

## 2020-11-09 NOTE — TELEPHONE ENCOUNTER
Reached patient to discuss switching from Brand Tirosint to either generic levothyroxine or alternate Brands (Levoxyl or Unithroid) per insurance formulary.      Patient states she was just recently started on the Tirosint and is willing to switch to the ge

## 2021-01-01 NOTE — TELEPHONE ENCOUNTER
----- Message from Olga Griffiths DPM sent at 3/10/2020 10:10 AM CDT -----  Results reviewed. Please inform patient that fungal culture results are negative and we should proceed with kerasol therapy.   Please tell the patient to purchase it at their p 2021

## 2021-04-02 ENCOUNTER — IMMUNIZATION (OUTPATIENT)
Dept: LAB | Age: 56
End: 2021-04-02
Attending: HOSPITALIST
Payer: COMMERCIAL

## 2021-04-02 DIAGNOSIS — Z23 NEED FOR VACCINATION: Primary | ICD-10-CM

## 2021-04-02 PROCEDURE — 0001A SARSCOV2 VAC 30MCG/0.3ML IM: CPT

## 2021-04-29 ENCOUNTER — IMMUNIZATION (OUTPATIENT)
Dept: LAB | Age: 56
End: 2021-04-29
Attending: HOSPITALIST
Payer: COMMERCIAL

## 2021-04-29 DIAGNOSIS — Z23 NEED FOR VACCINATION: Primary | ICD-10-CM

## 2021-04-29 PROCEDURE — 0002A SARSCOV2 VAC 30MCG/0.3ML IM: CPT

## 2021-04-30 ENCOUNTER — TELEPHONE (OUTPATIENT)
Dept: INTERNAL MEDICINE CLINIC | Facility: CLINIC | Age: 56
End: 2021-04-30

## 2021-04-30 NOTE — TELEPHONE ENCOUNTER
Patient is requesting a refill for the following medication:      LEVOTHYROXINE SODIUM 88 MCG Oral Tab

## 2021-05-03 RX ORDER — LEVOTHYROXINE SODIUM 88 UG/1
88 TABLET ORAL
Qty: 90 TABLET | Refills: 0 | Status: SHIPPED | OUTPATIENT
Start: 2021-05-03 | End: 2021-08-04

## 2021-05-06 ENCOUNTER — PATIENT MESSAGE (OUTPATIENT)
Dept: INTERNAL MEDICINE CLINIC | Facility: CLINIC | Age: 56
End: 2021-05-06

## 2021-05-06 DIAGNOSIS — L60.0 INGROWN TOENAIL: Primary | ICD-10-CM

## 2021-05-07 NOTE — TELEPHONE ENCOUNTER
Ada Warren 5/6/2021 4:30 PM CDT      ----- Message -----  From: Elner Brunner  Sent: 5/6/2021 2:56 PM CDT  To: Jose G Heath Clinical Staff  Subject: Referral Request     I have had a procedure on my right big toe for ingrown toenail.  I need to have the same
unable to perform/lethargic and behavior limitations impact participation

## 2021-05-11 ENCOUNTER — OFFICE VISIT (OUTPATIENT)
Dept: HEMATOLOGY/ONCOLOGY | Facility: HOSPITAL | Age: 56
End: 2021-05-11
Attending: INTERNAL MEDICINE
Payer: COMMERCIAL

## 2021-05-11 ENCOUNTER — PATIENT MESSAGE (OUTPATIENT)
Dept: INTERNAL MEDICINE CLINIC | Facility: CLINIC | Age: 56
End: 2021-05-11

## 2021-05-11 VITALS
TEMPERATURE: 98 F | WEIGHT: 223 LBS | OXYGEN SATURATION: 95 % | SYSTOLIC BLOOD PRESSURE: 161 MMHG | DIASTOLIC BLOOD PRESSURE: 77 MMHG | BODY MASS INDEX: 33.03 KG/M2 | RESPIRATION RATE: 16 BRPM | HEART RATE: 68 BPM | HEIGHT: 69.02 IN

## 2021-05-11 DIAGNOSIS — D37.3 LOW GRADE MUCINOUS NEOPLASM OF APPENDIX: ICD-10-CM

## 2021-05-11 DIAGNOSIS — Z08 ENCOUNTER FOR FOLLOW-UP SURVEILLANCE OF APPENDICEAL CANCER: Primary | ICD-10-CM

## 2021-05-11 DIAGNOSIS — C18.1 MUCINOUS ADENOCARCINOMA OF APPENDIX (HCC): Primary | ICD-10-CM

## 2021-05-11 DIAGNOSIS — Z85.038 ENCOUNTER FOR FOLLOW-UP SURVEILLANCE OF APPENDICEAL CANCER: Primary | ICD-10-CM

## 2021-05-11 PROCEDURE — 99214 OFFICE O/P EST MOD 30 MIN: CPT | Performed by: INTERNAL MEDICINE

## 2021-05-11 NOTE — PROGRESS NOTES
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 Last office visit was in October 2018. Patient reports she is due for a MRI of the abdomen. She is feeling well. Denies pain. Denies any GI complaints.  Appetite and energy level has been [employer]

## 2021-05-13 NOTE — PROGRESS NOTES
[facility]    PATIENT'S NAME: Tyrone Carey   ATTENDING PHYSICIAN: Maxwell Pagan M.D.    PATIENT ACCOUNT #: [de-identified] LOCATION: [address] [place] [address] RECORD #: [id number] YOB: 1965   DATE OF SERVICE: 05/11/2021       CANCER [facility] 0.  Her weight is 223 pounds. She is 5 feet 9 inches. Blood pressure is 161/77, pulse 68, respiratory rate 20, temperature 97. 6. HEENT:  Unremarkable. LYMPHATICS:  No adenopathy. LUNGS:  Clear.   HEART:  Normal.  ABDOMEN:  No hepatosplenomegaly or ten

## 2021-05-20 ENCOUNTER — HOSPITAL ENCOUNTER (OUTPATIENT)
Dept: MRI IMAGING | Facility: HOSPITAL | Age: 56
Discharge: HOME | End: 2021-05-20
Attending: INTERNAL MEDICINE
Payer: COMMERCIAL

## 2021-05-20 DIAGNOSIS — C18.1 MUCINOUS ADENOCARCINOMA OF APPENDIX (HCC): ICD-10-CM

## 2021-05-20 PROCEDURE — A9575 INJ GADOTERATE MEGLUMI 0.1ML: HCPCS | Performed by: INTERNAL MEDICINE

## 2021-05-20 PROCEDURE — 72197 MRI PELVIS W/O & W/DYE: CPT | Performed by: INTERNAL MEDICINE

## 2021-05-20 PROCEDURE — 74183 MRI ABD W/O CNTR FLWD CNTR: CPT | Performed by: INTERNAL MEDICINE

## 2021-06-03 ENCOUNTER — HOSPITAL ENCOUNTER (OUTPATIENT)
Dept: MAMMOGRAPHY | Facility: HOSPITAL | Age: 56
Discharge: HOME | End: 2021-06-03
Attending: INTERNAL MEDICINE
Payer: COMMERCIAL

## 2021-06-03 DIAGNOSIS — Z12.31 SCREENING MAMMOGRAM, ENCOUNTER FOR: ICD-10-CM

## 2021-06-03 PROCEDURE — 77063 BREAST TOMOSYNTHESIS BI: CPT | Performed by: INTERNAL MEDICINE

## 2021-06-03 PROCEDURE — 77067 SCR MAMMO BI INCL CAD: CPT | Performed by: INTERNAL MEDICINE

## 2021-06-07 ENCOUNTER — OFFICE VISIT (OUTPATIENT)
Dept: INTERNAL MEDICINE CLINIC | Facility: CLINIC | Age: 56
End: 2021-06-07

## 2021-06-07 VITALS
OXYGEN SATURATION: 99 % | HEART RATE: 72 BPM | HEIGHT: 69 IN | WEIGHT: 221 LBS | SYSTOLIC BLOOD PRESSURE: 142 MMHG | DIASTOLIC BLOOD PRESSURE: 88 MMHG | BODY MASS INDEX: 32.73 KG/M2

## 2021-06-07 DIAGNOSIS — K76.89 LIVER CYST: ICD-10-CM

## 2021-06-07 DIAGNOSIS — K76.0 HEPATIC STEATOSIS: ICD-10-CM

## 2021-06-07 DIAGNOSIS — Z00.00 ANNUAL PHYSICAL EXAM: Primary | ICD-10-CM

## 2021-06-07 DIAGNOSIS — E55.9 VITAMIN D DEFICIENCY: ICD-10-CM

## 2021-06-07 DIAGNOSIS — I10 ESSENTIAL HYPERTENSION: ICD-10-CM

## 2021-06-07 DIAGNOSIS — R31.29 MICROSCOPIC HEMATURIA: ICD-10-CM

## 2021-06-07 DIAGNOSIS — R74.8 ELEVATED LIVER ENZYMES: ICD-10-CM

## 2021-06-07 DIAGNOSIS — D37.3 LOW GRADE MUCINOUS NEOPLASM OF APPENDIX: ICD-10-CM

## 2021-06-07 PROCEDURE — 99396 PREV VISIT EST AGE 40-64: CPT | Performed by: INTERNAL MEDICINE

## 2021-06-07 PROCEDURE — 3008F BODY MASS INDEX DOCD: CPT | Performed by: INTERNAL MEDICINE

## 2021-06-07 PROCEDURE — 3077F SYST BP >= 140 MM HG: CPT | Performed by: INTERNAL MEDICINE

## 2021-06-07 PROCEDURE — 3079F DIAST BP 80-89 MM HG: CPT | Performed by: INTERNAL MEDICINE

## 2021-06-07 RX ORDER — LOSARTAN POTASSIUM 50 MG/1
50 TABLET ORAL DAILY
Qty: 30 TABLET | Refills: 2 | Status: SHIPPED | OUTPATIENT
Start: 2021-06-07 | End: 2021-09-05

## 2021-06-07 RX ORDER — LOSARTAN POTASSIUM 50 MG/1
50 TABLET ORAL DAILY
Qty: 90 TABLET | Refills: 1 | Status: SHIPPED | OUTPATIENT
Start: 2021-06-07 | End: 2021-06-07

## 2021-06-07 NOTE — PROGRESS NOTES
Aliya Tang is a 49year old female.     Chief complaint: annual physical exam       HPI:     Aliya Tang is a 49year old pleasant female who presents for annual physical exam       Hypothyroidism   On levothyroxine       No chest pain no sob no abdo Rt knee torn meniscus repair   • OTHER SURGICAL HISTORY Left 3/13/2015    Procedure: KNEE ARTHROSCOPY;  Surgeon: Juanna Leyden, MD;  Location: [address] [place] [address] MAIN OR   • OTHER SURGICAL HISTORY  08/23/2018    right cb    • OTHER SURGICAL HISTORY      jaycob calixto encounter. MRI ABDOMEN+PELVIS (ALL W+WO) (CPT=74183/86337)    Result Date: 5/20/2021  CONCLUSION:  1. There is no evidence of peritoneal metastatic disease in the abdomen or pelvis. 2. There is splenomegaly.  3. There is evidence of previous right hemico get shingles vaccine   Discussed pneumonia shot would like to hold off     - CBC WITH DIFFERENTIAL WITH PLATELET; Future  - COMP METABOLIC PANEL (14); Future  - LIPID PANEL;  Future  - TSH W REFLEX TO FREE T4; Future  - VITAMIN D, 25-HYDROXY; Future  - URIN CULTURE REFLEX; Future  - losartan Potassium 50 MG Oral Tab; Take 1 tablet (50 mg total) by mouth daily. Dispense: 30 tablet; Refill: 2    6. Liver cyst  cmp  - CBC WITH DIFFERENTIAL WITH PLATELET; Future  - COMP METABOLIC PANEL (14);  Future  - LIPID PANE

## 2021-06-07 NOTE — PATIENT INSTRUCTIONS
Controlling High Blood Pressure   High blood pressure (hypertension) is often called the silent killer. This is because many people who have it, don’t know it. It can be very dangerous.  High blood pressure can raise your risk of heart attack, stroke, hea that your meal be prepared with no added salt. Stay at a healthy weight   · Ask your healthcare provider how many calories to eat a day. Then stick to that number. · Ask your healthcare provider what weight range is healthiest for you.  If you are overw information is not intended as a substitute for professional medical care. Always follow your healthcare professional's instructions.

## 2021-06-11 ENCOUNTER — LAB ENCOUNTER (OUTPATIENT)
Dept: LAB | Facility: HOSPITAL | Age: 56
End: 2021-06-11
Attending: INTERNAL MEDICINE
Payer: COMMERCIAL

## 2021-06-11 DIAGNOSIS — R74.8 ELEVATED LIVER ENZYMES: ICD-10-CM

## 2021-06-11 DIAGNOSIS — K76.89 LIVER CYST: ICD-10-CM

## 2021-06-11 DIAGNOSIS — I10 ESSENTIAL HYPERTENSION: ICD-10-CM

## 2021-06-11 DIAGNOSIS — R31.29 MICROSCOPIC HEMATURIA: ICD-10-CM

## 2021-06-11 DIAGNOSIS — D37.3 LOW GRADE MUCINOUS NEOPLASM OF APPENDIX: ICD-10-CM

## 2021-06-11 DIAGNOSIS — K76.0 HEPATIC STEATOSIS: ICD-10-CM

## 2021-06-11 DIAGNOSIS — E55.9 VITAMIN D DEFICIENCY: ICD-10-CM

## 2021-06-11 DIAGNOSIS — Z00.00 ANNUAL PHYSICAL EXAM: ICD-10-CM

## 2021-06-11 PROCEDURE — 36415 COLL VENOUS BLD VENIPUNCTURE: CPT

## 2021-06-11 PROCEDURE — 82306 VITAMIN D 25 HYDROXY: CPT

## 2021-06-11 PROCEDURE — 84443 ASSAY THYROID STIM HORMONE: CPT

## 2021-06-11 PROCEDURE — 81001 URINALYSIS AUTO W/SCOPE: CPT

## 2021-06-11 PROCEDURE — 80061 LIPID PANEL: CPT

## 2021-06-11 PROCEDURE — 80053 COMPREHEN METABOLIC PANEL: CPT

## 2021-06-11 PROCEDURE — 85025 COMPLETE CBC W/AUTO DIFF WBC: CPT

## 2021-07-02 ENCOUNTER — OFFICE VISIT (OUTPATIENT)
Dept: PODIATRY CLINIC | Facility: CLINIC | Age: 56
End: 2021-07-02

## 2021-07-02 DIAGNOSIS — M79.675 PAIN OF TOE OF LEFT FOOT: ICD-10-CM

## 2021-07-02 DIAGNOSIS — L60.0 ONYCHOCRYPTOSIS: Primary | ICD-10-CM

## 2021-07-02 PROCEDURE — 99213 OFFICE O/P EST LOW 20 MIN: CPT | Performed by: PODIATRIST

## 2021-07-02 NOTE — PROGRESS NOTES
Cinda Cameron is a 49year old female. Patient presents with:  Ingrown Toenail: pt is here for ingrown toe nail of left foot, rates pain 7/10.         HPI:   This pleasant patient returns to the clinic with a chief complaint of having a painful ingrown toe Shereen Acevedo MD;  Location: [address] [place] [address] Rd - RIGHT EYE Bilateral    • OTHER SURGICAL HISTORY  1-10-11    dr Viki moe Diss   • OTHER SURGICAL HISTORY  11/2011    Rt knee torn meniscus repair   • OTHER SURGICA exertion  CARDIOVASCULAR: denies chest pain on exertion  GI: denies abdominal pain and denies heartburn  NEURO: denies headaches    EXAM:   There were no vitals taken for this visit.   GENERAL: well developed, well nourished, in no apparent distress  EXTREM

## 2021-07-12 ENCOUNTER — OFFICE VISIT (OUTPATIENT)
Dept: PODIATRY CLINIC | Facility: CLINIC | Age: 56
End: 2021-07-12

## 2021-07-12 ENCOUNTER — OFFICE VISIT (OUTPATIENT)
Dept: INTERNAL MEDICINE CLINIC | Facility: CLINIC | Age: 56
End: 2021-07-12

## 2021-07-12 VITALS
BODY MASS INDEX: 32.35 KG/M2 | HEIGHT: 69 IN | SYSTOLIC BLOOD PRESSURE: 120 MMHG | WEIGHT: 218.38 LBS | HEART RATE: 52 BPM | OXYGEN SATURATION: 99 % | DIASTOLIC BLOOD PRESSURE: 72 MMHG

## 2021-07-12 VITALS — DIASTOLIC BLOOD PRESSURE: 66 MMHG | SYSTOLIC BLOOD PRESSURE: 138 MMHG

## 2021-07-12 DIAGNOSIS — L60.0 ONYCHOCRYPTOSIS: Primary | ICD-10-CM

## 2021-07-12 DIAGNOSIS — I10 ESSENTIAL HYPERTENSION: Primary | ICD-10-CM

## 2021-07-12 DIAGNOSIS — E78.2 MIXED HYPERLIPIDEMIA: ICD-10-CM

## 2021-07-12 DIAGNOSIS — E66.9 CLASS 1 OBESITY WITH SERIOUS COMORBIDITY AND BODY MASS INDEX (BMI) OF 32.0 TO 32.9 IN ADULT, UNSPECIFIED OBESITY TYPE: ICD-10-CM

## 2021-07-12 DIAGNOSIS — M79.675 PAIN OF TOE OF LEFT FOOT: ICD-10-CM

## 2021-07-12 PROCEDURE — 3074F SYST BP LT 130 MM HG: CPT | Performed by: INTERNAL MEDICINE

## 2021-07-12 PROCEDURE — 3078F DIAST BP <80 MM HG: CPT | Performed by: INTERNAL MEDICINE

## 2021-07-12 PROCEDURE — 99215 OFFICE O/P EST HI 40 MIN: CPT | Performed by: INTERNAL MEDICINE

## 2021-07-12 PROCEDURE — 11750 EXCISION NAIL&NAIL MATRIX: CPT | Performed by: PODIATRIST

## 2021-07-12 PROCEDURE — 3075F SYST BP GE 130 - 139MM HG: CPT | Performed by: PODIATRIST

## 2021-07-12 PROCEDURE — 3078F DIAST BP <80 MM HG: CPT | Performed by: PODIATRIST

## 2021-07-12 PROCEDURE — 3008F BODY MASS INDEX DOCD: CPT | Performed by: INTERNAL MEDICINE

## 2021-07-12 RX ORDER — SEMAGLUTIDE 0.25 MG/.5ML
0.25 INJECTION, SOLUTION SUBCUTANEOUS WEEKLY
Qty: 4 EACH | Refills: 0 | Status: SHIPPED | OUTPATIENT
Start: 2021-07-12

## 2021-07-12 RX ORDER — AZITHROMYCIN 250 MG/1
TABLET, FILM COATED ORAL
Qty: 6 TABLET | Refills: 0 | Status: SHIPPED | OUTPATIENT
Start: 2021-07-12

## 2021-07-12 NOTE — PROGRESS NOTES
Cinda Cameron is a 49year old female. Patient presents with:  Ingrown Toenail: Left hallux, medial border. Patient states that pain comes and goes, no pain in the office today. Patient denies any fever, chills, or discharge from the toe nail.         HPI: [address] [place] [address]   • LASIK ENHANCEMENT - OD - RIGHT EYE Bilateral    • OTHER SURGICAL HISTORY  1-10-11    dr Edd moe   • OTHER SURGICAL HISTORY  11/2011    Rt knee torn meniscus repair   • OTHER SURGICAL HISTORY Left 3/13/2015    Procedu pain on exertion  GI: denies abdominal pain and denies heartburn  NEURO: denies headaches    EXAM:   /66 (BP Location: Left arm, Patient Position: Sitting, Cuff Size: adult)   GENERAL: well developed, well nourished, in no apparent distress  EXTREMIT Sarah Linda DPM

## 2021-07-12 NOTE — PATIENT INSTRUCTIONS
CARL Leóncome to [employer]. We are excited that you are committed to improving your health and have invited our practice to be part of your journey.  Our approach to the medical management of weight loss is similar to that of other chr water per day, add fiber ( benefiber) to the water to increase fullness, overcome constipation    · Eat slowly    · Do not drink your calories ( no regular pop, juice, high calorie coffee drinks, limit alcohol) Also stay away from artificially sweetened be

## 2021-07-12 NOTE — PROGRESS NOTES
Verbal order given by Dr Sara Guajardo to draw up 5mls of ropivacaine 0.5% for permanent removal of medial border of left hallux nail with chemical destruction of the nail root.

## 2021-07-12 NOTE — PROGRESS NOTES
Katty De Los Santos is a 49year old female. Chief complaint:  weight loss management and obesity related comorbidities, HTN , HL     HPI:     Katty De Los Santos is a 49year old female new to our office today.   with PMH as listed below here for weight management s      HL   Trying to loose weight and watching diet   Started crestor         Current Outpatient Medications   Medication Sig Dispense Refill   • mupirocin 2 % External Ointment Apply a small amount to the affected nail edge twice daily after soaking and Rt knee torn meniscus repair   • OTHER SURGICAL HISTORY Left 3/13/2015    Procedure: KNEE ARTHROSCOPY;  Surgeon: Gloria Hernandez MD;  Location: [address] [place] [address]   • OTHER SURGICAL HISTORY  08/23/2018    robotic right hemicolectomy   • OTHER SURGICAL HISTORY type  Plan: Semaglutide (WEGOVY 0.25MG/0.5ML) Subcutaneous         Solution Auto-injector    Reviewed  Readiness for Lifestyle change: 10 /10, Interest in Medication: 10/10, Surgery interest: 0/10.     Counseled on comprehensive weight loss plan including a worsening symptoms   Governor MD Gordon,   Diplomate of the [employer] of Internal Medicine  Diplomate of the American Board of Obesity Medicine

## 2021-08-04 RX ORDER — LEVOTHYROXINE SODIUM 88 UG/1
TABLET ORAL
Qty: 90 TABLET | Refills: 0 | Status: SHIPPED | OUTPATIENT
Start: 2021-08-04 | End: 2021-12-14

## 2021-08-24 ENCOUNTER — TELEPHONE (OUTPATIENT)
Dept: INTERNAL MEDICINE CLINIC | Facility: CLINIC | Age: 56
End: 2021-08-24

## 2021-08-24 NOTE — TELEPHONE ENCOUNTER
Pharmacy called for a refill for [facility]PRANEETH GARCÍA. 0.5 MG. Patient was on .25, and time for an increase.

## 2021-09-14 RX ORDER — ROSUVASTATIN CALCIUM 10 MG/1
TABLET, COATED ORAL
Qty: 90 TABLET | Refills: 0 | Status: SHIPPED | OUTPATIENT
Start: 2021-09-14

## 2021-12-14 RX ORDER — LEVOTHYROXINE SODIUM 88 UG/1
TABLET ORAL
Qty: 90 TABLET | Refills: 0 | Status: SHIPPED | OUTPATIENT
Start: 2021-12-14

## 2022-05-13 RX ORDER — ROSUVASTATIN CALCIUM 10 MG/1
TABLET, COATED ORAL
Qty: 90 TABLET | Refills: 0 | Status: SHIPPED | OUTPATIENT
Start: 2022-05-13

## 2022-06-23 RX ORDER — LEVOTHYROXINE SODIUM 88 UG/1
TABLET ORAL
Qty: 90 TABLET | Refills: 0 | Status: SHIPPED | OUTPATIENT
Start: 2022-06-23

## 2022-06-24 ENCOUNTER — PATIENT MESSAGE (OUTPATIENT)
Dept: INTERNAL MEDICINE CLINIC | Facility: CLINIC | Age: 57
End: 2022-06-24

## 2022-06-24 DIAGNOSIS — Z12.31 ENCOUNTER FOR SCREENING MAMMOGRAM FOR MALIGNANT NEOPLASM OF BREAST: Primary | ICD-10-CM

## 2022-06-28 NOTE — TELEPHONE ENCOUNTER
Alka Rebolledo, [facility] 6/25/2022 8:03 AM CDT    Please enter mammogram  ----- Message -----  From: Raina Bowling  Sent: 6/24/2022 4:01 PM CDT  To: Jose G Heath Clinical Staff  Subject: Time for mammogram     I am in the area next week, if I am supposed to have another Mammogram now would be the time. Thank you!!     Dallas Baxter

## 2022-06-29 DIAGNOSIS — D37.3 LOW GRADE MUCINOUS NEOPLASM OF APPENDIX: Primary | ICD-10-CM

## 2022-12-22 DIAGNOSIS — Z00.00 WELLNESS EXAMINATION: Primary | ICD-10-CM

## 2022-12-22 DIAGNOSIS — E78.2 MIXED HYPERLIPIDEMIA: ICD-10-CM

## 2023-01-11 ENCOUNTER — LAB ENCOUNTER (OUTPATIENT)
Dept: LAB | Age: 58
End: 2023-01-11
Attending: INTERNAL MEDICINE
Payer: COMMERCIAL

## 2023-01-11 DIAGNOSIS — E78.2 MIXED HYPERLIPIDEMIA: ICD-10-CM

## 2023-01-11 DIAGNOSIS — Z00.00 WELLNESS EXAMINATION: ICD-10-CM

## 2023-01-11 LAB
ALBUMIN SERPL-MCNC: 4 G/DL (ref 3.4–5)
ALBUMIN/GLOB SERPL: 1.3 {RATIO} (ref 1–2)
ALP LIVER SERPL-CCNC: 63 U/L
ALT SERPL-CCNC: 67 U/L
ANION GAP SERPL CALC-SCNC: 7 MMOL/L (ref 0–18)
AST SERPL-CCNC: 31 U/L (ref 15–37)
BASOPHILS # BLD AUTO: 0.04 X10(3) UL (ref 0–0.2)
BASOPHILS NFR BLD AUTO: 0.8 %
BILIRUB SERPL-MCNC: 0.8 MG/DL (ref 0.1–2)
BUN BLD-MCNC: 14 MG/DL (ref 7–18)
CALCIUM BLD-MCNC: 9.4 MG/DL (ref 8.5–10.1)
CHLORIDE SERPL-SCNC: 108 MMOL/L (ref 98–112)
CHOLEST SERPL-MCNC: 258 MG/DL (ref ?–200)
CO2 SERPL-SCNC: 28 MMOL/L (ref 21–32)
CREAT BLD-MCNC: 0.92 MG/DL
EOSINOPHIL # BLD AUTO: 0.11 X10(3) UL (ref 0–0.7)
EOSINOPHIL NFR BLD AUTO: 2.3 %
ERYTHROCYTE [DISTWIDTH] IN BLOOD BY AUTOMATED COUNT: 12.8 %
EST. AVERAGE GLUCOSE BLD GHB EST-MCNC: 94 MG/DL (ref 68–126)
FASTING PATIENT LIPID ANSWER: YES
FASTING STATUS PATIENT QL REPORTED: YES
GFR SERPLBLD BASED ON 1.73 SQ M-ARVRAT: 74 ML/MIN/1.73M2 (ref 60–?)
GLOBULIN PLAS-MCNC: 3.2 G/DL (ref 2.8–4.4)
GLUCOSE BLD-MCNC: 106 MG/DL (ref 70–99)
HBA1C MFR BLD: 4.9 % (ref ?–5.7)
HCT VFR BLD AUTO: 45.6 %
HDLC SERPL-MCNC: 43 MG/DL (ref 40–59)
HGB BLD-MCNC: 15.5 G/DL
IMM GRANULOCYTES # BLD AUTO: 0.01 X10(3) UL (ref 0–1)
IMM GRANULOCYTES NFR BLD: 0.2 %
LDLC SERPL CALC-MCNC: 162 MG/DL (ref ?–100)
LYMPHOCYTES # BLD AUTO: 1.83 X10(3) UL (ref 1–4)
LYMPHOCYTES NFR BLD AUTO: 38.7 %
MCH RBC QN AUTO: 30.1 PG (ref 26–34)
MCHC RBC AUTO-ENTMCNC: 34 G/DL (ref 31–37)
MCV RBC AUTO: 88.5 FL
MONOCYTES # BLD AUTO: 0.42 X10(3) UL (ref 0.1–1)
MONOCYTES NFR BLD AUTO: 8.9 %
NEUTROPHILS # BLD AUTO: 2.32 X10 (3) UL (ref 1.5–7.7)
NEUTROPHILS # BLD AUTO: 2.32 X10(3) UL (ref 1.5–7.7)
NEUTROPHILS NFR BLD AUTO: 49.1 %
NONHDLC SERPL-MCNC: 215 MG/DL (ref ?–130)
OSMOLALITY SERPL CALC.SUM OF ELEC: 297 MOSM/KG (ref 275–295)
PLATELET # BLD AUTO: 179 10(3)UL (ref 150–450)
POTASSIUM SERPL-SCNC: 4.5 MMOL/L (ref 3.5–5.1)
PROT SERPL-MCNC: 7.2 G/DL (ref 6.4–8.2)
RBC # BLD AUTO: 5.15 X10(6)UL
SODIUM SERPL-SCNC: 143 MMOL/L (ref 136–145)
T4 FREE SERPL-MCNC: 0.9 NG/DL (ref 0.8–1.7)
TRIGL SERPL-MCNC: 283 MG/DL (ref 30–149)
TSI SER-ACNC: 3.71 MIU/ML (ref 0.36–3.74)
VLDLC SERPL CALC-MCNC: 57 MG/DL (ref 0–30)
WBC # BLD AUTO: 4.7 X10(3) UL (ref 4–11)

## 2023-01-11 PROCEDURE — 80061 LIPID PANEL: CPT

## 2023-01-11 PROCEDURE — 85025 COMPLETE CBC W/AUTO DIFF WBC: CPT

## 2023-01-11 PROCEDURE — 83036 HEMOGLOBIN GLYCOSYLATED A1C: CPT

## 2023-01-11 PROCEDURE — 36415 COLL VENOUS BLD VENIPUNCTURE: CPT

## 2023-01-11 PROCEDURE — 84443 ASSAY THYROID STIM HORMONE: CPT

## 2023-01-11 PROCEDURE — 80053 COMPREHEN METABOLIC PANEL: CPT

## 2023-01-11 PROCEDURE — 84439 ASSAY OF FREE THYROXINE: CPT

## 2023-01-13 RX ORDER — ROSUVASTATIN CALCIUM 20 MG/1
20 TABLET, COATED ORAL NIGHTLY
Qty: 90 TABLET | Refills: 0 | Status: SHIPPED | OUTPATIENT
Start: 2023-01-13 | End: 2023-04-13

## 2024-06-26 ENCOUNTER — PATIENT MESSAGE (OUTPATIENT)
Dept: INTERNAL MEDICINE CLINIC | Facility: CLINIC | Age: 59
End: 2024-06-26

## 2024-06-26 ENCOUNTER — OFFICE VISIT (OUTPATIENT)
Dept: INTERNAL MEDICINE CLINIC | Facility: CLINIC | Age: 59
End: 2024-06-26

## 2024-06-26 VITALS
HEIGHT: 69 IN | OXYGEN SATURATION: 100 % | DIASTOLIC BLOOD PRESSURE: 78 MMHG | BODY MASS INDEX: 34.69 KG/M2 | HEART RATE: 89 BPM | SYSTOLIC BLOOD PRESSURE: 140 MMHG | WEIGHT: 234.19 LBS

## 2024-06-26 DIAGNOSIS — I10 ESSENTIAL HYPERTENSION: ICD-10-CM

## 2024-06-26 DIAGNOSIS — K76.0 HEPATIC STEATOSIS: ICD-10-CM

## 2024-06-26 DIAGNOSIS — E78.2 MIXED HYPERLIPIDEMIA: ICD-10-CM

## 2024-06-26 DIAGNOSIS — Z00.00 ANNUAL PHYSICAL EXAM: Primary | ICD-10-CM

## 2024-06-26 DIAGNOSIS — Z12.83 SCREENING FOR SKIN CANCER: Primary | ICD-10-CM

## 2024-06-26 DIAGNOSIS — E55.9 VITAMIN D DEFICIENCY: ICD-10-CM

## 2024-06-26 DIAGNOSIS — R31.29 MICROSCOPIC HEMATURIA: ICD-10-CM

## 2024-06-26 DIAGNOSIS — Z12.31 ENCOUNTER FOR SCREENING MAMMOGRAM FOR MALIGNANT NEOPLASM OF BREAST: ICD-10-CM

## 2024-06-26 DIAGNOSIS — D37.3 LOW GRADE MUCINOUS NEOPLASM OF APPENDIX: ICD-10-CM

## 2024-06-26 PROCEDURE — 3077F SYST BP >= 140 MM HG: CPT | Performed by: INTERNAL MEDICINE

## 2024-06-26 PROCEDURE — 3008F BODY MASS INDEX DOCD: CPT | Performed by: INTERNAL MEDICINE

## 2024-06-26 PROCEDURE — 99396 PREV VISIT EST AGE 40-64: CPT | Performed by: INTERNAL MEDICINE

## 2024-06-26 PROCEDURE — 88175 CYTOPATH C/V AUTO FLUID REDO: CPT | Performed by: INTERNAL MEDICINE

## 2024-06-26 PROCEDURE — 3078F DIAST BP <80 MM HG: CPT | Performed by: INTERNAL MEDICINE

## 2024-06-26 RX ORDER — LOSARTAN POTASSIUM 50 MG/1
50 TABLET ORAL DAILY
Qty: 90 TABLET | Refills: 3 | Status: SHIPPED | OUTPATIENT
Start: 2024-06-26 | End: 2024-09-24

## 2024-06-26 NOTE — PROGRESS NOTES
Olimpia Medina is a 59 year old female.    Chief complaint: annual physical exam       HPI:     Olimpia Medina is a 59 year old pleasant female who presents for annual physical exam     No chest pain no sob no abdominal pain  No diarrhea or constipation   No fever or chills   No urinary complaints          Obeisty   Would like to restart wegovy         No smoking never been a smoker   Alcohol : occasional   Exercise : no   Does walk the dog in am   Family history of cancer: dad : bladder cancer   Grandfather   Moms side heart disease         No current outpatient medications on file.      Past Medical History:    Arrhythmia    Hx of irregular heartbeat, cardiac workup completed 2016, negative per cardiologist Dr. Joey Orantes, no treatment    Back problem    Neck pain, into left hand with numbness and tingling    DEPRESSION    HERPES SIMPLEX    shingles on right upper arm, genital, not oral    HYPERLIPIDEMIA    Low grade mucinous neoplasm of appendix    Migraines    Blurred Vision r/t headache    Migraines    NEUROCARDIOGENIC DISEASE    Other and unspecified hyperlipidemia    Thyroid disease     Past Surgical History:   Procedure Laterality Date    Appendectomy  07/10/2018    Laparoscopic Cecectomy, Drainage of Appendiceal abcess    Appendectomy      Colonoscopy N/A 8/13/2018    Procedure: COLONOSCOPY, POSSIBLE BIOPSY, POSSIBLE POLYPECTOMY 99391;  Surgeon: Guillermo Meyer MD;  Location: [facility]    Colonoscopy N/A 6/28/2021    Procedure: COLONOSCOPY;  Surgeon: Guillermo Meyer MD;  Location: [facility]    Lasik enhancement - od - right eye Bilateral     Other surgical history  1-10-11    kade lockhart, dr alvarez    Other surgical history  11/2011    Rt knee torn meniscus repair    Other surgical history Left 3/13/2015    Procedure: KNEE ARTHROSCOPY;  Surgeon: Eduardo Hidalgo MD;  Location:  MAIN OR    Other surgical history  08/23/2018    robotic right hemicolectomy     Other surgical history      lef t meniscus repair knee             Family History   Problem Relation Age of Onset    Hypertension Mother     Lipids Mother     Heart Disorder Mother         heavy smoker    Other (PVD) Mother     Diabetes Maternal Grandmother     Hypertension Maternal Grandmother     Lipids Maternal Grandmother     Obesity Maternal Grandmother     Heart Disorder Maternal Grandmother         MI  60's    Cancer Paternal Grandmother         colon cancer 50's, 92    Cancer Paternal Grandfather         bladder ca 60's    Cancer Father         Bladder cancer,  late 60'd    Hypertension Brother     Ovarian Cancer Paternal Cousin Female         under 50     Patient Active Problem List   Diagnosis    Low grade mucinous neoplasm of appendix    Preoperative testing    Lipoma of left upper extremity    Bronchiectasis with acute lower respiratory infection (HCC)    Hepatic steatosis    Microscopic hematuria    Obesity (BMI 30-39.9)    Mixed hyperlipidemia    Mass of left forearm    Essential hypertension    Liver cyst    Vitamin D deficiency       REVIEW OF SYSTEMS:   A comprehensive 10 point review of systems was completed.  Pertinent positives and negatives noted in the the HPI            EXAM:   /78   Pulse 89   Ht 5' 9\" (1.753 m)   Wt 234 lb 3.2 oz (106.2 kg)   SpO2 100%   BMI 34.59 kg/m²   GENERAL: well developed, well nourished,in no apparent distress  SKIN: no rashes,no suspicious lesions  HEENT: atraumatic, normocephalic,ears and throat are clear  NECK: supple,no adenopathy  Breast : normal no lumps   LUNGS: clear to auscultation  CARDIO: RRR without murmur  GI: no masses, HSM or tenderness  EXTREMITIES: no cyanosis, clubbing or edema  Pelvic exam : pap done   Normal bimanual exam   Weak pelvic floor muscles   NEURO: no gross deficits              No orders of the defined types were placed in this encounter.    No results found.         ASSESSMENT AND PLAN:       ICD-10-CM    1.  Annual physical exam  Z00.00 Surgery Referral - In Network     Oncology/Hematology Referral - In Network     MRI ABDOMEN+PELVIS (ALL W+WO) (CPT=74183/71332)     CT CALCIUM SCORING     ThinPrep PAP with HPV Reflex Request [E]     CBC With Differential With Platelet     Comp Metabolic Panel (14)     Lipid Panel     Hemoglobin A1C     TSH W Reflex To Free T4     Vitamin D     Urinalysis with Culture Reflex [E]     ThinPrep PAP with HPV Reflex Request [E]      2. Low grade mucinous neoplasm of appendix  D37.3 Surgery Referral - In [facility]     Oncology/Hematology Referral - In Network     MRI ABDOMEN+PELVIS (ALL W+WO) (CPT=74183/52408)     CT CALCIUM SCORING     ThinPrep PAP with HPV Reflex Request [E]     CBC With Differential With Platelet     Comp Metabolic Panel (14)     Lipid Panel     Hemoglobin A1C     TSH W Reflex To Free T4     Vitamin D     Urinalysis with Culture Reflex [E]     ThinPrep PAP with HPV Reflex Request [E]      3. Encounter for screening mammogram for malignant neoplasm of breast  Z12.31 [facility] GIUSEPPE 2D+3D SCREENING BILAT (CPT=77067/34218)     MRI ABDOMEN+PELVIS (ALL W+WO) (CPT=74183/20476)     CT CALCIUM SCORING     ThinPrep PAP with HPV Reflex Request [E]     CBC With Differential With Platelet     Comp Metabolic Panel (14)     Lipid Panel     Hemoglobin A1C     TSH W Reflex To Free T4     Vitamin D     Urinalysis with Culture Reflex [E]     ThinPrep PAP with HPV Reflex Request [E]      4. Vitamin D deficiency  E55.9       5. Mixed hyperlipidemia  E78.2       6. Essential hypertension  I10       7. Hepatic steatosis  K76.0       8. Microscopic hematuria  R31.29          Diet and exercise   Self breast exam   Sun screen recommended   Fasting blood work   Advised to get shingles vaccine she would like to hold off   Mammogram screening   Follow up with the surgeon for her colonoscopy   Pap today   Starting wegovy   Recommend CT calicum score   UA   Referral to oncology   Advised to restart BP medication        Please return to the clinic if you are having persistent symptoms. If worsening symptoms should go to the ER    Tori Samuels MD,   Diplomate of the American Board of Internal Medicine  Diplomate of the American Board of Obesity Medicine

## 2024-06-27 ENCOUNTER — PATIENT MESSAGE (OUTPATIENT)
Dept: INTERNAL MEDICINE CLINIC | Facility: CLINIC | Age: 59
End: 2024-06-27

## 2024-06-27 DIAGNOSIS — R74.8 ELEVATED LIVER ENZYMES: Primary | ICD-10-CM

## 2024-06-28 ENCOUNTER — LAB ENCOUNTER (OUTPATIENT)
Dept: LAB | Facility: HOSPITAL | Age: 59
End: 2024-06-28
Attending: INTERNAL MEDICINE
Payer: COMMERCIAL

## 2024-06-28 DIAGNOSIS — Z12.31 ENCOUNTER FOR SCREENING MAMMOGRAM FOR MALIGNANT NEOPLASM OF BREAST: ICD-10-CM

## 2024-06-28 DIAGNOSIS — D37.3 LOW GRADE MUCINOUS NEOPLASM OF APPENDIX: ICD-10-CM

## 2024-06-28 DIAGNOSIS — Z00.00 ANNUAL PHYSICAL EXAM: ICD-10-CM

## 2024-06-28 LAB
ALBUMIN SERPL-MCNC: 4.7 G/DL (ref 3.2–4.8)
ALBUMIN/GLOB SERPL: 1.7 {RATIO} (ref 1–2)
ALP LIVER SERPL-CCNC: 66 U/L
ALT SERPL-CCNC: 59 U/L
ANION GAP SERPL CALC-SCNC: 8 MMOL/L (ref 0–18)
AST SERPL-CCNC: 32 U/L (ref ?–34)
BASOPHILS # BLD AUTO: 0.04 X10(3) UL (ref 0–0.2)
BASOPHILS NFR BLD AUTO: 0.8 %
BILIRUB SERPL-MCNC: 1.2 MG/DL (ref 0.3–1.2)
BILIRUB UR QL: NEGATIVE
BUN BLD-MCNC: 14 MG/DL (ref 9–23)
BUN/CREAT SERPL: 14.9 (ref 10–20)
CALCIUM BLD-MCNC: 10 MG/DL (ref 8.7–10.4)
CHLORIDE SERPL-SCNC: 105 MMOL/L (ref 98–112)
CHOLEST SERPL-MCNC: 273 MG/DL (ref ?–200)
CLARITY UR: CLEAR
CO2 SERPL-SCNC: 29 MMOL/L (ref 21–32)
CREAT BLD-MCNC: 0.94 MG/DL
DEPRECATED RDW RBC AUTO: 38.3 FL (ref 35.1–46.3)
EGFRCR SERPLBLD CKD-EPI 2021: 71 ML/MIN/1.73M2 (ref 60–?)
EOSINOPHIL # BLD AUTO: 0.09 X10(3) UL (ref 0–0.7)
EOSINOPHIL NFR BLD AUTO: 1.7 %
ERYTHROCYTE [DISTWIDTH] IN BLOOD BY AUTOMATED COUNT: 12.8 % (ref 11–15)
EST. AVERAGE GLUCOSE BLD GHB EST-MCNC: 100 MG/DL (ref 68–126)
FASTING PATIENT LIPID ANSWER: YES
FASTING STATUS PATIENT QL REPORTED: YES
GLOBULIN PLAS-MCNC: 2.7 G/DL (ref 2–3.5)
GLUCOSE BLD-MCNC: 91 MG/DL (ref 70–99)
GLUCOSE UR-MCNC: NORMAL MG/DL
HBA1C MFR BLD: 5.1 % (ref ?–5.7)
HCT VFR BLD AUTO: 45.2 %
HDLC SERPL-MCNC: 47 MG/DL (ref 40–59)
HGB BLD-MCNC: 15.9 G/DL
HGB UR QL STRIP.AUTO: NEGATIVE
IMM GRANULOCYTES # BLD AUTO: 0.02 X10(3) UL (ref 0–1)
IMM GRANULOCYTES NFR BLD: 0.4 %
KETONES UR-MCNC: NEGATIVE MG/DL
LDLC SERPL CALC-MCNC: 167 MG/DL (ref ?–100)
LEUKOCYTE ESTERASE UR QL STRIP.AUTO: NEGATIVE
LYMPHOCYTES # BLD AUTO: 1.63 X10(3) UL (ref 1–4)
LYMPHOCYTES NFR BLD AUTO: 31.4 %
MCH RBC QN AUTO: 29.4 PG (ref 26–34)
MCHC RBC AUTO-ENTMCNC: 35.2 G/DL (ref 31–37)
MCV RBC AUTO: 83.7 FL
MONOCYTES # BLD AUTO: 0.4 X10(3) UL (ref 0.1–1)
MONOCYTES NFR BLD AUTO: 7.7 %
NEUTROPHILS # BLD AUTO: 3.01 X10 (3) UL (ref 1.5–7.7)
NEUTROPHILS # BLD AUTO: 3.01 X10(3) UL (ref 1.5–7.7)
NEUTROPHILS NFR BLD AUTO: 58 %
NITRITE UR QL STRIP.AUTO: NEGATIVE
NONHDLC SERPL-MCNC: 226 MG/DL (ref ?–130)
OSMOLALITY SERPL CALC.SUM OF ELEC: 294 MOSM/KG (ref 275–295)
PH UR: 6 [PH] (ref 5–8)
PLATELET # BLD AUTO: 167 10(3)UL (ref 150–450)
POTASSIUM SERPL-SCNC: 4.1 MMOL/L (ref 3.5–5.1)
PROT SERPL-MCNC: 7.4 G/DL (ref 5.7–8.2)
PROT UR-MCNC: NEGATIVE MG/DL
RBC # BLD AUTO: 5.4 X10(6)UL
SODIUM SERPL-SCNC: 142 MMOL/L (ref 136–145)
SP GR UR STRIP: 1.01 (ref 1–1.03)
TRIGL SERPL-MCNC: 313 MG/DL (ref 30–149)
TSI SER-ACNC: 4.25 MIU/ML (ref 0.55–4.78)
UROBILINOGEN UR STRIP-ACNC: NORMAL
VIT D+METAB SERPL-MCNC: 13.6 NG/ML (ref 30–100)
VLDLC SERPL CALC-MCNC: 64 MG/DL (ref 0–30)
WBC # BLD AUTO: 5.2 X10(3) UL (ref 4–11)

## 2024-06-28 PROCEDURE — 82306 VITAMIN D 25 HYDROXY: CPT | Performed by: INTERNAL MEDICINE

## 2024-06-28 PROCEDURE — 80061 LIPID PANEL: CPT | Performed by: INTERNAL MEDICINE

## 2024-06-28 PROCEDURE — 81003 URINALYSIS AUTO W/O SCOPE: CPT | Performed by: INTERNAL MEDICINE

## 2024-06-28 PROCEDURE — 83036 HEMOGLOBIN GLYCOSYLATED A1C: CPT | Performed by: INTERNAL MEDICINE

## 2024-06-28 PROCEDURE — 80050 GENERAL HEALTH PANEL: CPT | Performed by: INTERNAL MEDICINE

## 2024-07-01 RX ORDER — ROSUVASTATIN CALCIUM 20 MG/1
20 TABLET, COATED ORAL NIGHTLY
Qty: 90 TABLET | Refills: 3 | Status: SHIPPED | OUTPATIENT
Start: 2024-07-01 | End: 2024-09-29

## 2024-07-01 RX ORDER — LEVOTHYROXINE SODIUM 0.03 MG/1
25 TABLET ORAL
Qty: 90 TABLET | Refills: 3 | Status: SHIPPED | OUTPATIENT
Start: 2024-07-01 | End: 2024-09-29

## 2024-07-01 RX ORDER — ERGOCALCIFEROL 1.25 MG/1
50000 CAPSULE ORAL WEEKLY
Qty: 12 CAPSULE | Refills: 1 | Status: SHIPPED | OUTPATIENT
Start: 2024-07-01 | End: 2024-09-17

## 2024-07-02 ENCOUNTER — TELEPHONE (OUTPATIENT)
Dept: INTERNAL MEDICINE CLINIC | Facility: CLINIC | Age: 59
End: 2024-07-02

## 2024-07-02 NOTE — TELEPHONE ENCOUNTER
Patient called the office as she went to  her (4) prescriptions and one prescription was missing.    She is asking that her Wegovy medication please be sent.    [facility] - [place], [place] - [address] [facility] [phone], [phone]   [address] [address]   Phone: [phone] Fax: [phone]   Hours: Not open 24 hours

## 2024-07-05 DIAGNOSIS — I10 PRIMARY HYPERTENSION: Primary | ICD-10-CM

## 2024-07-09 ENCOUNTER — HOSPITAL ENCOUNTER (OUTPATIENT)
Dept: MAMMOGRAPHY | Age: 59
Discharge: HOME | End: 2024-07-09
Attending: INTERNAL MEDICINE
Payer: COMMERCIAL

## 2024-07-09 DIAGNOSIS — Z12.31 ENCOUNTER FOR SCREENING MAMMOGRAM FOR MALIGNANT NEOPLASM OF BREAST: ICD-10-CM

## 2024-07-09 PROCEDURE — 77067 SCR MAMMO BI INCL CAD: CPT | Performed by: INTERNAL MEDICINE

## 2024-07-09 PROCEDURE — 77063 BREAST TOMOSYNTHESIS BI: CPT | Performed by: INTERNAL MEDICINE

## 2024-07-19 ENCOUNTER — EKG ENCOUNTER (OUTPATIENT)
Dept: LAB | Age: 59
End: 2024-07-19
Attending: INTERNAL MEDICINE
Payer: COMMERCIAL

## 2024-07-19 DIAGNOSIS — I10 PRIMARY HYPERTENSION: ICD-10-CM

## 2024-07-19 LAB
ATRIAL RATE: 50 BPM
P AXIS: -6 DEGREES
P-R INTERVAL: 166 MS
Q-T INTERVAL: 452 MS
QRS DURATION: 80 MS
QTC CALCULATION (BEZET): 412 MS
R AXIS: 17 DEGREES
T AXIS: 25 DEGREES
VENTRICULAR RATE: 50 BPM

## 2024-07-19 PROCEDURE — 93005 ELECTROCARDIOGRAM TRACING: CPT

## 2024-07-19 PROCEDURE — 93010 ELECTROCARDIOGRAM REPORT: CPT | Performed by: STUDENT IN AN ORGANIZED HEALTH CARE EDUCATION/TRAINING PROGRAM

## 2024-07-22 DIAGNOSIS — I35.1 AORTIC VALVE INSUFFICIENCY, ETIOLOGY OF CARDIAC VALVE DISEASE UNSPECIFIED: ICD-10-CM

## 2024-07-22 DIAGNOSIS — R00.1 SINUS BRADYCARDIA: Primary | ICD-10-CM

## 2024-07-30 ENCOUNTER — NURSE ONLY (OUTPATIENT)
Facility: CLINIC | Age: 59
End: 2024-07-30

## 2024-07-30 DIAGNOSIS — Z12.11 COLON CANCER SCREENING: Primary | ICD-10-CM

## 2024-07-30 DIAGNOSIS — D37.3 LOW GRADE MUCINOUS NEOPLASM OF APPENDIX: ICD-10-CM

## 2024-07-30 RX ORDER — SODIUM, POTASSIUM,MAG SULFATES 17.5-3.13G
SOLUTION, RECONSTITUTED, ORAL ORAL
Qty: 1 EACH | Refills: 0 | Status: SHIPPED | OUTPATIENT
Start: 2024-07-30

## 2024-07-30 NOTE — PROGRESS NOTES
Okay to schedule    GI Schedulers-  Procedure: colonoscopy  Provider: general pool endoscopist  Dx: average risk CRC screening, hx of hemicolectomy for mucinous neoplasm of appendix  Sedation: MAC   Prep: Split dose suprep    No med changes    Thank you.  Morena Keller, APRN

## 2024-07-30 NOTE — PROGRESS NOTES
Morena- Patient has a hx of hemorrhoids, had her last colon with Duly in 2021. Would you like pt to be seen in office before scheduling or can we schedule directly.      Called patient for scheduled telephone colon screening.  Medications, pharmacy, and allergies verified with the patient.   Please advise on colonoscopy and bowel prep orders.     Age 45-66 y/o (Y/N):   66-76 y/o ? Route to GI provider per rotation schedule   › GI MD preference:   › Insurance:  [facility] HMO  › Last PCP Visit:6/26/2024  IF NO PCP within [facility] ? GI in-person consultation   › Last CBC drawn: 6/28/2024  › Date of positive FIT test:n/a  › H/W/BMI: 5'9/234lb/34.57    Special comments/notes:  Recall    Last Procedure, Date, MD:   6/28/2021   Last diagnosis: Hemorrhoids   Recalled for (mth/yrs):    Sedation used previously: mac   Last Prep Used:    Quality of Prep:      Telephone Colon Screening Questionnaire Yes No   Are you currently experiencing any new/abnormal GI symptoms? [] [x]   If yes, explain:     Rectal bleeding? [] [x]   Black stool? [] [x]   Dysphagia or food \"feeling stuck\" when eating? [] [x]   Intractable vomiting? [] [x]   Unexplained weight loss? [] [x]   First colonoscopy? [] [x]   Family history of colon cancer? [] [x]   Any issues with anesthesia? [] [x]   If yes, explain:      Have you had a stroke, heart attack or stent placement in the last 12 months?  [] [x]   If yes ? GI in-person consultation      Personal history of resp. Issues/oxygen use/DOTTIE/COPD [] [x]   If yes, CPAP/BiPAP?     History of devices (pacemaker/defibrillator) [] [x]   History of cardiac/CVA issues/(MI/stroke) (see above) [] [x]     Medications Yes  No   Anticoagulants  Anticoagulant (Except Aspirin) ? route to RN pool to request adjustments from prescribing provider  [] [x]   Diabetic Meds  PO ? hold DAY PRIOR and DAY OF procedure  Insulin ? route to RN pool to request adjustments from prescribing provider  [] [x]   Weight loss meds  (Phentermine/Vyvanse/Saxsenda/etc): [] [x]   Iron/herbal/multivitamin supplement (RX/OTC): [] [x]   Usage of marijuana, CBD &/or vape products: [] [x]

## 2024-07-31 ENCOUNTER — OFFICE VISIT (OUTPATIENT)
Dept: DERMATOLOGY CLINIC | Facility: CLINIC | Age: 59
End: 2024-07-31

## 2024-07-31 DIAGNOSIS — D22.9 MULTIPLE BENIGN NEVI: ICD-10-CM

## 2024-07-31 DIAGNOSIS — L81.4 LENTIGINES: ICD-10-CM

## 2024-07-31 DIAGNOSIS — D18.01 CHERRY ANGIOMA: ICD-10-CM

## 2024-07-31 DIAGNOSIS — B35.3 TINEA PEDIS OF BOTH FEET: ICD-10-CM

## 2024-07-31 DIAGNOSIS — L82.1 SEBORRHEIC KERATOSES: Primary | ICD-10-CM

## 2024-07-31 PROCEDURE — 99204 OFFICE O/P NEW MOD 45 MIN: CPT | Performed by: STUDENT IN AN ORGANIZED HEALTH CARE EDUCATION/TRAINING PROGRAM

## 2024-07-31 RX ORDER — KETOCONAZOLE 20 MG/G
CREAM TOPICAL
Qty: 60 G | Refills: 2 | Status: SHIPPED | OUTPATIENT
Start: 2024-07-31

## 2024-07-31 NOTE — PROGRESS NOTES
Scheduled for:  Colonoscopy [address]85  Provider Name:     Date:  Mon 11/18/2024  Location:  [facility]  Sedation:  Mac  Time:  1;30pm (pt is aware to [facility] will call with arrival time     Prep:  Suprep   Meds/Allergies Reconciled?:  Yes    Diagnosis with codes:  CRC screening,Z12.11 hx of hemicolectomy for mucinous neoplasm of appendix D37.3  Was patient informed to call insurance with codes (Y/N):  Yes     Referral sent?:  Referral was sent at the time of electronic surgical scheduling.      [facility] or [facility] notified?:  I sent an electronic request to Endo Scheduling and received a confirmation today.       Medication Orders:  None  Misc Orders:  None     Further instructions given by staff:  I discussed the prep instructions with the patient which she verbally understood and is aware that I will send the instructions today.via [employer]

## 2024-07-31 NOTE — PROGRESS NOTES
July 31, 2024    New patient     CHIEF COMPLAINT: FBSE    HISTORY OF PRESENT ILLNESS: .    - No particular lesions of concern.       DERM HISTORY:  History of skin cancer: No    FAMILY HISTORY:  History of melanoma: No    PAST MEDICAL HISTORY:  Past Medical History:    Arrhythmia    Hx of irregular heartbeat, cardiac workup completed 2016, negative per cardiologist Dr. Joey Orantes, no treatment    Back problem    Neck pain, into left hand with numbness and tingling    DEPRESSION    HERPES SIMPLEX    shingles on right upper arm, genital, not oral    HYPERLIPIDEMIA    Low grade mucinous neoplasm of appendix    Migraines    Blurred Vision r/t headache    Migraines    NEUROCARDIOGENIC DISEASE    Other and unspecified hyperlipidemia    Thyroid disease       REVIEW OF SYSTEMS:  Constitutional: Denies fever, chills, unintentional weight loss.   Skin as per HPI    Medications  Current Outpatient Medications   Medication Sig Dispense Refill    Na Sulfate-K Sulfate-Mg Sulf (SUPREP BOWEL PREP KIT) 17.5-3.13-1.6 GM/177ML Oral Solution Take as directed by GI clinic. Okay to substitute for generic. 1 each 0    ergocalciferol 1.25 MG (28571 UT) Oral Cap Take 1 capsule (50,000 Units total) by mouth once a week for 12 doses. 12 capsule 1    levothyroxine 25 MCG Oral Tab Take 1 tablet (25 mcg total) by mouth before breakfast. 90 tablet 3    rosuvastatin 20 MG Oral Tab Take 1 tablet (20 mg total) by mouth nightly. 90 tablet 3    losartan 50 MG Oral Tab Take 1 tablet (50 mg total) by mouth daily. 90 tablet 3       PHYSICAL EXAM:  Patient declined chaperone   General: awake, alert, no acute distress  Skin: Skin exam was performed today including the following: head and face, scalp, neck, chest (including breasts and axillae), abdomen, back, bilateral upper extremities, bilateral lower extremities, hands, feet, digits, nails. Pertinent findings include:   - Scattered bright red-purple dome-shaped papules on the trunk and extremities   -  Scattered light brown stellate macules on sun exposed sites  - Scattered, evenly colored, round brown macules and papules with regular borders on the trunk and extremities  - Numerous scattered skin-colored and brown, waxy, stuck-on papules and plaques on the trunk and extremities      ASSESSMENT & PLAN:  Pathophysiology of diagnoses discussed with patient.  Therapeutic options reviewed. Risks, benefits, and alternatives discussed with patient. Instructions reviewed at length.    #Lentigines  #Seborrheic keratoses   #Cherry angiomas   - Reassurance provided regarding the benign nature of these lesions.    #Multiple benign nevi  - Complete skin exam performed today with no outlier lesions identified   - Reassured patient of benign nature of these lesions.   - Recommend daily photoprotection with broad-spectrum sunscreen, avoidance of sun during peak hours, and sun protective clothing.    - Dermoscopy was used for physical examination of pigmented lesions during today's office visit.    #Tinea pedis  - Start ketoconazole cream twice daily  for 4 weeks     Return to clinic: 2-3 years  or sooner if something concerning arises     Jose Luis Koehler MD

## 2024-08-08 ENCOUNTER — APPOINTMENT (OUTPATIENT)
Dept: ULTRASOUND IMAGING | Age: 59
End: 2024-08-08
Attending: INTERNAL MEDICINE
Payer: COMMERCIAL

## 2024-08-08 ENCOUNTER — HOSPITAL ENCOUNTER (OUTPATIENT)
Dept: CT IMAGING | Age: 59
End: 2024-08-08
Attending: INTERNAL MEDICINE
Payer: COMMERCIAL

## 2024-08-22 RX ORDER — LOSARTAN POTASSIUM 50 MG/1
50 TABLET ORAL DAILY
Qty: 90 TABLET | Refills: 1 | Status: SHIPPED | OUTPATIENT
Start: 2024-08-22 | End: 2025-02-18

## 2024-08-22 NOTE — TELEPHONE ENCOUNTER
MEDICATION REFILL REQUEST:    Future Appointment: 10/14/2024    Last appointment: 08/26/2024    Medication requested:   Requested Prescriptions     Signed Prescriptions Disp Refills    losartan 50 MG Oral Tab 90 tablet 1     Sig: Take 1 tablet (50 mg total) by mouth daily.     Authorizing Provider: ERIN CASTAÑEDA     Ordering User: JEIMY ZACARIAS     Last refill date:   Disp Refills Start End    losartan 50 MG Oral Tab  90 tablet 3 6/26/2024 8/22/2024    Sig - Route: Take 1 tablet (50 mg total) by mouth daily. - Oral    Patient not taking: Reported on 7/31/2024    Sent to pharmacy as: Losartan Potassium 50 MG Oral Tablet (Cozaar)    E-Prescribing Status: Receipt confirmed by pharmacy (6/26/2024 11:32 AM CDT)

## 2024-08-29 ENCOUNTER — HOSPITAL ENCOUNTER (OUTPATIENT)
Dept: CV DIAGNOSTICS | Facility: HOSPITAL | Age: 59
Discharge: HOME | End: 2024-08-29
Attending: INTERNAL MEDICINE
Payer: COMMERCIAL

## 2024-08-29 DIAGNOSIS — I35.1 AORTIC VALVE INSUFFICIENCY, ETIOLOGY OF CARDIAC VALVE DISEASE UNSPECIFIED: ICD-10-CM

## 2024-08-29 DIAGNOSIS — R00.1 SINUS BRADYCARDIA: ICD-10-CM

## 2024-08-29 PROCEDURE — 93306 TTE W/DOPPLER COMPLETE: CPT | Performed by: INTERNAL MEDICINE

## 2024-09-05 ENCOUNTER — HOSPITAL ENCOUNTER (OUTPATIENT)
Dept: MRI IMAGING | Facility: HOSPITAL | Age: 59
Discharge: HOME | End: 2024-09-05
Attending: INTERNAL MEDICINE
Payer: COMMERCIAL

## 2024-09-05 DIAGNOSIS — D37.3 LOW GRADE MUCINOUS NEOPLASM OF APPENDIX: ICD-10-CM

## 2024-09-05 DIAGNOSIS — Z00.00 ANNUAL PHYSICAL EXAM: ICD-10-CM

## 2024-09-05 DIAGNOSIS — Z12.31 ENCOUNTER FOR SCREENING MAMMOGRAM FOR MALIGNANT NEOPLASM OF BREAST: ICD-10-CM

## 2024-09-05 PROCEDURE — A9575 INJ GADOTERATE MEGLUMI 0.1ML: HCPCS | Performed by: INTERNAL MEDICINE

## 2024-09-05 PROCEDURE — 72197 MRI PELVIS W/O & W/DYE: CPT | Performed by: INTERNAL MEDICINE

## 2024-09-05 PROCEDURE — 74183 MRI ABD W/O CNTR FLWD CNTR: CPT | Performed by: INTERNAL MEDICINE

## 2024-09-05 RX ORDER — GADOTERATE MEGLUMINE 376.9 MG/ML
20 INJECTION INTRAVENOUS
Status: COMPLETED | OUTPATIENT
Start: 2024-09-05 | End: 2024-09-05

## 2024-09-05 RX ADMIN — GADOTERATE MEGLUMINE 20 ML: 376.9 INJECTION INTRAVENOUS at 08:47:00

## 2024-09-09 DIAGNOSIS — K76.0 HEPATIC STEATOSIS: Primary | ICD-10-CM

## 2024-09-12 ENCOUNTER — HOSPITAL ENCOUNTER (OUTPATIENT)
Dept: CT IMAGING | Age: 59
Discharge: HOME | End: 2024-09-12
Attending: INTERNAL MEDICINE

## 2024-09-12 DIAGNOSIS — D37.3 LOW GRADE MUCINOUS NEOPLASM OF APPENDIX: ICD-10-CM

## 2024-09-12 DIAGNOSIS — Z00.00 ANNUAL PHYSICAL EXAM: ICD-10-CM

## 2024-09-12 DIAGNOSIS — Z12.31 ENCOUNTER FOR SCREENING MAMMOGRAM FOR MALIGNANT NEOPLASM OF BREAST: ICD-10-CM

## 2024-09-15 ENCOUNTER — PATIENT MESSAGE (OUTPATIENT)
Dept: INTERNAL MEDICINE CLINIC | Facility: CLINIC | Age: 59
End: 2024-09-15

## 2024-09-15 DIAGNOSIS — R00.0 TACHYCARDIA: Primary | ICD-10-CM

## 2024-09-17 NOTE — TELEPHONE ENCOUNTER
Delphine Currie, [facility] 9/16/2024 8:49 AM CDT    Needs an appt?  ----- Message -----  From: Olimpia Medina  Sent: 9/15/2024 9:52 AM CDT  To: Jose G Heath Clinical Staff  Subject: Heart rate     Hi Dr Samuels   Last night I was at a wedding. I drank two whiskey sours that I diluted the heck out of bc they were so strong, and two vodka tonics that I couldn’t even taste the alcohol in. This was from 5:00 until the event started. I danced a line dance which elevated my heart rate, and when I came off the dance floor my heart rate took off. I took a screen shot of my measurements, it’s attached. I know it’s from being dehydrated, and that the solution is electrolytes. I try to drink a lot of water, maybe yesterday I was busy and didn’t have a chance to. Anyway - there was a Dr at the wedding and he told me to follow up with you so I am.

## 2024-09-18 DIAGNOSIS — R93.1 ELEVATED CORONARY ARTERY CALCIUM SCORE: Primary | ICD-10-CM

## 2024-09-30 ENCOUNTER — TELEPHONE (OUTPATIENT)
Dept: INTERNAL MEDICINE CLINIC | Facility: CLINIC | Age: 59
End: 2024-09-30

## 2024-09-30 NOTE — TELEPHONE ENCOUNTER
Referral was written on 9/18 by Dr Samuels however since there was no active insurance on file-- the referral remains open bc there is no insurance company to authorize the referral/ payment for services.  Cannot send a referral to cardiology office.    Appears pt had HMOI insurance previously- possibly a HMO site # change issue that is causing  the insurance ineligibility issue?  Referred this message to a PSR to investigate/ discuss insurance status w/ pt.

## 2024-09-30 NOTE — TELEPHONE ENCOUNTER
Patient called the office and left a voicemail.  She is at her Cardiologist and they are in need of her referral.    There fax number is [phone].

## 2024-10-01 ENCOUNTER — LAB ENCOUNTER (OUTPATIENT)
Dept: LAB | Facility: HOSPITAL | Age: 59
End: 2024-10-01
Attending: INTERNAL MEDICINE
Payer: COMMERCIAL

## 2024-10-01 DIAGNOSIS — R93.1 ELEVATED CORONARY ARTERY CALCIUM SCORE: ICD-10-CM

## 2024-10-01 LAB
CHOLEST SERPL-MCNC: 201 MG/DL (ref ?–200)
FASTING PATIENT LIPID ANSWER: YES
HDLC SERPL-MCNC: 44 MG/DL (ref 40–59)
LDLC SERPL CALC-MCNC: 106 MG/DL (ref ?–100)
NONHDLC SERPL-MCNC: 157 MG/DL (ref ?–130)
TRIGL SERPL-MCNC: 296 MG/DL (ref 30–149)
VLDLC SERPL CALC-MCNC: 51 MG/DL (ref 0–30)

## 2024-10-01 PROCEDURE — 36415 COLL VENOUS BLD VENIPUNCTURE: CPT

## 2024-10-01 PROCEDURE — 80061 LIPID PANEL: CPT

## 2024-10-01 RX ORDER — ROSUVASTATIN CALCIUM 40 MG/1
40 TABLET, COATED ORAL NIGHTLY
Qty: 90 TABLET | Refills: 1 | Status: SHIPPED | OUTPATIENT
Start: 2024-10-01 | End: 2024-12-30

## 2024-10-15 ENCOUNTER — HOSPITAL ENCOUNTER (OUTPATIENT)
Dept: ULTRASOUND IMAGING | Facility: HOSPITAL | Age: 59
Discharge: HOME | End: 2024-10-15
Attending: INTERNAL MEDICINE
Payer: COMMERCIAL

## 2024-10-15 DIAGNOSIS — K76.0 HEPATIC STEATOSIS: ICD-10-CM

## 2024-10-15 PROCEDURE — 76705 ECHO EXAM OF ABDOMEN: CPT | Performed by: INTERNAL MEDICINE

## 2024-10-15 PROCEDURE — 76981 USE PARENCHYMA: CPT | Performed by: INTERNAL MEDICINE

## 2024-10-28 ENCOUNTER — MED REC SCAN ONLY (OUTPATIENT)
Dept: INTERNAL MEDICINE CLINIC | Facility: CLINIC | Age: 59
End: 2024-10-28

## 2024-10-30 ENCOUNTER — HOSPITAL ENCOUNTER (OUTPATIENT)
Dept: GENERAL RADIOLOGY | Facility: HOSPITAL | Age: 59
Discharge: HOME | End: 2024-10-30
Attending: INTERNAL MEDICINE
Payer: COMMERCIAL

## 2024-10-30 ENCOUNTER — LAB ENCOUNTER (OUTPATIENT)
Dept: LAB | Facility: HOSPITAL | Age: 59
End: 2024-10-30
Attending: INTERNAL MEDICINE
Payer: COMMERCIAL

## 2024-10-30 DIAGNOSIS — R93.1 AGATSTON CORONARY ARTERY CALCIUM SCORE GREATER THAN 400: Primary | ICD-10-CM

## 2024-10-30 DIAGNOSIS — R93.1 AGATSTON CORONARY ARTERY CALCIUM SCORE GREATER THAN 400: ICD-10-CM

## 2024-10-30 DIAGNOSIS — R00.2 PALPITATIONS: ICD-10-CM

## 2024-10-30 LAB
ANION GAP SERPL CALC-SCNC: 5 MMOL/L (ref 0–18)
BASOPHILS # BLD AUTO: 0.05 X10(3) UL (ref 0–0.2)
BASOPHILS NFR BLD AUTO: 0.9 %
BUN BLD-MCNC: 16 MG/DL (ref 9–23)
BUN/CREAT SERPL: 18.2 (ref 10–20)
CALCIUM BLD-MCNC: 9.7 MG/DL (ref 8.7–10.4)
CHLORIDE SERPL-SCNC: 108 MMOL/L (ref 98–112)
CO2 SERPL-SCNC: 30 MMOL/L (ref 21–32)
CREAT BLD-MCNC: 0.88 MG/DL
DEPRECATED RDW RBC AUTO: 38.3 FL (ref 35.1–46.3)
EGFRCR SERPLBLD CKD-EPI 2021: 77 ML/MIN/1.73M2 (ref 60–?)
EOSINOPHIL # BLD AUTO: 0.12 X10(3) UL (ref 0–0.7)
EOSINOPHIL NFR BLD AUTO: 2.3 %
ERYTHROCYTE [DISTWIDTH] IN BLOOD BY AUTOMATED COUNT: 12.2 % (ref 11–15)
FASTING STATUS PATIENT QL REPORTED: YES
GLUCOSE BLD-MCNC: 104 MG/DL (ref 70–99)
HCT VFR BLD AUTO: 43.8 %
HGB BLD-MCNC: 15.5 G/DL
IMM GRANULOCYTES # BLD AUTO: 0.01 X10(3) UL (ref 0–1)
IMM GRANULOCYTES NFR BLD: 0.2 %
LYMPHOCYTES # BLD AUTO: 1.9 X10(3) UL (ref 1–4)
LYMPHOCYTES NFR BLD AUTO: 36.1 %
MCH RBC QN AUTO: 30.6 PG (ref 26–34)
MCHC RBC AUTO-ENTMCNC: 35.4 G/DL (ref 31–37)
MCV RBC AUTO: 86.4 FL
MONOCYTES # BLD AUTO: 0.46 X10(3) UL (ref 0.1–1)
MONOCYTES NFR BLD AUTO: 8.7 %
NEUTROPHILS # BLD AUTO: 2.73 X10 (3) UL (ref 1.5–7.7)
NEUTROPHILS # BLD AUTO: 2.73 X10(3) UL (ref 1.5–7.7)
NEUTROPHILS NFR BLD AUTO: 51.8 %
OSMOLALITY SERPL CALC.SUM OF ELEC: 297 MOSM/KG (ref 275–295)
PLATELET # BLD AUTO: 173 10(3)UL (ref 150–450)
POTASSIUM SERPL-SCNC: 4.4 MMOL/L (ref 3.5–5.1)
RBC # BLD AUTO: 5.07 X10(6)UL
SODIUM SERPL-SCNC: 143 MMOL/L (ref 136–145)
WBC # BLD AUTO: 5.3 X10(3) UL (ref 4–11)

## 2024-10-30 PROCEDURE — 71046 X-RAY EXAM CHEST 2 VIEWS: CPT | Performed by: INTERNAL MEDICINE

## 2024-10-30 PROCEDURE — 85025 COMPLETE CBC W/AUTO DIFF WBC: CPT

## 2024-10-30 PROCEDURE — 80048 BASIC METABOLIC PNL TOTAL CA: CPT

## 2024-10-30 PROCEDURE — 36415 COLL VENOUS BLD VENIPUNCTURE: CPT

## 2024-10-31 ENCOUNTER — TELEPHONE (OUTPATIENT)
Dept: INTERNAL MEDICINE CLINIC | Facility: CLINIC | Age: 59
End: 2024-10-31

## 2024-10-31 RX ORDER — ROSUVASTATIN CALCIUM 40 MG/1
40 TABLET, COATED ORAL NIGHTLY
Qty: 90 TABLET | Refills: 1 | Status: SHIPPED | OUTPATIENT
Start: 2024-10-31 | End: 2025-01-29

## 2024-10-31 NOTE — TELEPHONE ENCOUNTER
[facility] called requesting a refill,  Rosuvastatin 40 MG Oral Tab medication.    No refills left and is switching to this pharmacy.    [id number] - [place], [place] - [address] [place] [address] [phone], [phone]   [facility] [place] [address]   Phone: [phone] Fax: [phone]   Hours: Not open 24 hours

## 2024-10-31 NOTE — TELEPHONE ENCOUNTER
Reordered prescription for Rosuvastatin 40 MG and switched to different pharmacy given below.   ALICJA HANNA

## 2024-11-01 ENCOUNTER — TELEPHONE (OUTPATIENT)
Dept: INTERNAL MEDICINE CLINIC | Facility: CLINIC | Age: 59
End: 2024-11-01

## 2024-11-01 DIAGNOSIS — K76.0 HEPATIC STEATOSIS: Primary | ICD-10-CM

## 2024-11-01 NOTE — TELEPHONE ENCOUNTER
External Hepatology referral for Darren Santana 5 visits entered.     Prior referral from 9/9/24 for Dr. Banks Referral # [id number] appears not reviewed by [facility].     Message sent to Lyndsey ALICIA Referral specialist of managed care to expedite.     Mychart notification sent to Olimpia.

## 2024-11-01 NOTE — TELEPHONE ENCOUNTER
Sandhya called from [facility] needed a referral for this patient   Who has appt scheduled 11/12    Sandhya states she called 3 times regarding this please advise as she has a HMO insurance     Salvador Banks MD    [facility]  [phone]  Fax: [phone]

## 2024-11-04 ENCOUNTER — PATIENT MESSAGE (OUTPATIENT)
Dept: INTERNAL MEDICINE CLINIC | Facility: CLINIC | Age: 59
End: 2024-11-04

## 2024-11-05 ENCOUNTER — OFFICE VISIT (OUTPATIENT)
Dept: INTERNAL MEDICINE CLINIC | Facility: CLINIC | Age: 59
End: 2024-11-05
Payer: COMMERCIAL

## 2024-11-05 VITALS
HEIGHT: 69 IN | SYSTOLIC BLOOD PRESSURE: 120 MMHG | HEART RATE: 82 BPM | OXYGEN SATURATION: 100 % | WEIGHT: 241.38 LBS | BODY MASS INDEX: 35.75 KG/M2 | DIASTOLIC BLOOD PRESSURE: 78 MMHG

## 2024-11-05 DIAGNOSIS — K76.0 HEPATIC STEATOSIS: Primary | ICD-10-CM

## 2024-11-05 DIAGNOSIS — E78.5 HYPERLIPIDEMIA, UNSPECIFIED HYPERLIPIDEMIA TYPE: ICD-10-CM

## 2024-11-05 DIAGNOSIS — H61.21 IMPACTED CERUMEN OF RIGHT EAR: ICD-10-CM

## 2024-11-05 DIAGNOSIS — E66.9 OBESITY (BMI 30-39.9): ICD-10-CM

## 2024-11-05 DIAGNOSIS — H57.89 EYE SWELLING, RIGHT: ICD-10-CM

## 2024-11-05 DIAGNOSIS — R93.1 ELEVATED CORONARY ARTERY CALCIUM SCORE: ICD-10-CM

## 2024-11-05 DIAGNOSIS — H93.8X1 SENSATION OF PLUGGED EAR ON RIGHT SIDE: ICD-10-CM

## 2024-11-05 PROCEDURE — 3074F SYST BP LT 130 MM HG: CPT | Performed by: INTERNAL MEDICINE

## 2024-11-05 PROCEDURE — 99214 OFFICE O/P EST MOD 30 MIN: CPT | Performed by: INTERNAL MEDICINE

## 2024-11-05 PROCEDURE — 3078F DIAST BP <80 MM HG: CPT | Performed by: INTERNAL MEDICINE

## 2024-11-05 PROCEDURE — 3008F BODY MASS INDEX DOCD: CPT | Performed by: INTERNAL MEDICINE

## 2024-11-05 RX ORDER — LEVOTHYROXINE SODIUM 25 UG/1
25 TABLET ORAL
COMMUNITY

## 2024-11-05 RX ORDER — ERGOCALCIFEROL 1.25 MG/1
CAPSULE, LIQUID FILLED ORAL
COMMUNITY
Start: 2024-10-03

## 2024-11-05 RX ORDER — TIRZEPATIDE 5 MG/.5ML
5 INJECTION, SOLUTION SUBCUTANEOUS WEEKLY
Qty: 6 ML | Refills: 0 | Status: SHIPPED | OUTPATIENT
Start: 2024-11-05 | End: 2024-11-06

## 2024-11-05 RX ORDER — ASPIRIN 81 MG/1
TABLET ORAL
COMMUNITY
Start: 2024-09-30

## 2024-11-05 NOTE — PROGRESS NOTES
Olimpia Medina is a 59 year old female.    Chief complaint: right ear symptoms     HPI:     Olimpia Medina is a 59 year old pleasant female who presents for right ear symptoms     1 week ago   Felt like something is crawling in her ear   Then did put peroxide Sunday 2 days ago   Felt blocked  Then yesterday noticed the right ear is swollen   No ear pain right now   Yesterday felt plugged but today feeling better   Now hearing is at baseline   No discharge   No blood   No fever or chills       Right eye swelling   Did use clinique new eye cream   Started using it 2 weeks ago   No dischare from the eye   No bump         HL   On statin   Elevated CAC   She is scheduled to have cardiac cath       Hepatic steatosis   Obesity   Trying to loose weight   Insurance doesn't cover Glp1/ GIP injections           Current Outpatient Medications   Medication Sig Dispense Refill    aspirin 81 MG Oral Tab EC aspirin 81 mg tablet,delayed release, [RxNorm: 239682]      levothyroxine 25 MCG Oral Tab Take 1 tablet (25 mcg total) by mouth before breakfast.      ergocalciferol 1.25 MG (25418 UT) Oral Cap TAKE 1 CAPSULE EVERY WEEK FOR 12 DOSES      rosuvastatin 40 MG Oral Tab Take 1 tablet (40 mg total) by mouth nightly. 90 tablet 1    losartan 50 MG Oral Tab Take 1 tablet (50 mg total) by mouth daily. 90 tablet 1    ketoconazole 2 % External Cream Apply to affected area 2 times daily for 4 weeks 60 g 2    Na Sulfate-K Sulfate-Mg Sulf (SUPREP BOWEL PREP KIT) 17.5-3.13-1.6 GM/177ML Oral Solution Take as directed by GI clinic. Okay to substitute for generic. (Patient not taking: Reported on 11/5/2024) 1 each 0      Past Medical History:    Arrhythmia    Hx of irregular heartbeat, cardiac workup completed 2016, negative per cardiologist Dr. Joey Orantes, no treatment    Back problem    Neck pain, into left hand with numbness and tingling    DEPRESSION    HERPES SIMPLEX    shingles on right upper arm, genital, not oral    HYPERLIPIDEMIA     Low grade mucinous neoplasm of appendix    Migraines    Blurred Vision r/t headache    Migraines    NEUROCARDIOGENIC DISEASE    Other and unspecified hyperlipidemia    Thyroid disease     Past Surgical History:   Procedure Laterality Date    Appendectomy  07/10/2018    Laparoscopic Cecectomy, Drainage of Appendiceal abcess    Appendectomy      Colonoscopy N/A 2018    Procedure: COLONOSCOPY, POSSIBLE BIOPSY, POSSIBLE POLYPECTOMY 64343;  Surgeon: Guillermo Meyer MD;  Location: [facility]    Colonoscopy N/A 2021    Procedure: COLONOSCOPY;  Surgeon: Guillermo Meyer MD;  Location: [facility]    Lasik enhancement - od - right eye Bilateral     Other surgical history  01/10/2011    flex cysto, dr alvarez    Other surgical history  2011    Rt knee torn meniscus repair    Other surgical history Left 2015    Procedure: KNEE ARTHROSCOPY;  Surgeon: Eduardo Hidalgo MD;  Location:  MAIN OR    Other surgical history  2018    robotic right hemicolectomy    Other surgical history      lef t meniscus repair knee             Family History   Problem Relation Age of Onset    Hypertension Mother     Lipids Mother     Heart Disorder Mother         heavy smoker    Other (PVD) Mother     Diabetes Maternal Grandmother     Hypertension Maternal Grandmother     Lipids Maternal Grandmother     Obesity Maternal Grandmother     Heart Disorder Maternal Grandmother         MI  60's    Cancer Paternal Grandmother         colon cancer 50's, 92    Cancer Paternal Grandfather         bladder ca 60's    Cancer Father         Bladder cancer,  late 60'd    Hypertension Brother     Ovarian Cancer Paternal Cousin Female         under 50     Patient Active Problem List   Diagnosis    Low grade mucinous neoplasm of appendix    Preoperative testing    Lipoma of left upper extremity    Bronchiectasis with acute lower respiratory infection (HCC)    Hepatic steatosis    Microscopic  hematuria    Obesity (BMI 30-39.9)    Mixed hyperlipidemia    Mass of left forearm    Essential hypertension    Liver cyst    Vitamin D deficiency    Encounter for screening mammogram for malignant neoplasm of breast       REVIEW OF SYSTEMS:   A comprehensive 10 point review of systems was completed.  Pertinent positives and negatives noted in the the HPI            EXAM:   /78   Pulse 82   Ht 5' 9\" (1.753 m)   Wt 241 lb 6.4 oz (109.5 kg)   SpO2 100%   BMI 35.65 kg/m²   GENERAL: well developed, well nourished,in no apparent distress    HEENT: atraumatic, normocephalic,ear: left ear is normal   Right ear with cerumen impaction   Verbal consent to use irrigation and curette     Discussed risk of injury / infection   Would like to have it done     Right eye swollen periorbital area below lower eyelid   No bumps   No discharge                Orders Placed This Encounter    aspirin 81 MG Oral Tab EC     Sig: aspirin 81 mg tablet,delayed release, [RxNorm: 630676]    levothyroxine 25 MCG Oral Tab     Sig: Take 1 tablet (25 mcg total) by mouth before breakfast.    ergocalciferol 1.25 MG (91444 UT) Oral Cap     Sig: TAKE 1 CAPSULE EVERY WEEK FOR 12 DOSES     XR CHEST PA + LAT CHEST (CPT=71046)    Result Date: 10/30/2024  CONCLUSION: No acute cardiopulmonary process.    Dictated by (CST): Clem Norwood MD on 10/30/2024 at 9:25 AM     Finalized by (CST): Clem Norwood MD on 10/30/2024 at 9:26 AM          US LIVER WITH ELASTOGRAPHY(CPT=76705,25720)    Result Date: 10/15/2024  CONCLUSION:  1. Hepatomegaly with sonographic features of hepatic steatosis and simple-appearing hepatic cysts.  2. Mild hepatic fibrosis by elastography assessment (Metavir score F1).  3. Negative for hepatobiliary dilatation.  4. Lesser incidental findings as above.     Dictated by (CST): Speedy Chance MD on 10/15/2024 at 8:57 AM     Finalized by (CST): Speedy Chacne MD on 10/15/2024 at 9:01 AM          CT CALCIUM SCORING    Result  Date: 2024  PROCEDURE:  CT CALCIUM SCORING  COMPARISON:  None.  INDICATIONS:  Z12.31 Encounter for screening mammogram for malignant neoplasm of breast Z00.00 Annual physical exam D37.3 Low grade mucinous neoplasm of appendix  TECHNIQUE:  The patient was placed in the supine position on the multidetector CT table and high-resolution non-contrast limited CT images of the heart, coronary arteries and proximal great vessels were obtained. Dose reduction techniques were used. Dose  information is transmitted to the ACR (American College of Radiology) NRDR (National Radiology Data Registry) which includes the Dose Index Registry. This cardiac scan was interpreted by a cardiologist with respect to the heart only. Please consult the radiology report for further interpretation  FINDINGS:   REGION  CALCIUM SCORE  LEFT MAIN:  0 LEFT ANTERIOR DESCENDIN CIRCUMFLEX:  577 RIGHT CORONARY ARTERY:    554  TOTAL CALCIUM SCORE:  1485  Calcium Score  Implication   0  No identifiable calcified plaque   1-100  Mild atherosclerotic plaque   101-300  Moderate atherosclerotic plaque   301-999  Moderate-severe atherosclerotic plaque   >1000  Severe atherosclerotic plaque  1. J Am Valentín Cardiol Img. 2022 Nov, 15 [phone]) [phone]  Clinical Relevance of Coronary Artery Scan  This patient identified you as their physician, if this is not correct please contact the Nurse [facility] at [phone] and they will assign this report to another physician.    Dictated by (CST): Kamari Govea MD on 2024 at 12:08 PM     Finalized by (CST): Kamari Govea MD on 2024 at 12:09 PM       CT CALCIUM SCORING OVER READ    Result Date: 2024  CONCLUSION:  There are no significant incidental findings.    LOCATION:  [place]   Dictated by (CST): Joey Mahoney MD on 2024 at 7:59 AM     Finalized by (CST): Joey Mahoney MD on 2024 at 8:01 AM       MRI ABDOMEN+PELVIS (ALL W+WO) (CPT=74183/36606)    Result Date: 2024  CONCLUSION:  1. There  is a history of low-grade appendiceal mucinous neoplasm status post cecectomy.  No evidence of recurrent malignancy/metastases in the abdomen or pelvis. 2. Single mildly prominent mesenteric lymph node in the left upper quadrant is stable dating back to 2019 consistent with a benign/reactive lymph node 3. Stable moderate hepatomegaly with evidence of severe hepatic steatosis.  There is also stable mild splenomegaly suggesting underlying portal venous hypertension.     Dictated by (CST): Ruslan Aguilar MD on 9/05/2024 at 3:00 PM     Finalized by (CST): Ruslan Aguilar MD on 9/05/2024 at 3:15 PM                ASSESSMENT AND PLAN:   1. Obesity (BMI 30-39.9)  Advised to start Zepbound vials since insurance doesn't cover injection   Sent 5 mg but to use 2.5 mg weekly   Discussed black box warning and side effects no family hx of thyroid cancer no history of pancreatitis   Goal weight loss is 12 lbs in 3 montsh   - aspirin 81 MG Oral Tab EC; aspirin 81 mg tablet,delayed release, [RxNorm: 363588]  - levothyroxine 25 MCG Oral Tab; Take 1 tablet (25 mcg total) by mouth before breakfast.  - ergocalciferol 1.25 MG (29409 UT) Oral Cap; TAKE 1 CAPSULE EVERY WEEK FOR 12 DOSES  - Tirzepatide-Weight Management (ZEPBOUND) 5 MG/0.5ML Subcutaneous Solution; Inject 5 mg into the skin once a week.  Dispense: 6 mL; Refill: 0  - Hepatic Function Panel (7) [E]; Future  - Lipid Panel [E]; Future  - TSH W Reflex To Free T4; Future  - ENT Referral - In Network    2. Hepatic steatosis  Advised weight loss 5-10% body weight   Follow up with the hepatologist     - aspirin 81 MG Oral Tab EC; aspirin 81 mg tablet,delayed release, [RxNorm: 635848]  - levothyroxine 25 MCG Oral Tab; Take 1 tablet (25 mcg total) by mouth before breakfast.  - ergocalciferol 1.25 MG (53280 UT) Oral Cap; TAKE 1 CAPSULE EVERY WEEK FOR 12 DOSES  - Tirzepatide-Weight Management (ZEPBOUND) 5 MG/0.5ML Subcutaneous Solution; Inject 5 mg into the skin once a week.   Dispense: 6 mL; Refill: 0  - Hepatic Function Panel (7) [E]; Future  - Lipid Panel [E]; Future  - TSH W Reflex To Free T4; Future  - ENT Referral - In Network    3. Hyperlipidemia, unspecified hyperlipidemia type  Lipid panel in 2 months   Continue statin   Goal LDL is less than 70    - aspirin 81 MG Oral Tab EC; aspirin 81 mg tablet,delayed release, [RxNorm: 634205]  - levothyroxine 25 MCG Oral Tab; Take 1 tablet (25 mcg total) by mouth before breakfast.  - ergocalciferol 1.25 MG (54723 UT) Oral Cap; TAKE 1 CAPSULE EVERY WEEK FOR 12 DOSES  - Tirzepatide-Weight Management (ZEPBOUND) 5 MG/0.5ML Subcutaneous Solution; Inject 5 mg into the skin once a week.  Dispense: 6 mL; Refill: 0  - Hepatic Function Panel (7) [E]; Future  - Lipid Panel [E]; Future  - TSH W Reflex To Free T4; Future  - ENT Referral - In Network    4. Sensation of plugged ear on right side  Advised to use OTC ear wax drops   ENT referral   - aspirin 81 MG Oral Tab EC; aspirin 81 mg tablet,delayed release, [RxNorm: 565993]  - levothyroxine 25 MCG Oral Tab; Take 1 tablet (25 mcg total) by mouth before breakfast.  - ergocalciferol 1.25 MG (18182 UT) Oral Cap; TAKE 1 CAPSULE EVERY WEEK FOR 12 DOSES  - Tirzepatide-Weight Management (ZEPBOUND) 5 MG/0.5ML Subcutaneous Solution; Inject 5 mg into the skin once a week.  Dispense: 6 mL; Refill: 0  - Hepatic Function Panel (7) [E]; Future  - Lipid Panel [E]; Future  - TSH W Reflex To Free T4; Future  - ENT Referral - In Network    5. Impacted cerumen of right ear  Unsuccessful attempt to remove ear wax   No post cleaning complications   - aspirin 81 MG Oral Tab EC; aspirin 81 mg tablet,delayed release, [RxNorm: 920933]  - levothyroxine 25 MCG Oral Tab; Take 1 tablet (25 mcg total) by mouth before breakfast.  - ergocalciferol 1.25 MG (97499 UT) Oral Cap; TAKE 1 CAPSULE EVERY WEEK FOR 12 DOSES  - Tirzepatide-Weight Management (ZEPBOUND) 5 MG/0.5ML Subcutaneous Solution; Inject 5 mg into the skin once a week.   Dispense: 6 mL; Refill: 0  - Hepatic Function Panel (7) [E]; Future  - Lipid Panel [E]; Future  - TSH W Reflex To Free T4; Future  - ENT Referral - In Network    6. Elevated coronary artery calcium score  Scheduled to have cardiac cath   Ldl goal is less than 70   - aspirin 81 MG Oral Tab EC; aspirin 81 mg tablet,delayed release, [RxNorm: 003312]  - levothyroxine 25 MCG Oral Tab; Take 1 tablet (25 mcg total) by mouth before breakfast.  - ergocalciferol 1.25 MG (27682 UT) Oral Cap; TAKE 1 CAPSULE EVERY WEEK FOR 12 DOSES  - Tirzepatide-Weight Management (ZEPBOUND) 5 MG/0.5ML Subcutaneous Solution; Inject 5 mg into the skin once a week.  Dispense: 6 mL; Refill: 0  - Hepatic Function Panel (7) [E]; Future  - Lipid Panel [E]; Future  - TSH W Reflex To Free T4; Future  - ENT Referral - In Network    7. Eye swelling, right  Advised to use zyertc   Advised to apply warm compressors   Discussed alarming signs if any to notify me       Follow up in 2 months        I spent 30 minutes at the day of the service seeing the patient, examination, completing charting and counseling the patient and/or on coordination of care.  The diagnosis, prognosis, and general treatment was explained to the patient.   Please return to the clinic if you are having persistent symptoms. If worsening symptoms should go to the ER    Tori Samuels MD,   Diplomate of the American Board of Internal Medicine  Diplomate of the American Board of Obesity Medicine

## 2024-11-06 ENCOUNTER — TELEPHONE (OUTPATIENT)
Dept: INTERNAL MEDICINE CLINIC | Facility: CLINIC | Age: 59
End: 2024-11-06

## 2024-11-06 DIAGNOSIS — H93.8X1 SENSATION OF PLUGGED EAR ON RIGHT SIDE: ICD-10-CM

## 2024-11-06 DIAGNOSIS — E78.5 HYPERLIPIDEMIA, UNSPECIFIED HYPERLIPIDEMIA TYPE: ICD-10-CM

## 2024-11-06 DIAGNOSIS — E66.9 OBESITY (BMI 30-39.9): ICD-10-CM

## 2024-11-06 DIAGNOSIS — K76.0 HEPATIC STEATOSIS: ICD-10-CM

## 2024-11-06 DIAGNOSIS — H61.21 IMPACTED CERUMEN OF RIGHT EAR: ICD-10-CM

## 2024-11-06 DIAGNOSIS — R93.1 ELEVATED CORONARY ARTERY CALCIUM SCORE: ICD-10-CM

## 2024-11-06 RX ORDER — TIRZEPATIDE 5 MG/.5ML
5 INJECTION, SOLUTION SUBCUTANEOUS WEEKLY
Qty: 6 ML | Refills: 0 | Status: SHIPPED | OUTPATIENT
Start: 2024-11-06

## 2024-11-06 NOTE — TELEPHONE ENCOUNTER
Left a message on Olimpia's voice mail to call the office. Mychart message sent to call the office.     Triage symptoms.

## 2024-11-13 ENCOUNTER — HOSPITAL ENCOUNTER (OUTPATIENT)
Dept: INTERVENTIONAL RADIOLOGY/VASCULAR | Facility: HOSPITAL | Age: 59
Discharge: HOME | End: 2024-11-13
Attending: INTERNAL MEDICINE | Admitting: INTERNAL MEDICINE
Payer: COMMERCIAL

## 2024-11-13 VITALS
TEMPERATURE: 98 F | BODY MASS INDEX: 35.4 KG/M2 | WEIGHT: 239 LBS | DIASTOLIC BLOOD PRESSURE: 57 MMHG | HEART RATE: 49 BPM | RESPIRATION RATE: 12 BRPM | SYSTOLIC BLOOD PRESSURE: 123 MMHG | OXYGEN SATURATION: 96 % | HEIGHT: 69 IN

## 2024-11-13 DIAGNOSIS — R93.1 ELEVATED CORONARY ARTERY CALCIUM SCORE: ICD-10-CM

## 2024-11-13 DIAGNOSIS — R94.39 ABNORMAL STRESS TEST: ICD-10-CM

## 2024-11-13 PROCEDURE — 99153 MOD SED SAME PHYS/QHP EA: CPT | Performed by: INTERNAL MEDICINE

## 2024-11-13 PROCEDURE — 36415 COLL VENOUS BLD VENIPUNCTURE: CPT

## 2024-11-13 PROCEDURE — B2111ZZ FLUOROSCOPY OF MULTIPLE CORONARY ARTERIES USING LOW OSMOLAR CONTRAST: ICD-10-PCS | Performed by: INTERNAL MEDICINE

## 2024-11-13 PROCEDURE — 93799 UNLISTED CV SVC/PROCEDURE: CPT | Performed by: INTERNAL MEDICINE

## 2024-11-13 PROCEDURE — 4A023N7 MEASUREMENT OF CARDIAC SAMPLING AND PRESSURE, LEFT HEART, PERCUTANEOUS APPROACH: ICD-10-PCS | Performed by: INTERNAL MEDICINE

## 2024-11-13 PROCEDURE — 93458 L HRT ARTERY/VENTRICLE ANGIO: CPT | Performed by: INTERNAL MEDICINE

## 2024-11-13 PROCEDURE — B2151ZZ FLUOROSCOPY OF LEFT HEART USING LOW OSMOLAR CONTRAST: ICD-10-PCS | Performed by: INTERNAL MEDICINE

## 2024-11-13 PROCEDURE — 99152 MOD SED SAME PHYS/QHP 5/>YRS: CPT | Performed by: INTERNAL MEDICINE

## 2024-11-13 PROCEDURE — 4A033BC MEASUREMENT OF ARTERIAL PRESSURE, CORONARY, PERCUTANEOUS APPROACH: ICD-10-PCS | Performed by: INTERNAL MEDICINE

## 2024-11-13 RX ORDER — LIDOCAINE HYDROCHLORIDE 20 MG/ML
INJECTION, SOLUTION EPIDURAL; INFILTRATION; INTRACAUDAL; PERINEURAL
Status: COMPLETED
Start: 2024-11-13 | End: 2024-11-13

## 2024-11-13 RX ORDER — IOPAMIDOL 612 MG/ML
130 INJECTION, SOLUTION INTRAVASCULAR
Status: COMPLETED | OUTPATIENT
Start: 2024-11-13 | End: 2024-11-13

## 2024-11-13 RX ORDER — SODIUM CHLORIDE 9 MG/ML
3 INJECTION, SOLUTION INTRAVENOUS
Status: COMPLETED | OUTPATIENT
Start: 2024-11-13 | End: 2024-11-13

## 2024-11-13 RX ORDER — ASPIRIN 81 MG/1
324 TABLET, CHEWABLE ORAL ONCE
Status: DISCONTINUED | OUTPATIENT
Start: 2024-11-13 | End: 2024-11-13

## 2024-11-13 RX ORDER — CHLORHEXIDINE GLUCONATE 40 MG/ML
SOLUTION TOPICAL
Status: COMPLETED | OUTPATIENT
Start: 2024-11-13 | End: 2024-11-13

## 2024-11-13 RX ORDER — VERAPAMIL HYDROCHLORIDE 2.5 MG/ML
INJECTION, SOLUTION INTRAVENOUS
Status: COMPLETED
Start: 2024-11-13 | End: 2024-11-13

## 2024-11-13 RX ORDER — HEPARIN SODIUM 1000 [USP'U]/ML
INJECTION, SOLUTION INTRAVENOUS; SUBCUTANEOUS
Status: COMPLETED
Start: 2024-11-13 | End: 2024-11-13

## 2024-11-13 RX ORDER — DIPHENHYDRAMINE HYDROCHLORIDE 50 MG/ML
INJECTION INTRAMUSCULAR; INTRAVENOUS
Status: COMPLETED
Start: 2024-11-13 | End: 2024-11-13

## 2024-11-13 RX ORDER — MIDAZOLAM HYDROCHLORIDE 1 MG/ML
INJECTION INTRAMUSCULAR; INTRAVENOUS
Status: COMPLETED
Start: 2024-11-13 | End: 2024-11-13

## 2024-11-13 RX ORDER — NITROGLYCERIN 20 MG/100ML
INJECTION INTRAVENOUS
Status: DISCONTINUED
Start: 2024-11-13 | End: 2024-11-13 | Stop reason: WASHOUT

## 2024-11-13 RX ADMIN — IOPAMIDOL 130 ML: 612 INJECTION, SOLUTION INTRAVASCULAR at 11:15:00

## 2024-11-13 RX ADMIN — CHLORHEXIDINE GLUCONATE: 40 SOLUTION TOPICAL at 08:45:00

## 2024-11-13 RX ADMIN — SODIUM CHLORIDE 3 ML/KG/HR: 9 INJECTION, SOLUTION INTRAVENOUS at 08:45:00

## 2024-11-13 NOTE — IVS NOTE
DISCHARGE NOTE     Pt is able to sit up and ambulate without difficulty.   Pt voided and tolerated fluids and food.   Procedural site remains dry and intact with good circulation, motion, and sensation.   No signs and symptoms of bleeding/hematoma noted.   IV access removed  Instruction provided, patient/family verbalizes understanding.   Dr. Mallory and Deana spoke with patient/family post procedure.     Pt discharge via wheelchair to [facility]   Pt discharge via stretcher to Nursing Facility     Follow up Appointment: 11/15 1pm with darian and 11/19 [id number] with cami    New Prescription: none

## 2024-11-13 NOTE — PROCEDURES
[facility]  Olimpia Medina Patient Status:  Outpatient    1965 MRN [id number]   Location [facility] INTERVENTIONAL SUITES Attending Evan Mallory MD   Hosp Day # 0 PCP Tori Samuels MD       Cardiologist: Evan Mallory MD  Primary Proceduralist: Evan Mallory MD  Procedure Performed: LHC, IFR of the LAD and ramus.  Date of Procedure: 2024   Indication: Abnormal stress test    Summary of findings: Significant stenosis of the distal left main ostial circumflex.      Left Ventriculography and hemodynamics: LVEDP 2 mmHg      Coronary Angiography  RCA:  Dominant and free of obstructive disease, supplies PDA and PL  Left main: Distal 60 to 70% stenosis  Left anterior descending:  Patent and free of obstructive disease, supplies 2 diagonals which are non-obstructive  Circumflex: Ostial 90% stenosis followed by mid 70 to 80% stenosis, supplies 2 OM branches which are patent  Ramus intermedius luminal irregularities      Assessment:  IFR of the LAD and ramus performed both significant 0.84, 0.79 respectively      Recommendations:  Consult cardiac surgery for bypass evaluation of left coronary system.      Summary of Case: After written informed consent was obtained from the patient, patient was brought to the cardiac catheterization laboratory.  Patient was prepped and draped in the usual sterile fashion. Lidocaine 1% was used to infiltrate the right radial artery for local anesthesia and a 6 [place]an introducer sheath was inserted into the right radial artery.      Selective coronary angiography performed with 6French Tig catheter.  Angiography performed in standard projections.      5 [place]an pigtail catheter was placed for LV hemodynamics.    Specimen sent to: No specimen collected  Estimated blood loss: 10 cc  Closure:  TR band      IV was maintained by RN and moderate conscious sedation of versed and fentanyl was given.  Patient was assessed and  monitoring of oxygen, heart rate and blood pressure by nurse and myself during the exam 37 minutes.      Evan Mallory MD  11/13/24

## 2024-11-13 NOTE — DISCHARGE INSTRUCTIONS
TRANSRADIAL DISCHARGE INSTRUCTION  HOME CARE INSTRUCTIONS FOLLOWING CORONARY ANGIOGRAPHY    Activity  DO NOT drive after the procedure.  You may resume driving late the following day according to the nurse or physician's instructions  Plan on resting and relaxing tonight and tomorrow  Resume your normal activity after 48 hours, or as instructed by your physician  Avoid drinking alcohol for the next 24 hours  Avoid wrist flexion, extension, and fine motor activities (i.e. texting, typing, using computer mouse, etc.) for 24 hours  Do not lift or pull anything heavier than 5 to 8  pounds with affected hand for 1 week  Do not do any strenuous activity until surgery     What is Normal?  A small lump at the procedure site associated with mild tenderness when touched  The procedure site may be bruised or discolored  There may be a small amount of drainage on the bandage    Special Instructions   Drink plenty of fluids during the next 24 hours to \"flush\" the contrast from your system  Keep the bandage clean and dry  After 24 hours, you must remove the bandage. Do not put ointment, powders, or creams to site.  You can shower after removing the bandage, and wash the procedure site gently with soap and water  DO NOT submerge the procedure site for 1 week (no bath tubs or pools)  If you choose to wear a bandage for a few days, make sure it remains clean and dry and that it is changed daily  For local swelling: apply ice  Bleeding can occur at the procedure site - both on the outside of the skin and/or beneath the surface of the skin        If bleeding occurs: Elevate hand above heart and apply local pressure  Swelling or a large lump at the procedure site can occur, which may be accompanied by moderate to severe pain  If the bleeding does not stop, call 911 and continue to apply pressure    When to contact physician:   Swelling, pain, or bleeding at the site that is not relieved by applying ice or pressure  Signs of infection:  Redness, warmth, drainage at the site, chills, or temperature of 100.5 or greater  Changes in sensation, numbness, or tingling of affected hand    Other    You may resume your present diet, unless otherwise specified by your physician.  A list of your medications was provided to you at discharge.  If you are on any METFORMIN containing agents - HOLD for 48 hours

## 2024-11-13 NOTE — INTERVAL H&P NOTE
Pre-op Diagnosis: * No pre-op diagnosis entered *    The above referenced H&P was reviewed by Evan Mallory MD on 11/13/2024, the patient was examined and no significant changes have occurred in the patient's condition since the H&P was performed.  I discussed with the patient and/or legal representative the potential benefits, risks and side effects of this procedure; the likelihood of the patient achieving goals; and potential problems that might occur during recuperation.  I discussed reasonable alternatives to the procedure, including risks, benefits and side effects related to the alternatives and risks related to not receiving this procedure.  We will proceed with procedure as planned.

## 2024-11-14 ENCOUNTER — TELEPHONE (OUTPATIENT)
Dept: INTERNAL MEDICINE CLINIC | Facility: CLINIC | Age: 59
End: 2024-11-14

## 2024-11-14 DIAGNOSIS — I25.10 CORONARY ARTERY DISEASE DUE TO LIPID RICH PLAQUE: ICD-10-CM

## 2024-11-14 DIAGNOSIS — I25.83 CORONARY ARTERY DISEASE DUE TO LIPID RICH PLAQUE: ICD-10-CM

## 2024-11-14 DIAGNOSIS — I25.10 ATHEROSCLEROSIS OF NATIVE CORONARY ARTERY, UNSPECIFIED WHETHER ANGINA PRESENT, UNSPECIFIED WHETHER NATIVE OR TRANSPLANTED HEART: Primary | ICD-10-CM

## 2024-11-14 NOTE — TELEPHONE ENCOUNTER
To Dr. Samuels-    Referral pended for review     Spoke to [facility] who reports they need referral for Dr. Caruso who is currently being seen right now. Cardopvascular surgeon     Fax [phone]    Dx code I25.10

## 2024-11-14 NOTE — TELEPHONE ENCOUNTER
Aparna from Dr. Reji Cope office called.  Please return their call as they are in need of a referral for this patient.

## 2024-12-12 NOTE — PAT NURSING NOTE
OPEN HEART SURGERY PRE-OPERATIVE PREPARATION  Shower with BETACEPT soap every day for 3 days prior to surgery, including the day of surgery. No powder, lotion, deodorant, or nail polish, or nail polish and do not apply makeup as well on the day of the surgery. On the morning of surgery, remove ALL jewelry and leave all valuables at home. Do not shave chest area.  Nothing to eat or drink after midnight the day before your surgery.  NO ASPIRIN, PLAVIX, OR BLOOD THINNERS.  Please take the following medications with only sips of water the morning of surgery:     levothyroxine 25 MCG:      Please STOP taking the following medications:   STOP ALL VITAMINS/SUPPLEMENTS/NSAIDS 1 WEEK PRIOR TO SURGERY (ergocalciferol/Vitamin D, or any other Vitamins/supplements/ NSAIDS)   Tirzepatide-Weight Management (ZEPBOUND) - Last dose  Monday 12/16/24 - then stopped - DO NOT TAKE AFTER 1/1/25    losartan 50 MG - Last dose 1/3/25    aspirin 81 MG - Last dose 1/7/25    rosuvastatin 40 MG - Last dose 1/7/25    Please arrive at the hospital at 5:30 AM as your surgery is scheduled for 7:00 AM. Park in the BLUE or GREEN parking lot at the [facility]. Complimentary  parking is available at the main entrance Monday - Friday between 7:30am - 4pm. Enter the hospital at the MAIN Entrance and use the elevators within the main entrance lobby to get to the second floor. Check in at the Surgery Reception Desk, which will be on your left when you exit the elevator.  You will be taken to the surgery suite at about 6:45 AM. Once you are taken to the surgery suite, your family will be directed to the critical care lounge area in the Critical Care Unit on the second floor. They are also welcome to visit the retail and lounge spaces on the first floor and the [facility] on the first and second floor near the Critical Care Unit.  Your surgery will last approximately 4-6 hours. Your family will be notified about 30 minutes prior  to completion. Your cardiovascular surgeon will meet with your family once you are transferred out of the surgery suite into a private room where you will recover for the remainder of your hospital visit, which can be expected to last about four to five days.  If you have any special needs, concerns or questions, feel free to call us at [phone] or call Dr. Caruso's office at [phone].  Warmest Regards.  Delphien Chavez, RN, BSN, BS  CV Pre-admission Testing (PAT) RN, Interventional Suites  [address] [address]  P [phone] F [phone]  [email or web address]

## 2024-12-19 ENCOUNTER — TELEPHONE (OUTPATIENT)
Dept: INTERNAL MEDICINE CLINIC | Facility: CLINIC | Age: 59
End: 2024-12-19

## 2024-12-19 NOTE — TELEPHONE ENCOUNTER
Yadira needs a copy of the referral for this patient.  Patient has a consultation appointment on Monday, December 23 with Dr. Alfredito Caruso.

## 2024-12-23 ENCOUNTER — PATIENT MESSAGE (OUTPATIENT)
Dept: INTERNAL MEDICINE CLINIC | Facility: CLINIC | Age: 59
End: 2024-12-23

## 2024-12-23 DIAGNOSIS — H93.8X1 SENSATION OF PLUGGED EAR ON RIGHT SIDE: ICD-10-CM

## 2024-12-23 DIAGNOSIS — E66.9 OBESITY (BMI 30-39.9): ICD-10-CM

## 2024-12-23 DIAGNOSIS — E78.5 HYPERLIPIDEMIA, UNSPECIFIED HYPERLIPIDEMIA TYPE: ICD-10-CM

## 2024-12-23 DIAGNOSIS — K76.0 HEPATIC STEATOSIS: ICD-10-CM

## 2024-12-23 DIAGNOSIS — L98.9 FOOT LESION: Primary | ICD-10-CM

## 2024-12-23 DIAGNOSIS — R93.1 ELEVATED CORONARY ARTERY CALCIUM SCORE: ICD-10-CM

## 2024-12-23 DIAGNOSIS — H61.21 IMPACTED CERUMEN OF RIGHT EAR: ICD-10-CM

## 2024-12-25 RX ORDER — ERGOCALCIFEROL 1.25 MG/1
50000 CAPSULE, LIQUID FILLED ORAL WEEKLY
Qty: 12 CAPSULE | Refills: 0 | Status: SHIPPED | OUTPATIENT
Start: 2024-12-25

## 2024-12-26 DIAGNOSIS — H61.21 IMPACTED CERUMEN OF RIGHT EAR: ICD-10-CM

## 2024-12-26 DIAGNOSIS — K76.0 HEPATIC STEATOSIS: ICD-10-CM

## 2024-12-26 DIAGNOSIS — H93.8X1 SENSATION OF PLUGGED EAR ON RIGHT SIDE: ICD-10-CM

## 2024-12-26 DIAGNOSIS — R93.1 ELEVATED CORONARY ARTERY CALCIUM SCORE: ICD-10-CM

## 2024-12-26 DIAGNOSIS — E78.5 HYPERLIPIDEMIA, UNSPECIFIED HYPERLIPIDEMIA TYPE: ICD-10-CM

## 2024-12-26 DIAGNOSIS — E66.9 OBESITY (BMI 30-39.9): ICD-10-CM

## 2024-12-26 RX ORDER — ERGOCALCIFEROL 1.25 MG/1
CAPSULE, LIQUID FILLED ORAL
Qty: 12 CAPSULE | Refills: 0 | OUTPATIENT
Start: 2024-12-26

## 2025-01-03 ENCOUNTER — ANESTHESIA EVENT (OUTPATIENT)
Dept: SURGERY | Facility: HOSPITAL | Age: 60
End: 2025-01-03
Payer: COMMERCIAL

## 2025-01-03 ENCOUNTER — NURSE ONLY (OUTPATIENT)
Dept: LAB | Facility: HOSPITAL | Age: 60
End: 2025-01-03
Attending: THORACIC SURGERY (CARDIOTHORACIC VASCULAR SURGERY)
Payer: COMMERCIAL

## 2025-01-03 ENCOUNTER — HOSPITAL ENCOUNTER (OUTPATIENT)
Dept: ULTRASOUND IMAGING | Facility: HOSPITAL | Age: 60
Discharge: HOME | End: 2025-01-03
Attending: THORACIC SURGERY (CARDIOTHORACIC VASCULAR SURGERY)
Payer: COMMERCIAL

## 2025-01-03 ENCOUNTER — HOSPITAL ENCOUNTER (OUTPATIENT)
Dept: GENERAL RADIOLOGY | Facility: HOSPITAL | Age: 60
Discharge: HOME | End: 2025-01-03
Attending: THORACIC SURGERY (CARDIOTHORACIC VASCULAR SURGERY)
Payer: COMMERCIAL

## 2025-01-03 ENCOUNTER — HOSPITAL ENCOUNTER (OUTPATIENT)
Dept: INTERVENTIONAL RADIOLOGY/VASCULAR | Facility: HOSPITAL | Age: 60
Discharge: HOME | End: 2025-01-03
Attending: THORACIC SURGERY (CARDIOTHORACIC VASCULAR SURGERY)
Payer: COMMERCIAL

## 2025-01-03 DIAGNOSIS — Z01.818 PRE-OP TESTING: ICD-10-CM

## 2025-01-03 DIAGNOSIS — Z01.818 PREOPERATIVE TESTING: ICD-10-CM

## 2025-01-03 DIAGNOSIS — I25.10 CAD (CORONARY ARTERY DISEASE): ICD-10-CM

## 2025-01-03 DIAGNOSIS — E55.9 VITAMIN D DEFICIENCY: ICD-10-CM

## 2025-01-03 DIAGNOSIS — Z13.1 SCREENING FOR DIABETES MELLITUS (DM): ICD-10-CM

## 2025-01-03 DIAGNOSIS — R42 DIZZINESS AND GIDDINESS: ICD-10-CM

## 2025-01-03 DIAGNOSIS — K76.0 HEPATIC STEATOSIS: ICD-10-CM

## 2025-01-03 LAB
ALBUMIN SERPL-MCNC: 4.6 G/DL (ref 3.2–4.8)
ALBUMIN/GLOB SERPL: 2 {RATIO} (ref 1–2)
ALP LIVER SERPL-CCNC: 48 U/L
ALT SERPL-CCNC: 74 U/L
ANION GAP SERPL CALC-SCNC: 1 MMOL/L (ref 0–18)
ANTIBODY SCREEN: NEGATIVE
APTT PPP: 27.3 SECONDS (ref 23–36)
AST SERPL-CCNC: 32 U/L (ref ?–34)
BASOPHILS # BLD AUTO: 0.03 X10(3) UL (ref 0–0.2)
BASOPHILS NFR BLD AUTO: 0.6 %
BILIRUB SERPL-MCNC: 0.9 MG/DL (ref 0.3–1.2)
BILIRUB UR QL: NEGATIVE
BUN BLD-MCNC: 17 MG/DL (ref 9–23)
BUN/CREAT SERPL: 16.2 (ref 10–20)
CALCIUM BLD-MCNC: 10.1 MG/DL (ref 8.7–10.4)
CHLORIDE SERPL-SCNC: 110 MMOL/L (ref 98–112)
CLARITY UR: CLEAR
CO2 SERPL-SCNC: 31 MMOL/L (ref 21–32)
COLOR UR: YELLOW
CREAT BLD-MCNC: 1.05 MG/DL
DEPRECATED RDW RBC AUTO: 37.7 FL (ref 35.1–46.3)
EGFRCR SERPLBLD CKD-EPI 2021: 62 ML/MIN/1.73M2 (ref 60–?)
EOSINOPHIL # BLD AUTO: 0.09 X10(3) UL (ref 0–0.7)
EOSINOPHIL NFR BLD AUTO: 1.9 %
ERYTHROCYTE [DISTWIDTH] IN BLOOD BY AUTOMATED COUNT: 12.2 % (ref 11–15)
EST. AVERAGE GLUCOSE BLD GHB EST-MCNC: 94 MG/DL (ref 68–126)
FASTING STATUS PATIENT QL REPORTED: YES
GLOBULIN PLAS-MCNC: 2.3 G/DL (ref 2–3.5)
GLUCOSE BLD-MCNC: 103 MG/DL (ref 70–99)
GLUCOSE UR-MCNC: NORMAL MG/DL
HBA1C MFR BLD: 4.9 % (ref ?–5.7)
HCT VFR BLD AUTO: 43 %
HGB BLD-MCNC: 15.1 G/DL
IMM GRANULOCYTES # BLD AUTO: 0.01 X10(3) UL (ref 0–1)
IMM GRANULOCYTES NFR BLD: 0.2 %
INR BLD: 0.94 (ref 0.8–1.2)
KETONES UR-MCNC: NEGATIVE MG/DL
LEUKOCYTE ESTERASE UR QL STRIP.AUTO: NEGATIVE
LYMPHOCYTES # BLD AUTO: 1.6 X10(3) UL (ref 1–4)
LYMPHOCYTES NFR BLD AUTO: 33.1 %
MAGNESIUM SERPL-MCNC: 2 MG/DL (ref 1.6–2.6)
MCH RBC QN AUTO: 30 PG (ref 26–34)
MCHC RBC AUTO-ENTMCNC: 35.1 G/DL (ref 31–37)
MCV RBC AUTO: 85.5 FL
MONOCYTES # BLD AUTO: 0.41 X10(3) UL (ref 0.1–1)
MONOCYTES NFR BLD AUTO: 8.5 %
MRSA DNA SPEC QL NAA+PROBE: NEGATIVE
NEUTROPHILS # BLD AUTO: 2.69 X10 (3) UL (ref 1.5–7.7)
NEUTROPHILS # BLD AUTO: 2.69 X10(3) UL (ref 1.5–7.7)
NEUTROPHILS NFR BLD AUTO: 55.7 %
NITRITE UR QL STRIP.AUTO: NEGATIVE
OSMOLALITY SERPL CALC.SUM OF ELEC: 296 MOSM/KG (ref 275–295)
PH UR: 5.5 [PH] (ref 5–8)
PLATELET # BLD AUTO: 160 10(3)UL (ref 150–450)
POTASSIUM SERPL-SCNC: 4.6 MMOL/L (ref 3.5–5.1)
PROT SERPL-MCNC: 6.9 G/DL (ref 5.7–8.2)
PROT UR-MCNC: NEGATIVE MG/DL
PROTHROMBIN TIME: 13.1 SECONDS (ref 11.6–14.8)
Q-T INTERVAL: 448 MS
QRS DURATION: 84 MS
QTC CALCULATION (BEZET): 404 MS
R AXIS: 8 DEGREES
RBC # BLD AUTO: 5.03 X10(6)UL
RH BLOOD TYPE: POSITIVE
SODIUM SERPL-SCNC: 142 MMOL/L (ref 136–145)
SP GR UR STRIP: 1.03 (ref 1–1.03)
T AXIS: 21 DEGREES
UROBILINOGEN UR STRIP-ACNC: NORMAL
VENTRICULAR RATE: 49 BPM
WBC # BLD AUTO: 4.8 X10(3) UL (ref 4–11)

## 2025-01-03 PROCEDURE — 71046 X-RAY EXAM CHEST 2 VIEWS: CPT | Performed by: THORACIC SURGERY (CARDIOTHORACIC VASCULAR SURGERY)

## 2025-01-03 PROCEDURE — 80053 COMPREHEN METABOLIC PANEL: CPT

## 2025-01-03 PROCEDURE — 86850 RBC ANTIBODY SCREEN: CPT

## 2025-01-03 PROCEDURE — 93005 ELECTROCARDIOGRAM TRACING: CPT

## 2025-01-03 PROCEDURE — 86901 BLOOD TYPING SEROLOGIC RH(D): CPT

## 2025-01-03 PROCEDURE — 83735 ASSAY OF MAGNESIUM: CPT

## 2025-01-03 PROCEDURE — 93880 EXTRACRANIAL BILAT STUDY: CPT | Performed by: THORACIC SURGERY (CARDIOTHORACIC VASCULAR SURGERY)

## 2025-01-03 PROCEDURE — 85610 PROTHROMBIN TIME: CPT

## 2025-01-03 PROCEDURE — 83036 HEMOGLOBIN GLYCOSYLATED A1C: CPT

## 2025-01-03 PROCEDURE — 93010 ELECTROCARDIOGRAM REPORT: CPT | Performed by: INTERNAL MEDICINE

## 2025-01-03 PROCEDURE — 36415 COLL VENOUS BLD VENIPUNCTURE: CPT

## 2025-01-03 PROCEDURE — 85025 COMPLETE CBC W/AUTO DIFF WBC: CPT

## 2025-01-03 PROCEDURE — 81001 URINALYSIS AUTO W/SCOPE: CPT

## 2025-01-03 PROCEDURE — 85730 THROMBOPLASTIN TIME PARTIAL: CPT

## 2025-01-03 PROCEDURE — 87641 MR-STAPH DNA AMP PROBE: CPT

## 2025-01-03 PROCEDURE — 86900 BLOOD TYPING SEROLOGIC ABO: CPT

## 2025-01-03 RX ORDER — FAMOTIDINE 20 MG/1
20 TABLET, FILM COATED ORAL ONCE
Status: CANCELLED | OUTPATIENT
Start: 2025-01-03 | End: 2025-01-03

## 2025-01-03 RX ORDER — FAMOTIDINE 10 MG/ML
20 INJECTION, SOLUTION INTRAVENOUS ONCE
Status: CANCELLED | OUTPATIENT
Start: 2025-01-03

## 2025-01-03 RX ORDER — METOCLOPRAMIDE 10 MG/1
10 TABLET ORAL ONCE
Status: CANCELLED | OUTPATIENT
Start: 2025-01-03 | End: 2025-01-03

## 2025-01-03 RX ORDER — DEXMEDETOMIDINE HYDROCHLORIDE 4 UG/ML
INJECTION, SOLUTION INTRAVENOUS CONTINUOUS
Status: CANCELLED | OUTPATIENT
Start: 2025-01-03

## 2025-01-03 RX ORDER — METOCLOPRAMIDE HYDROCHLORIDE 5 MG/ML
10 INJECTION INTRAMUSCULAR; INTRAVENOUS ONCE
Status: CANCELLED | OUTPATIENT
Start: 2025-01-03

## 2025-01-03 NOTE — CM/SW NOTE
01/03/25 0900   CM/SW Referral Data   Referral Source Physician   Reason for Referral Discharge planning   Informant Patient;Spouse/Significant Other;Friend   Medical Hx   Does patient have an established PCP? Yes  (Dr. Tori Samuels)   Patient Info   Advanced directives? Yes   Patient's Current Mental Status at Time of Assessment Alert;Oriented   Patient's Home Environment House   Number of Levels in Home 1   Number of Stair in Home 1 RADHA   Patient lives with Spouse/Significant other   Patient Status Prior to Admission   Independent with ADLs and Mobility Yes   Discharge Needs   Anticipated D/C needs Home health care   Choice of Post-Acute Provider   Informed patient of right to choose their preferred provider Yes   List of appropriate post-acute services provided to patient/family with quality data No - Declined list   Patient/family choice [facility]     SW was referred to this case for PAT screening.    Pt is scheduled for CABG w/Dr. Caruso 1/8/25 at 7:00 AM.    SW met with pt and her significant other Lam and friend Laureen at the [facility].    SW confirmed address and PCP on file.    Pt is independent at home with mobility and ADLs.  No equipment used.  Pt is employed full-time but has a leave from work for the procedure.    No home O2 use or CPAP.    Pt and Lam live in 1-level home with 1 RADHA.      SW explained Dr. Caruso's preferred [facility] provider is [facility].  Pt agreeable to use them.  Pt aware she will need to remain homebound during phase 1 of cardiac recovery.    HCPOA sent to registration tube #[id number].    PLAN: DC home w/[facility] (referral to be sent post CABG)    / to remain available for support and/or discharge planning.     Danette Pimentel MSW, LSW [id number]

## 2025-01-03 NOTE — PAT NURSING NOTE
Patient is having preadmission teaching for   CORONARY ARTERY BYPASS GRAFT; POSSIBLE ENDOSCOPIC VEIN HARVERST    with Dr. Caruso on date:    1/8/25 .  Pre-op testing performed today with patient. Written and verbal information provided re: jacki operative expectations with all questions answered. Patient, lisa (Lam) and friend (Dee), who came with patient, verbalized understanding. Consents obtained.  Instructions reviewed regarding medications to hold, Betasept showers, NPO after MN before surgery, and time to arrive 5:30 AM on Wednesday, 1/8/25, at Surgery Check-in.      Patient, lisa(Lam), and friend(Dee) verbalized understanding and are both in agreement with treatment plan.    Completed tests reviewed OR PAT RN, and with AROLDO Woodard, as well as pending test results for Carotid US done today.

## 2025-01-03 NOTE — ANESTHESIA PREPROCEDURE EVALUATION
Anesthesia PreOp Note    HPI:     Olimpia Medina is a 59 year old female who presents for preoperative consultation requested by: Alfredito Caruso MD    Date of Surgery: 1/8/2025    Procedure(s):  CORONARY ARTERY BYPASS GRAFT; POSSIBLE ENDOSCOPIC VEIN HARVERST  Indication: Coronary artery disease involving native coronary artery of native heart without angina pectoris [I25.10]    Relevant Problems   No relevant active problems       NPO:                         History Review:  Patient Active Problem List    Diagnosis Date Noted    Hyperlipidemia 11/05/2024    Sensation of plugged ear on right side 11/05/2024    Impacted cerumen of right ear 11/05/2024    Elevated coronary artery calcium score 11/05/2024    Encounter for screening mammogram for malignant neoplasm of breast 06/26/2024    Essential hypertension 06/07/2021    Liver cyst 06/07/2021    Vitamin D deficiency 06/07/2021    Mass of left forearm 08/07/2019    Lipoma of left upper extremity 07/10/2019    Bronchiectasis with acute lower respiratory infection (HCC) 07/10/2019    Hepatic steatosis 07/10/2019    Microscopic hematuria 07/10/2019    Obesity (BMI 30-39.9) 07/10/2019    Mixed hyperlipidemia 07/10/2019    Preoperative testing 08/23/2018    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 Joey Orantes, no treatment    Back problem    Neck pain, into left hand with numbness and tingling    Coronary atherosclerosis    DEPRESSION    Disorder of thyroid    HERPES SIMPLEX    shingles on right upper arm, genital, not oral    High blood pressure    High cholesterol    Hx of motion sickness    HYPERLIPIDEMIA    Low grade mucinous neoplasm of appendix    Migraines    Blurred Vision r/t headache    Migraines    NEUROCARDIOGENIC DISEASE    Other and unspecified hyperlipidemia    Thyroid disease       Past Surgical History:    Procedure Laterality Date    Appendectomy  07/10/2018    Laparoscopic Cecectomy, Drainage of Appendiceal abcess    Appendectomy      Colonoscopy N/A 2018    Procedure: COLONOSCOPY, POSSIBLE BIOPSY, POSSIBLE POLYPECTOMY 77559;  Surgeon: Guillermo Meyer MD;  Location: [facility]    Colonoscopy N/A 2021    Procedure: COLONOSCOPY;  Surgeon: Guillermo Meyer MD;  Location: [facility]    Lasik enhancement - od - right eye Bilateral     Other surgical history  01/10/2011    flex cysto, dr alvarez    Other surgical history  2011    Rt knee torn meniscus repair    Other surgical history Left 2015    Procedure: KNEE ARTHROSCOPY;  Surgeon: Eduardo Hidalgo MD;  Location:  MAIN OR    Other surgical history  2018    robotic right hemicolectomy    Other surgical history      lef t meniscus repair knee       Prescriptions Prior to Admission[1]  Current Medications and Prescriptions Ordered in Epic[2]    Allergies[3]    Family History   Problem Relation Age of Onset    Hypertension Mother     Lipids Mother     Heart Disorder Mother         heavy smoker    Other (PVD) Mother     Diabetes Maternal Grandmother     Hypertension Maternal Grandmother     Lipids Maternal Grandmother     Obesity Maternal Grandmother     Heart Disorder Maternal Grandmother         MI  60's    Cancer Paternal Grandmother         colon cancer 50's, 92    Cancer Paternal Grandfather         bladder ca 60's    Cancer Father         Bladder cancer,  late 60'd    Hypertension Brother     Ovarian Cancer Paternal Cousin Female         under 50     Social History     Socioeconomic History    Marital status:     Number of children: 2   Tobacco Use    Smoking status: Never    Smokeless tobacco: Never   Vaping Use    Vaping status: Never Used   Substance and Sexual Activity    Alcohol use: Yes     Comment: occ    Drug use: No    Sexual activity: Yes     Partners: Male     Birth  control/protection: Vasectomy   Other Topics Concern    Caffeine Concern No    Seat Belt Yes    History of tanning Yes    Reaction to local anesthetic No    Pt has a pacemaker No    Pt has a defibrillator No       Available pre-op labs reviewed.  Lab Results   Component Value Date    WBC 4.8 01/03/2025    RBC 5.03 01/03/2025    HGB 15.1 01/03/2025    HCT 43.0 01/03/2025    MCV 85.5 01/03/2025    MCH 30.0 01/03/2025    MCHC 35.1 01/03/2025    RDW 12.2 01/03/2025    .0 01/03/2025     Lab Results   Component Value Date     01/03/2025    K 4.6 01/03/2025     01/03/2025    CO2 31.0 01/03/2025    BUN 17 01/03/2025    CREATSERUM 1.05 (H) 01/03/2025     (H) 01/03/2025    CA 10.1 01/03/2025     Lab Results   Component Value Date    INR 0.94 01/03/2025       Vital Signs:  Body mass index is 33.97 kg/m².   height is 1.753 m (5' 9\") and weight is 104.3 kg (230 lb).   Vitals:    12/12/24 1057   Weight: 104.3 kg (230 lb)   Height: 1.753 m (5' 9\")        Anesthesia Evaluation     Patient summary reviewed and Nursing notes reviewed    No history of anesthetic complications   Airway   Mallampati: II  TM distance: >3 FB  Neck ROM: full  Dental - Dentition appears grossly intact     Pulmonary - negative ROS and normal exam   Cardiovascular - normal exam  (+) hypertension, CAD    ROS comment: [facility]  Coronary Angiography  · RCA:  Dominant and free of obstructive disease, supplies PDA and PL  · Left main: Distal 60 to 70% stenosis  · Left anterior descending:  Patent and free of obstructive disease, supplies 2 diagonals which are non-obstructive  · Circumflex: Ostial 90% stenosis followed by mid 70 to 80% stenosis, supplies 2 OM branches which are patent  · Ramus intermedius luminal irregularities        Assessment:  · IFR of the LAD and ramus performed both significant 0.84, 0.79 respectively        Recommendations:  · Consult cardiac surgery for bypass evaluation of left coronary system.     TTE  Conclusions:      1. Left ventricle: The cavity size was normal. Wall thickness was normal.      Systolic function was normal. The estimated ejection fraction was 60-65%,      by visual assessment. Wall motion is normal; there are no regional wall      motion abnormalities. Left ventricular diastolic function is      indeterminate.   2. Aortic valve: There was mild regurgitation.   Impressions:  No previous study was available for comparison.       Neuro/Psych - negative ROS     GI/Hepatic/Renal    (+) liver disease (Steatosis, liver cyst)    Endo/Other - negative ROS   Abdominal                  Anesthesia Plan:   ASA:  4  Plan:   General  Monitors and Lines:   A-line, Brain oximetry, Central line, [employer] and JOE  Airway:  ETT  Post-op Pain Management: IV analgesics  Plan Comments: I have informed Olimpia Medina of the nature of the anesthetic plan, benefits, risks, major complications, and any alternative forms of anesthetic management.  she  was informed of the specific risks of anesthesia for cardiac surgery including postoperative ventilation, invasive monitoring, possible JOE, possible transfusion and elevated risk of awareness.  All of the patient's questions were answered to the best of my ability.   The patient desires the anesthetic management as planned.  Informed Consent Plan and Risks Discussed With:  Patient and spouse      I have informed Olimpia Medina and/or legal guardian or family member of the nature of the anesthetic plan, benefits, risks including possible dental damage if relevant, major complications, and any alternative forms of anesthetic management.   All of the patient's questions were answered to the best of my ability. The patient desires the anesthetic management as planned.  Dwight Weinberg MD  1/3/2025 9:51 AM  Present on Admission:  **None**           [1]   No medications prior to admission.   [2]   No current Epic-ordered facility-administered medications on file.     Current  Outpatient Medications Ordered in Epic   Medication Sig Dispense Refill    ergocalciferol 1.25 MG (52352 UT) Oral Cap Take 1 capsule (50,000 Units total) by mouth once a week. 12 capsule 0    Tirzepatide-Weight Management (ZEPBOUND) 5 MG/0.5ML Subcutaneous Solution Inject 5 mg into the skin once a week. (Patient taking differently: Inject 5 mg into the skin once a week. Last dose will be Monday 12/16/24 - then stopped) 6 mL 0    aspirin 81 MG Oral Tab EC Take 1 tablet (81 mg total) by mouth daily.      levothyroxine 25 MCG Oral Tab Take 1 tablet (25 mcg total) by mouth before breakfast.      rosuvastatin 40 MG Oral Tab Take 1 tablet (40 mg total) by mouth nightly. 90 tablet 1    losartan 50 MG Oral Tab Take 1 tablet (50 mg total) by mouth daily. (Patient taking differently: Take 1 tablet (50 mg total) by mouth daily. Last dose 1/5/24) 90 tablet 1    ketoconazole 2 % External Cream Apply to affected area 2 times daily for 4 weeks 60 g 2    Na Sulfate-K Sulfate-Mg Sulf (SUPREP BOWEL PREP KIT) 17.5-3.13-1.6 GM/177ML Oral Solution Take as directed by GI clinic. Okay to substitute for generic. (Patient not taking: Reported on 11/5/2024) 1 each 0   [3]   Allergies  Allergen Reactions    Pcns [Penicillins] HIVES     Patient has been on meropenem recently  Hives, age 24. Has had no exposure since.    Latex      Added based on information entered during case entry, please review and add reactions, type, and severity as needed

## 2025-01-08 ENCOUNTER — ANESTHESIA (OUTPATIENT)
Dept: SURGERY | Facility: HOSPITAL | Age: 60
End: 2025-01-08
Payer: COMMERCIAL

## 2025-01-08 ENCOUNTER — HOSPITAL ENCOUNTER (INPATIENT)
Facility: HOSPITAL | Age: 60
LOS: 4 days | Discharge: HOME HEALTH | End: 2025-01-12
Attending: THORACIC SURGERY (CARDIOTHORACIC VASCULAR SURGERY) | Admitting: THORACIC SURGERY (CARDIOTHORACIC VASCULAR SURGERY)
Payer: COMMERCIAL

## 2025-01-08 ENCOUNTER — APPOINTMENT (OUTPATIENT)
Dept: GENERAL RADIOLOGY | Facility: HOSPITAL | Age: 60
End: 2025-01-08
Attending: CLINICAL NURSE SPECIALIST
Payer: COMMERCIAL

## 2025-01-08 DIAGNOSIS — E55.9 VITAMIN D DEFICIENCY: ICD-10-CM

## 2025-01-08 DIAGNOSIS — Z13.1 SCREENING FOR DIABETES MELLITUS (DM): ICD-10-CM

## 2025-01-08 DIAGNOSIS — Z01.818 PRE-OP TESTING: Primary | ICD-10-CM

## 2025-01-08 DIAGNOSIS — I25.10 CAD (CORONARY ARTERY DISEASE): ICD-10-CM

## 2025-01-08 DIAGNOSIS — Z95.1 S/P CABG (CORONARY ARTERY BYPASS GRAFT): ICD-10-CM

## 2025-01-08 DIAGNOSIS — Z01.818 PREOPERATIVE TESTING: ICD-10-CM

## 2025-01-08 DIAGNOSIS — K76.0 HEPATIC STEATOSIS: ICD-10-CM

## 2025-01-08 DIAGNOSIS — R42 DIZZINESS AND GIDDINESS: ICD-10-CM

## 2025-01-08 LAB
ANION GAP SERPL CALC-SCNC: 7 MMOL/L (ref 0–18)
APTT PPP: 30.1 SECONDS (ref 23–36)
BASE EXCESS BLD CALC-SCNC: -0.2 MMOL/L (ref ?–2)
BASE EXCESS BLD CALC-SCNC: 0.5 MMOL/L (ref ?–2)
BASE EXCESS BLDA CALC-SCNC: -3 MMOL/L (ref ?–30)
BASE EXCESS BLDA CALC-SCNC: -6 MMOL/L (ref ?–30)
BASE EXCESS BLDA CALC-SCNC: 0 MMOL/L (ref ?–30)
BLOOD TYPE BARCODE: 6200
BUN BLD-MCNC: 11 MG/DL (ref 9–23)
BUN/CREAT SERPL: 14.5 (ref 10–20)
CA-I BLD-SCNC: 1.04 MMOL/L (ref 0.95–1.32)
CA-I BLD-SCNC: 1.07 MMOL/L (ref 0.95–1.32)
CA-I BLDA-SCNC: 0.97 MMOL/L (ref 1.12–1.32)
CA-I BLDA-SCNC: 1 MMOL/L (ref 1.12–1.32)
CA-I BLDA-SCNC: 1.05 MMOL/L (ref 1.12–1.32)
CA-I BLDA-SCNC: 1.06 MMOL/L (ref 1.12–1.32)
CA-I BLDA-SCNC: 1.25 MMOL/L (ref 1.12–1.32)
CALCIUM BLD-MCNC: 7.5 MG/DL (ref 8.7–10.4)
CHLORIDE SERPL-SCNC: 113 MMOL/L (ref 98–112)
CO2 BLDA-SCNC: 22 MMOL/L (ref 22–32)
CO2 BLDA-SCNC: 23 MMOL/L (ref 22–32)
CO2 BLDA-SCNC: 24 MMOL/L (ref 22–32)
CO2 BLDA-SCNC: 25 MMOL/L (ref 22–32)
CO2 BLDA-SCNC: 26 MMOL/L (ref 22–32)
CO2 SERPL-SCNC: 26 MMOL/L (ref 21–32)
COHGB MFR BLD: 1.7 % (ref 0–3)
COHGB MFR BLD: 1.8 % (ref 0–3)
CREAT BLD-MCNC: 0.76 MG/DL
DEPRECATED RDW RBC AUTO: 37.5 FL (ref 35.1–46.3)
EGFRCR SERPLBLD CKD-EPI 2021: 91 ML/MIN/1.73M2 (ref 60–?)
ERYTHROCYTE [DISTWIDTH] IN BLOOD BY AUTOMATED COUNT: 12.2 % (ref 11–15)
FIBRINOGEN PPP-MCNC: 132 MG/DL (ref 200–480)
GLUCOSE BLD-MCNC: 162 MG/DL (ref 70–99)
GLUCOSE BLDA-MCNC: 108 MG/DL (ref 70–99)
GLUCOSE BLDA-MCNC: 153 MG/DL (ref 70–99)
GLUCOSE BLDA-MCNC: 165 MG/DL (ref 70–99)
GLUCOSE BLDA-MCNC: 177 MG/DL (ref 70–99)
GLUCOSE BLDA-MCNC: 179 MG/DL (ref 70–99)
GLUCOSE BLDC GLUCOMTR-MCNC: 105 MG/DL (ref 70–99)
GLUCOSE BLDC GLUCOMTR-MCNC: 129 MG/DL (ref 70–99)
GLUCOSE BLDC GLUCOMTR-MCNC: 130 MG/DL (ref 70–99)
GLUCOSE BLDC GLUCOMTR-MCNC: 132 MG/DL (ref 70–99)
GLUCOSE BLDC GLUCOMTR-MCNC: 132 MG/DL (ref 70–99)
GLUCOSE BLDC GLUCOMTR-MCNC: 139 MG/DL (ref 70–99)
GLUCOSE BLDC GLUCOMTR-MCNC: 146 MG/DL (ref 70–99)
GLUCOSE BLDC GLUCOMTR-MCNC: 147 MG/DL (ref 70–99)
GLUCOSE BLDC GLUCOMTR-MCNC: 148 MG/DL (ref 70–99)
GLUCOSE BLDC GLUCOMTR-MCNC: 157 MG/DL (ref 70–99)
GLUCOSE BLDC GLUCOMTR-MCNC: 167 MG/DL (ref 70–99)
GLUCOSE BLDC GLUCOMTR-MCNC: 171 MG/DL (ref 70–99)
GLUCOSE BLDC GLUCOMTR-MCNC: 183 MG/DL (ref 70–99)
HCO3 BLDA-SCNC: 20.7 MEQ/L (ref 22–26)
HCO3 BLDA-SCNC: 22.3 MEQ/L (ref 22–26)
HCO3 BLDA-SCNC: 22.7 MEQ/L (ref 22–26)
HCO3 BLDA-SCNC: 23.3 MEQ/L (ref 22–26)
HCO3 BLDA-SCNC: 24.7 MEQ/L (ref 21–27)
HCO3 BLDA-SCNC: 25.2 MEQ/L (ref 22–26)
HCO3 BLDA-SCNC: 25.3 MEQ/L (ref 21–27)
HCT VFR BLD AUTO: 33 %
HCT VFR BLDA CALC: 23 %
HCT VFR BLDA CALC: 24 %
HCT VFR BLDA CALC: 26 %
HCT VFR BLDA CALC: 30 %
HCT VFR BLDA CALC: 36 %
HGB BLD-MCNC: 11.8 G/DL
HGB BLD-MCNC: 12 G/DL
HGB BLD-MCNC: 13.1 G/DL
INR BLD: 1.22 (ref 0.8–1.2)
ISTAT ACTIVATED CLOTTING TIME: 383 SECONDS (ref 125–137)
ISTAT ACTIVATED CLOTTING TIME: 417 SECONDS (ref 125–137)
ISTAT ACTIVATED CLOTTING TIME: <125 SECONDS (ref 125–137)
ISTAT ACTIVATED CLOTTING TIME: <125 SECONDS (ref 125–137)
ISTAT ACTIVATED CLOTTING TIME: >675 SECONDS (ref 125–137)
LACTATE BLD-SCNC: 2.8 MMOL/L (ref 0.5–2)
LACTATE BLD-SCNC: 2.9 MMOL/L (ref 0.5–2)
MAGNESIUM SERPL-MCNC: 1.9 MG/DL (ref 1.6–2.6)
MCH RBC QN AUTO: 30.4 PG (ref 26–34)
MCHC RBC AUTO-ENTMCNC: 35.8 G/DL (ref 31–37)
MCV RBC AUTO: 85.1 FL
METHGB MFR BLD: 1.2 % SAT (ref 0.4–1.5)
METHGB MFR BLD: 1.5 % SAT (ref 0.4–1.5)
O2 CT BLD-SCNC: 16.4 VOL% (ref 15–23)
O2 CT BLD-SCNC: 17.7 VOL% (ref 15–23)
O2/TOTAL GAS SETTING VFR VENT: 40 %
O2/TOTAL GAS SETTING VFR VENT: 70 %
OSMOLALITY SERPL CALC.SUM OF ELEC: 305 MOSM/KG (ref 275–295)
PCO2 BLDA: 37 MM HG (ref 35–45)
PCO2 BLDA: 37.3 MMHG (ref 35–45)
PCO2 BLDA: 40.9 MMHG (ref 35–45)
PCO2 BLDA: 41 MM HG (ref 35–45)
PCO2 BLDA: 43.9 MMHG (ref 35–45)
PCO2 BLDA: 44.2 MMHG (ref 35–45)
PCO2 BLDA: 45 MMHG (ref 35–45)
PEEP SETTING VENT: 5 CM H2O
PEEP SETTING VENT: 5 CM H2O
PH BLDA: 7.28 [PH] (ref 7.35–7.45)
PH BLDA: 7.32 [PH] (ref 7.35–7.45)
PH BLDA: 7.32 [PH] (ref 7.35–7.45)
PH BLDA: 7.39 [PH] (ref 7.35–7.45)
PH BLDA: 7.39 [PH] (ref 7.35–7.45)
PH BLDA: 7.4 [PH] (ref 7.35–7.45)
PH BLDA: 7.43 [PH] (ref 7.35–7.45)
PLATELET # BLD AUTO: 107 10(3)UL (ref 150–450)
PO2 BLDA: 102 MM HG (ref 80–100)
PO2 BLDA: 117 MM HG (ref 80–100)
PO2 BLDA: 206 MMHG (ref 80–105)
PO2 BLDA: 242 MMHG (ref 80–105)
PO2 BLDA: 258 MMHG (ref 80–105)
PO2 BLDA: 318 MMHG (ref 80–105)
PO2 BLDA: 381 MMHG (ref 80–105)
POTASSIUM BLD-SCNC: 3.5 MMOL/L (ref 3.6–5.1)
POTASSIUM BLD-SCNC: 3.8 MMOL/L (ref 3.6–5.1)
POTASSIUM SERPL-SCNC: 3.6 MMOL/L (ref 3.5–5.1)
PRESSURE SUPPORT SETTING VENT: 5 CM H2O
PRESSURE SUPPORT SETTING VENT: 5 CM H2O
PROTHROMBIN TIME: 16.1 SECONDS (ref 11.6–14.8)
PUNCTURE CHARGE: NO
PUNCTURE CHARGE: NO
RBC # BLD AUTO: 3.88 X10(6)UL
RESP RATE: 12 BPM
SAO2 % BLDA: 100 % (ref 92–100)
SAO2 % BLDA: 98.4 % (ref 94–100)
SAO2 % BLDA: 98.9 % (ref 94–100)
SODIUM BLD-SCNC: 143 MMOL/L (ref 135–145)
SODIUM BLD-SCNC: 144 MMOL/L (ref 135–145)
SODIUM BLDA-SCNC: 136 MMOL/L (ref 136–145)
SODIUM BLDA-SCNC: 140 MMOL/L (ref 136–145)
SODIUM BLDA-SCNC: 142 MMOL/L (ref 136–145)
SODIUM BLDA-SCNC: 144 MMOL/L (ref 136–145)
SODIUM BLDA-SCNC: 145 MMOL/L (ref 136–145)
SODIUM BLDA-SCNC: 3.9 MMOL/L (ref 3.6–5.1)
SODIUM BLDA-SCNC: 4 MMOL/L (ref 3.6–5.1)
SODIUM BLDA-SCNC: 4.7 MMOL/L (ref 3.6–5.1)
SODIUM BLDA-SCNC: 5.2 MMOL/L (ref 3.6–5.1)
SODIUM BLDA-SCNC: 5.6 MMOL/L (ref 3.6–5.1)
SODIUM SERPL-SCNC: 146 MMOL/L (ref 136–145)
SPECIMEN VOL 24H UR: 600 ML
UNIT VOLUME: 179 ML
WBC # BLD AUTO: 7.7 X10(3) UL (ref 4–11)

## 2025-01-08 PROCEDURE — 02100Z9 BYPASS CORONARY ARTERY, ONE ARTERY FROM LEFT INTERNAL MAMMARY, OPEN APPROACH: ICD-10-PCS | Performed by: THORACIC SURGERY (CARDIOTHORACIC VASCULAR SURGERY)

## 2025-01-08 PROCEDURE — 93312 ECHO TRANSESOPHAGEAL: CPT | Performed by: ANESTHESIOLOGY

## 2025-01-08 PROCEDURE — 71045 X-RAY EXAM CHEST 1 VIEW: CPT | Performed by: CLINICAL NURSE SPECIALIST

## 2025-01-08 PROCEDURE — 021009W BYPASS CORONARY ARTERY, ONE ARTERY FROM AORTA WITH AUTOLOGOUS VENOUS TISSUE, OPEN APPROACH: ICD-10-PCS | Performed by: THORACIC SURGERY (CARDIOTHORACIC VASCULAR SURGERY)

## 2025-01-08 PROCEDURE — 5A1221Z PERFORMANCE OF CARDIAC OUTPUT, CONTINUOUS: ICD-10-PCS | Performed by: THORACIC SURGERY (CARDIOTHORACIC VASCULAR SURGERY)

## 2025-01-08 PROCEDURE — 06BQ4ZZ EXCISION OF LEFT SAPHENOUS VEIN, PERCUTANEOUS ENDOSCOPIC APPROACH: ICD-10-PCS | Performed by: THORACIC SURGERY (CARDIOTHORACIC VASCULAR SURGERY)

## 2025-01-08 PROCEDURE — 05HM33Z INSERTION OF INFUSION DEVICE INTO RIGHT INTERNAL JUGULAR VEIN, PERCUTANEOUS APPROACH: ICD-10-PCS | Performed by: HOSPITALIST

## 2025-01-08 PROCEDURE — 99223 1ST HOSP IP/OBS HIGH 75: CPT | Performed by: HOSPITALIST

## 2025-01-08 PROCEDURE — B246ZZ4 ULTRASONOGRAPHY OF RIGHT AND LEFT HEART, TRANSESOPHAGEAL: ICD-10-PCS | Performed by: THORACIC SURGERY (CARDIOTHORACIC VASCULAR SURGERY)

## 2025-01-08 PROCEDURE — 99223 1ST HOSP IP/OBS HIGH 75: CPT | Performed by: INTERNAL MEDICINE

## 2025-01-08 DEVICE — LOCKING SCREW,AXS,SELF-DRILLING
Type: IMPLANTABLE DEVICE | Site: CHEST | Status: FUNCTIONAL
Brand: AXS, SMARTLOCK

## 2025-01-08 DEVICE — STERNALPLATE, X: Type: IMPLANTABLE DEVICE | Site: CHEST | Status: FUNCTIONAL

## 2025-01-08 DEVICE — STERNALPLATE, L: Type: IMPLANTABLE DEVICE | Site: CHEST | Status: FUNCTIONAL

## 2025-01-08 RX ORDER — HYDROCODONE BITARTRATE AND ACETAMINOPHEN 5; 325 MG/1; MG/1
2 TABLET ORAL EVERY 4 HOURS PRN
Status: DISCONTINUED | OUTPATIENT
Start: 2025-01-08 | End: 2025-01-12

## 2025-01-08 RX ORDER — GABAPENTIN 300 MG/1
300 CAPSULE ORAL ONCE
Status: COMPLETED | OUTPATIENT
Start: 2025-01-08 | End: 2025-01-08

## 2025-01-08 RX ORDER — FAMOTIDINE 10 MG/ML
20 INJECTION, SOLUTION INTRAVENOUS ONCE
Status: COMPLETED | OUTPATIENT
Start: 2025-01-08 | End: 2025-01-08

## 2025-01-08 RX ORDER — ALBUMIN HUMAN 50 G/1000ML
12.5 SOLUTION INTRAVENOUS ONCE AS NEEDED
Status: DISPENSED | OUTPATIENT
Start: 2025-01-08 | End: 2025-01-09

## 2025-01-08 RX ORDER — CLOPIDOGREL BISULFATE 75 MG/1
75 TABLET ORAL DAILY
Status: DISCONTINUED | OUTPATIENT
Start: 2025-01-09 | End: 2025-01-12

## 2025-01-08 RX ORDER — SODIUM CHLORIDE 9 MG/ML
INJECTION, SOLUTION INTRAVENOUS CONTINUOUS
Status: DISCONTINUED | OUTPATIENT
Start: 2025-01-08 | End: 2025-01-10

## 2025-01-08 RX ORDER — DEXMEDETOMIDINE HYDROCHLORIDE 4 UG/ML
INJECTION, SOLUTION INTRAVENOUS CONTINUOUS
Status: DISCONTINUED | OUTPATIENT
Start: 2025-01-08 | End: 2025-01-08

## 2025-01-08 RX ORDER — POTASSIUM CHLORIDE 29.8 MG/ML
40 INJECTION INTRAVENOUS AS NEEDED
Status: DISCONTINUED | OUTPATIENT
Start: 2025-01-08 | End: 2025-01-12

## 2025-01-08 RX ORDER — METOPROLOL TARTRATE 25 MG/1
25 TABLET, FILM COATED ORAL
Status: DISCONTINUED | OUTPATIENT
Start: 2025-01-09 | End: 2025-01-12

## 2025-01-08 RX ORDER — ASPIRIN 81 MG/1
81 TABLET ORAL DAILY
Status: DISCONTINUED | OUTPATIENT
Start: 2025-01-09 | End: 2025-01-12

## 2025-01-08 RX ORDER — MAGNESIUM SULFATE 1 G/100ML
1 INJECTION INTRAVENOUS AS NEEDED
Status: DISCONTINUED | OUTPATIENT
Start: 2025-01-08 | End: 2025-01-12

## 2025-01-08 RX ORDER — METOCLOPRAMIDE 10 MG/1
10 TABLET ORAL ONCE
Status: COMPLETED | OUTPATIENT
Start: 2025-01-08 | End: 2025-01-08

## 2025-01-08 RX ORDER — HEPARIN SODIUM 1000 [USP'U]/ML
INJECTION, SOLUTION INTRAVENOUS; SUBCUTANEOUS AS NEEDED
Status: DISCONTINUED | OUTPATIENT
Start: 2025-01-08 | End: 2025-01-08 | Stop reason: SURG

## 2025-01-08 RX ORDER — VANCOMYCIN HYDROCHLORIDE
15 EVERY 12 HOURS
Status: COMPLETED | OUTPATIENT
Start: 2025-01-08 | End: 2025-01-09

## 2025-01-08 RX ORDER — ONDANSETRON 2 MG/ML
4 INJECTION INTRAMUSCULAR; INTRAVENOUS EVERY 6 HOURS PRN
Status: DISCONTINUED | OUTPATIENT
Start: 2025-01-08 | End: 2025-01-12

## 2025-01-08 RX ORDER — NICOTINE POLACRILEX 4 MG
15 LOZENGE BUCCAL
Status: DISCONTINUED | OUTPATIENT
Start: 2025-01-08 | End: 2025-01-12

## 2025-01-08 RX ORDER — DEXMEDETOMIDINE HYDROCHLORIDE 4 UG/ML
INJECTION, SOLUTION INTRAVENOUS CONTINUOUS
Status: DISCONTINUED | OUTPATIENT
Start: 2025-01-08 | End: 2025-01-10

## 2025-01-08 RX ORDER — HYDROCODONE BITARTRATE AND ACETAMINOPHEN 5; 325 MG/1; MG/1
1 TABLET ORAL EVERY 4 HOURS PRN
Status: DISCONTINUED | OUTPATIENT
Start: 2025-01-08 | End: 2025-01-12

## 2025-01-08 RX ORDER — NICOTINE POLACRILEX 4 MG
30 LOZENGE BUCCAL
Status: DISCONTINUED | OUTPATIENT
Start: 2025-01-08 | End: 2025-01-12

## 2025-01-08 RX ORDER — METOCLOPRAMIDE HYDROCHLORIDE 5 MG/ML
10 INJECTION INTRAMUSCULAR; INTRAVENOUS ONCE
Status: COMPLETED | OUTPATIENT
Start: 2025-01-08 | End: 2025-01-08

## 2025-01-08 RX ORDER — ACETAMINOPHEN 325 MG/1
650 TABLET ORAL EVERY 4 HOURS PRN
Status: DISCONTINUED | OUTPATIENT
Start: 2025-01-08 | End: 2025-01-12

## 2025-01-08 RX ORDER — MORPHINE SULFATE 2 MG/ML
2 INJECTION, SOLUTION INTRAMUSCULAR; INTRAVENOUS EVERY 2 HOUR PRN
Status: DISCONTINUED | OUTPATIENT
Start: 2025-01-08 | End: 2025-01-12

## 2025-01-08 RX ORDER — MIDAZOLAM HYDROCHLORIDE 1 MG/ML
INJECTION INTRAMUSCULAR; INTRAVENOUS AS NEEDED
Status: DISCONTINUED | OUTPATIENT
Start: 2025-01-08 | End: 2025-01-08 | Stop reason: SURG

## 2025-01-08 RX ORDER — MAGNESIUM SULFATE HEPTAHYDRATE 40 MG/ML
2 INJECTION, SOLUTION INTRAVENOUS AS NEEDED
Status: DISCONTINUED | OUTPATIENT
Start: 2025-01-08 | End: 2025-01-12

## 2025-01-08 RX ORDER — POLYETHYLENE GLYCOL 3350 17 G/17G
17 POWDER, FOR SOLUTION ORAL DAILY PRN
Status: DISCONTINUED | OUTPATIENT
Start: 2025-01-08 | End: 2025-01-12

## 2025-01-08 RX ORDER — DEXTROSE MONOHYDRATE 25 G/50ML
50 INJECTION, SOLUTION INTRAVENOUS
Status: DISCONTINUED | OUTPATIENT
Start: 2025-01-08 | End: 2025-01-12

## 2025-01-08 RX ORDER — BISACODYL 10 MG
10 SUPPOSITORY, RECTAL RECTAL
Status: DISCONTINUED | OUTPATIENT
Start: 2025-01-08 | End: 2025-01-12

## 2025-01-08 RX ORDER — MORPHINE SULFATE 2 MG/ML
2 INJECTION, SOLUTION INTRAMUSCULAR; INTRAVENOUS ONCE
Status: COMPLETED | OUTPATIENT
Start: 2025-01-08 | End: 2025-01-08

## 2025-01-08 RX ORDER — POTASSIUM CHLORIDE 14.9 MG/ML
20 INJECTION INTRAVENOUS AS NEEDED
Status: DISCONTINUED | OUTPATIENT
Start: 2025-01-08 | End: 2025-01-12

## 2025-01-08 RX ORDER — VANCOMYCIN HYDROCHLORIDE
15 ONCE
Status: COMPLETED | OUTPATIENT
Start: 2025-01-08 | End: 2025-01-08

## 2025-01-08 RX ORDER — DOCUSATE SODIUM 100 MG/1
100 CAPSULE, LIQUID FILLED ORAL 2 TIMES DAILY
Status: DISCONTINUED | OUTPATIENT
Start: 2025-01-09 | End: 2025-01-12

## 2025-01-08 RX ORDER — SENNOSIDES 8.6 MG
8.6 TABLET ORAL 2 TIMES DAILY
Status: DISCONTINUED | OUTPATIENT
Start: 2025-01-09 | End: 2025-01-12

## 2025-01-08 RX ORDER — SODIUM CHLORIDE 9 MG/ML
83 INJECTION, SOLUTION INTRAVENOUS CONTINUOUS
Status: DISCONTINUED | OUTPATIENT
Start: 2025-01-08 | End: 2025-01-10

## 2025-01-08 RX ORDER — VANCOMYCIN HYDROCHLORIDE 1 G/20ML
INJECTION, POWDER, LYOPHILIZED, FOR SOLUTION INTRAVENOUS AS NEEDED
Status: DISCONTINUED | OUTPATIENT
Start: 2025-01-08 | End: 2025-01-08 | Stop reason: HOSPADM

## 2025-01-08 RX ORDER — METOCLOPRAMIDE HYDROCHLORIDE 5 MG/ML
10 INJECTION INTRAMUSCULAR; INTRAVENOUS EVERY 6 HOURS
Status: DISCONTINUED | OUTPATIENT
Start: 2025-01-08 | End: 2025-01-12

## 2025-01-08 RX ORDER — FAMOTIDINE 10 MG/ML
20 INJECTION, SOLUTION INTRAVENOUS 2 TIMES DAILY
Status: DISCONTINUED | OUTPATIENT
Start: 2025-01-08 | End: 2025-01-10

## 2025-01-08 RX ORDER — ROCURONIUM BROMIDE 10 MG/ML
INJECTION, SOLUTION INTRAVENOUS AS NEEDED
Status: DISCONTINUED | OUTPATIENT
Start: 2025-01-08 | End: 2025-01-08 | Stop reason: SURG

## 2025-01-08 RX ORDER — DOBUTAMINE HYDROCHLORIDE 200 MG/100ML
INJECTION INTRAVENOUS CONTINUOUS PRN
Status: DISCONTINUED | OUTPATIENT
Start: 2025-01-08 | End: 2025-01-08

## 2025-01-08 RX ORDER — METOPROLOL TARTRATE 25 MG/1
25 TABLET, FILM COATED ORAL ONCE
Status: DISCONTINUED | OUTPATIENT
Start: 2025-01-08 | End: 2025-01-08 | Stop reason: HOSPADM

## 2025-01-08 RX ORDER — ASCORBIC ACID 500 MG
500 TABLET ORAL 3 TIMES DAILY
Status: DISCONTINUED | OUTPATIENT
Start: 2025-01-09 | End: 2025-01-12

## 2025-01-08 RX ORDER — ACETAMINOPHEN 10 MG/ML
1000 INJECTION, SOLUTION INTRAVENOUS EVERY 8 HOURS
Status: COMPLETED | OUTPATIENT
Start: 2025-01-08 | End: 2025-01-09

## 2025-01-08 RX ORDER — DOXEPIN HYDROCHLORIDE 50 MG/1
1 CAPSULE ORAL DAILY
Status: DISCONTINUED | OUTPATIENT
Start: 2025-01-09 | End: 2025-01-12

## 2025-01-08 RX ORDER — ASCORBIC ACID 500 MG
1000 TABLET ORAL ONCE
Status: COMPLETED | OUTPATIENT
Start: 2025-01-08 | End: 2025-01-08

## 2025-01-08 RX ORDER — MORPHINE SULFATE 4 MG/ML
4 INJECTION, SOLUTION INTRAMUSCULAR; INTRAVENOUS EVERY 2 HOUR PRN
Status: DISCONTINUED | OUTPATIENT
Start: 2025-01-08 | End: 2025-01-10

## 2025-01-08 RX ORDER — FAMOTIDINE 20 MG/1
20 TABLET, FILM COATED ORAL ONCE
Status: COMPLETED | OUTPATIENT
Start: 2025-01-08 | End: 2025-01-08

## 2025-01-08 RX ORDER — DOBUTAMINE HYDROCHLORIDE 200 MG/100ML
INJECTION INTRAVENOUS CONTINUOUS PRN
Status: DISCONTINUED | OUTPATIENT
Start: 2025-01-08 | End: 2025-01-10

## 2025-01-08 RX ORDER — CHLORHEXIDINE GLUCONATE ORAL RINSE 1.2 MG/ML
15 SOLUTION DENTAL 2 TIMES DAILY
Status: DISCONTINUED | OUTPATIENT
Start: 2025-01-08 | End: 2025-01-12

## 2025-01-08 RX ORDER — LORAZEPAM 1 MG/1
1 TABLET ORAL ONCE
Status: COMPLETED | OUTPATIENT
Start: 2025-01-08 | End: 2025-01-08

## 2025-01-08 RX ORDER — NITROGLYCERIN 20 MG/100ML
INJECTION INTRAVENOUS CONTINUOUS PRN
Status: DISCONTINUED | OUTPATIENT
Start: 2025-01-08 | End: 2025-01-10

## 2025-01-08 RX ORDER — DEXTROSE MONOHYDRATE AND SODIUM CHLORIDE 5; .45 G/100ML; G/100ML
INJECTION, SOLUTION INTRAVENOUS CONTINUOUS
Status: DISCONTINUED | OUTPATIENT
Start: 2025-01-08 | End: 2025-01-10

## 2025-01-08 RX ORDER — FAMOTIDINE 20 MG/1
20 TABLET, FILM COATED ORAL 2 TIMES DAILY
Status: DISCONTINUED | OUTPATIENT
Start: 2025-01-08 | End: 2025-01-12

## 2025-01-08 RX ORDER — ENOXAPARIN SODIUM 100 MG/ML
40 INJECTION SUBCUTANEOUS DAILY
Status: DISCONTINUED | OUTPATIENT
Start: 2025-01-09 | End: 2025-01-12

## 2025-01-08 RX ORDER — PROTAMINE SULFATE 10 MG/ML
INJECTION, SOLUTION INTRAVENOUS AS NEEDED
Status: DISCONTINUED | OUTPATIENT
Start: 2025-01-08 | End: 2025-01-08 | Stop reason: SURG

## 2025-01-08 RX ADMIN — ROCURONIUM BROMIDE 50 MG: 10 INJECTION, SOLUTION INTRAVENOUS at 07:11:00

## 2025-01-08 RX ADMIN — MIDAZOLAM HYDROCHLORIDE 2 MG: 1 INJECTION INTRAMUSCULAR; INTRAVENOUS at 10:30:00

## 2025-01-08 RX ADMIN — Medication 50 ML: at 11:19:00

## 2025-01-08 RX ADMIN — MIDAZOLAM HYDROCHLORIDE 2 MG: 1 INJECTION INTRAMUSCULAR; INTRAVENOUS at 09:29:00

## 2025-01-08 RX ADMIN — Medication 50 ML: at 12:03:00

## 2025-01-08 RX ADMIN — MIDAZOLAM HYDROCHLORIDE 2 MG: 1 INJECTION INTRAMUSCULAR; INTRAVENOUS at 07:08:00

## 2025-01-08 RX ADMIN — ROCURONIUM BROMIDE 30 MG: 10 INJECTION, SOLUTION INTRAVENOUS at 10:30:00

## 2025-01-08 RX ADMIN — Medication 50 ML: at 12:30:00

## 2025-01-08 RX ADMIN — PROTAMINE SULFATE 250 MG: 10 INJECTION, SOLUTION INTRAVENOUS at 10:58:00

## 2025-01-08 RX ADMIN — DOBUTAMINE HYDROCHLORIDE 2 MCG/KG/MIN: 200 INJECTION INTRAVENOUS at 10:58:00

## 2025-01-08 RX ADMIN — ROCURONIUM BROMIDE 30 MG: 10 INJECTION, SOLUTION INTRAVENOUS at 09:29:00

## 2025-01-08 RX ADMIN — VANCOMYCIN HYDROCHLORIDE 1500 MG: 1 INJECTION, POWDER, LYOPHILIZED, FOR SOLUTION INTRAVENOUS at 07:00:00

## 2025-01-08 RX ADMIN — HEPARIN SODIUM 42000 UNITS: 1000 INJECTION, SOLUTION INTRAVENOUS; SUBCUTANEOUS at 08:35:00

## 2025-01-08 NOTE — CONSULTS
[facility]  part of [facility]    Report of Consultation    Olimpia Medina Patient Status:  Inpatient    1965 MRN [id number]   Location [facility] 2W/SW Attending Alfredito Caruso MD   Hosp Day # 0 PCP Tori Samuels MD     Date of Admission:  2025    Reason for Consultation:   Post CABG    History of Present Illness:   Patient is a 59-year-old female with past medical history significant for hypertension, BILLINGSLEY who presented for scheduled CABG x 2 earlier today.  Recent calcium CT found to be abnormal with subsequent cardiac cath with significant coronary artery disease seen.  Currently intubated, sedated.  Hemodynamically stable.  Unable to obtain 12 point review of system secondary patient's clinical condition    Past Medical History  Past Medical History:    Arrhythmia    Hx of irregular heartbeat, cardiac workup completed , negative per cardiologist Dr. Joey Orantes, no treatment    Back problem    Neck pain, into left hand with numbness and tingling    Coronary atherosclerosis    DEPRESSION    Disorder of thyroid    HERPES SIMPLEX    shingles on right upper arm, genital, not oral    High blood pressure    High cholesterol    Hx of motion sickness    HYPERLIPIDEMIA    Low grade mucinous neoplasm of appendix    Migraines    Blurred Vision r/t headache    Migraines    NEUROCARDIOGENIC DISEASE    Other and unspecified hyperlipidemia    Thyroid disease       Past Surgical History  Past Surgical History:   Procedure Laterality Date    Appendectomy  07/10/2018    Laparoscopic Cecectomy, Drainage of Appendiceal abcess    Appendectomy      Colonoscopy N/A 2018    Procedure: COLONOSCOPY, POSSIBLE BIOPSY, POSSIBLE POLYPECTOMY 54247;  Surgeon: Guillermo Meyer MD;  Location: [facility]    Colonoscopy N/A 2021    Procedure: COLONOSCOPY;  Surgeon: Guillermo Meyer MD;  Location: [facility]    Lasik enhancement - od - right eye Bilateral      Other surgical history  01/10/2011    kade wolfo, dr alvarez    Other surgical history  2011    Rt knee torn meniscus repair    Other surgical history Left 2015    Procedure: KNEE ARTHROSCOPY;  Surgeon: Eduardo Hidalgo MD;  Location:  MAIN OR    Other surgical history  2018    robotic right hemicolectomy    Other surgical history      lef t meniscus repair knee       Family History  Family History   Problem Relation Age of Onset    Hypertension Mother     Lipids Mother     Heart Disorder Mother         heavy smoker    Other (PVD) Mother     Diabetes Maternal Grandmother     Hypertension Maternal Grandmother     Lipids Maternal Grandmother     Obesity Maternal Grandmother     Heart Disorder Maternal Grandmother         MI  60's    Cancer Paternal Grandmother         colon cancer 50's, 92    Cancer Paternal Grandfather         bladder ca 60's    Cancer Father         Bladder cancer,  late 60'd    Hypertension Brother     Ovarian Cancer Paternal Cousin Female         under 50       Social History  Social History     Socioeconomic History    Marital status:     Number of children: 2   Tobacco Use    Smoking status: Never    Smokeless tobacco: Never   Vaping Use    Vaping status: Never Used   Substance and Sexual Activity    Alcohol use: Yes     Comment: occ    Drug use: No    Sexual activity: Yes     Partners: Male     Birth control/protection: Vasectomy   Other Topics Concern    Caffeine Concern No    Seat Belt Yes    History of tanning Yes    Reaction to local anesthetic No    Pt has a pacemaker No    Pt has a defibrillator No           Current Medications:  Current Facility-Administered Medications   Medication Dose Route Frequency    sodium chloride 0.9% infusion  83 mL/hr Intravenous Continuous    insulin regular human (Novolin R, Humulin R) 100 Units in sodium chloride 0.9% 100 mL standard infusion (100 mL)  1-40 Units/hr Intravenous Continuous    sodium chloride 0.9%  infusion  83 mL/hr Intravenous Continuous    sodium chloride 0.9% infusion   Intravenous Continuous    acetaminophen (Ofirmev) 10 mg/mL infusion premix 1,000 mg  1,000 mg Intravenous Q8H    melatonin tab 3 mg  3 mg Oral Nightly PRN    [START ON 1/9/2025] sennosides (Senokot) tab 8.6 mg  8.6 mg Oral BID    [START ON 1/9/2025] docusate sodium (Colace) cap 100 mg  100 mg Oral BID    polyethylene glycol (PEG 3350) (Miralax) 17 g oral packet 17 g  17 g Oral Daily PRN    bisacodyl (Dulcolax) 10 MG rectal suppository 10 mg  10 mg Rectal Daily PRN    metoclopramide (Reglan) 5 mg/mL injection 10 mg  10 mg Intravenous Q6H    ondansetron (Zofran) 4 MG/2ML injection 4 mg  4 mg Intravenous Q6H PRN    famotidine (Pepcid) tab 20 mg  20 mg Oral BID    Or    famotidine (Pepcid) 20 mg/2mL injection 20 mg  20 mg Intravenous BID    dexmedeTOMIDine in sodium chloride 0.9% (Precedex) 400 mcg/100mL infusion premix  0.2-1.5 mcg/kg/hr Intravenous Continuous    DOBUTamine in dextrose 5% (Dobutrex) 500 mg/250mL infusion premix  2.5-20 mcg/kg/min Intravenous Continuous PRN    nitroGLYCERIN in dextrose 5% 50 mg/250mL infusion premix  5-300 mcg/min Intravenous Continuous PRN    norepinephrine (Levophed) 4 mg/250mL infusion premix  0.5-30 mcg/min Intravenous Continuous PRN    [START ON 1/9/2025] metoprolol tartrate (Lopressor) tab 25 mg  25 mg Oral 2x Daily(Beta Blocker)    clevidipine (Cleviprex) 25 MG/50ML IV infusion  1-6 mg/hr Intravenous Continuous    potassium chloride 20 mEq/100mL IVPB premix 20 mEq  20 mEq Intravenous PRN    Or    potassium chloride 40 mEq/100mL IVPB premix (central line) 40 mEq  40 mEq Intravenous PRN    magnesium sulfate in dextrose 5% 1 g/100mL infusion premix 1 g  1 g Intravenous PRN    magnesium sulfate in sterile water for injection 2 g/50mL IVPB premix 2 g  2 g Intravenous PRN    mupirocin (Bactroban) 2% nasal ointment 1 Application  1 Application Nasal BID    chlorhexidine gluconate (Peridex) 0.12 % oral solution  15 mL  15 mL Mouth/Throat BID    [START ON 1/9/2025] multivitamin (Tab-A-Yoan/Beta Carotene) tab 1 tablet  1 tablet Oral Daily    [START ON 1/9/2025] ascorbic acid (Vitamin C) tab 500 mg  500 mg Oral TID    dextrose 5%-sodium chloride 0.45% infusion   mL/hr Intravenous Continuous    [START ON 1/9/2025] enoxaparin (Lovenox) 40 MG/0.4ML SUBQ injection 40 mg  40 mg Subcutaneous Daily    acetaminophen (Tylenol) tab 650 mg  650 mg Oral Q4H PRN    Or    HYDROcodone-acetaminophen (Norco) 5-325 MG per tab 1 tablet  1 tablet Oral Q4H PRN    Or    HYDROcodone-acetaminophen (Norco) 5-325 MG per tab 2 tablet  2 tablet Oral Q4H PRN    morphINE PF 2 MG/ML injection 2 mg  2 mg Intravenous Q2H PRN    Or    morphINE PF 4 MG/ML injection 4 mg  4 mg Intravenous Q2H PRN    sodium chloride 0.9 % IV bolus 250 mL  250 mL Intravenous PRN    albumin human (Albumin) 5% injection 12.5 g  12.5 g Intravenous Once PRN    [START ON 1/9/2025] clopidogrel (Plavix) tab 75 mg  75 mg Oral Daily    [START ON 1/9/2025] aspirin DR tab 81 mg  81 mg Oral Daily    vancomycin (Vancocin) 1.5 g in sodium chloride 0.9% 250mL IVPB premix  15 mg/kg Intravenous Q12H    And    [START ON 1/9/2025] gentamicin (Garamycin) 400 mg in sodium chloride 0.9% 100 mL IVPB  5 mg/kg (Adjusted) Intravenous Once    glucose (Dex4) 15 GM/59ML oral liquid 15 g  15 g Oral Q15 Min PRN    Or    glucose (Glutose) 40% oral gel 15 g  15 g Oral Q15 Min PRN    Or    glucose-vitamin C (Dex-4) chewable tab 4 tablet  4 tablet Oral Q15 Min PRN    Or    dextrose 50% injection 50 mL  50 mL Intravenous Q15 Min PRN    Or    glucose (Dex4) 15 GM/59ML oral liquid 30 g  30 g Oral Q15 Min PRN    Or    glucose (Glutose) 40% oral gel 30 g  30 g Oral Q15 Min PRN    Or    glucose-vitamin C (Dex-4) chewable tab 8 tablet  8 tablet Oral Q15 Min PRN    insulin regular human (Novolin R, Humulin R) 100 Units in sodium chloride 0.9% 100 mL standard infusion (100 mL)  1-28 Units/hr Intravenous Continuous     propofol (Diprivan) 10 MG/ML injection         Medications Prior to Admission   Medication Sig    aspirin 81 MG Oral Tab EC Take 1 tablet (81 mg total) by mouth daily.    levothyroxine 25 MCG Oral Tab Take 1 tablet (25 mcg total) by mouth before breakfast.    rosuvastatin 40 MG Oral Tab Take 1 tablet (40 mg total) by mouth nightly.    ergocalciferol 1.25 MG (34077 UT) Oral Cap Take 1 capsule (50,000 Units total) by mouth once a week.    Tirzepatide-Weight Management (ZEPBOUND) 5 MG/0.5ML Subcutaneous Solution Inject 5 mg into the skin once a week. (Patient taking differently: Inject 5 mg into the skin once a week. Last dose will be Monday 12/16/24 - then stopped)    losartan 50 MG Oral Tab Take 1 tablet (50 mg total) by mouth daily. (Patient taking differently: Take 1 tablet (50 mg total) by mouth daily. Last dose 1/5/24)    ketoconazole 2 % External Cream Apply to affected area 2 times daily for 4 weeks    Na Sulfate-K Sulfate-Mg Sulf (SUPREP BOWEL PREP KIT) 17.5-3.13-1.6 GM/177ML Oral Solution Take as directed by GI clinic. Okay to substitute for generic. (Patient not taking: Reported on 11/5/2024)       Allergies  Allergies[1]    Review of Systems:   Unable to obtain secondary to patient's current clinical condition        Physical Exam:   Blood pressure 121/65, pulse 64, temperature 97.7 °F (36.5 °C), temperature source Pulmonary Artery, resp. rate 15, height 5' 9\" (1.753 m), weight 232 lb (105.2 kg), last menstrual period 01/15/2016, SpO2 97%, not currently breastfeeding.    Constitutional: no acute distress, intubated, sedated  Eyes: PERRL  ENT: nares patent  Neck: neck supple, no JVD  Cardio: RRR, S1 S2  Respiratory: clear to auscultation bilaterally, no wheezing, rales, rhonchi, crackles  GI: abdomen soft, non tender, active bowel souds, no organomegaly  Extremities: no clubbing, cyanosis, edema  Neurologic: no gross motor deficits  Skin: warm, dry    Results:   Laboratory Data  Lab Results   Component Value  Date    WBC 7.7 01/08/2025    HGB 11.8 (L) 01/08/2025    HCT 33.0 (L) 01/08/2025    .0 (L) 01/08/2025    CREATSERUM 0.76 01/08/2025    BUN 11 01/08/2025     (H) 01/08/2025    K 3.6 01/08/2025     (H) 01/08/2025    CO2 26.0 01/08/2025     (H) 01/08/2025    CA 7.5 (L) 01/08/2025    ALB 4.6 01/03/2025    ALKPHO 48 01/03/2025    TP 6.9 01/03/2025    AST 32 01/03/2025    ALT 74 (H) 01/03/2025    PTT 30.1 01/08/2025    INR 1.22 (H) 01/08/2025    PTP 16.1 (H) 01/08/2025    T4F 0.9 01/11/2023    TSH 4.252 06/28/2024    ESRML 17 01/22/2020    MG 1.9 01/08/2025    PHOS 3.7 08/25/2018    B12 512 01/22/2020         Imaging  XR CHEST AP PORTABLE  (CPT=71045)    Result Date: 1/8/2025  CONCLUSION:  1. ET tube is high in position with the tip about 6 cm above the norma. 2. Swans Lucy catheter at junction between RVOT and main pulmonary artery pointing towards right. 3. Cardiomegaly with mild pulmonary venous congestion. 4. Chest tubes in place.  No pneumothorax.    Dictated by (CST): Kash Rivas MD on 1/08/2025 at 2:05 PM     Finalized by (CST): Kash Rivas MD on 1/08/2025 at 2:07 PM           Assessment   1.  POD #0 status post CABG x 2  2.  Coronary artery disease  3.  Hypertension  4.  BILLINGSLEY  5.  Hyperlipidemia    Plan   -Patient presents for scheduled CABG x 2 on 1/8/2025.  Recent abnormal calcium CT with subsequent cardiac cath with significant coronary artery disease seen.  -Postoperative chest x-ray reviewed with stable findings  -Ventilator weaning per protocol  -Aspirin, Plavix, beta-blocker, statin  -Close hemodynamic monitoring  -Insulin gtt.  -Monitor chest tube output  -DVT prophylaxis: SCDs, Lovenox  -Reviewed vitals, labs and imaging    Rajinder Heath DO  Pulmonary Critical Care Medicine  [facility]  1/8/2025  4:41 PM        [1]   Allergies  Allergen Reactions    Pcns [Penicillins] HIVES     Patient has been on meropenem recently  Hives, age 24. Has had no exposure since.     Latex UNKNOWN     Added based on information entered during case entry, please review and add reactions, type, and severity as needed  Per pt. Denies latex allergy

## 2025-01-08 NOTE — ANESTHESIA POSTPROCEDURE EVALUATION
Patient: Olimpia Medina    Procedure Summary       Date: 01/08/25 Room / Location: [facility] MAIN OR  / [facility] MAIN OR    Anesthesia Start: 0706 Anesthesia Stop: 1240    Procedure: CORONARY ARTERY BYPASS GRAFT X2; UTILIZING THE LEFT INTERNAL MAMMARY ARTERY TO THE LAD; SVG TO THE RAMUS; LEFT LEG ENDOSCOPIC GREATER SAPHENOUS VEIN GRAFT HARVEST; INTRAOPERATIVE TRANSESOPHAGEAL ECHOCARDIOGRAM; INSERTION OF TEMPORARY VENTRICULAR PACING WIRE; Diagnosis:       Coronary artery disease involving native coronary artery of native heart without angina pectoris      (Coronary artery disease involving native coronary artery of native heart without angina pectoris [I25.10])    Surgeons: Alfredito Caruso MD Anesthesiologist: Abdoulaye Aguiar MD    Anesthesia Type: general ASA Status: 4            Anesthesia Type: general    Vitals Value Taken Time   /55 01/08/25 1240   Temp 97.4 °F (36.3 °C) 01/08/25 1230   Pulse 63 01/08/25 1251   Resp 13 01/08/25 1251   SpO2 100 % 01/08/25 1251   Vitals shown include unfiled device data.    [facility] AN Post Evaluation:   Patient Evaluated in ICU  Patient Participation: complete - patient participated  Level of Consciousness: Post-procedure mental status: intubated and sedated.  Airway Patency:patent  Dental exam unchanged from preop  Yes    Nausea/Vomiting: none  Cardiovascular Status: acceptable, blood pressure returned to baseline and hemodynamically stable  Respiratory Status: acceptable, unassisted, ETT and ventilator  Postoperative Hydration stable      Abdoulaye Aguiar MD  1/8/2025 12:51 PM

## 2025-01-08 NOTE — HISTORICAL OFFICE NOTE
Continuity of Care Document  11/18/2024  Olimpia Medina - 59 y.o. Female; born [birth year]Summary of episode note, generated on Dec. 23, 2024December 23, 2024   CHIEF COMPLAINT    CHIEF COMPLAINT  Reason for Visit/Chief Complaint   f/u   59-year-old female presents for follow-up visit. Referred for elevated coronary calcium score 1500. Mostly sedentary extensive family history premature CAD Brother, Sister and parents. Chronic dyspnea on exertion no syncope no orthopnea reported no palpitations. Underwent left heart catheterization found to have significant stenosis distal left main 70% ostial 90% circumflex. RCA free of occlusive disease. Seen by Dr. Caruso who planned to perform bypass in December.     PROBLEMS  Reconcile with Patient's ChartPROBLEMS  Problem Effective Dates Date resolved Problem Status   Palpitations, [SNOMED-CT: 46030719] 9/30/2024 - Active   Hyperlipidemia, mixed, [SNOMED-CT: 508128691] 9/30/2024 - Active   Hypertension (HTN), primary, [SNOMED-CT: 25620879] 9/30/2024 - Active     ENCOUNTER    ENCOUNTER  Problem Effective Dates Date resolved Problem Status   Calcium score coronary artery greater than 400, [SNOMED-CT: 307784157] 9/30/2024 - Active   Hyperlipidemia, mixed, [SNOMED-CT: 032064924] 9/30/2024 - Active   Hypertension (HTN), primary, [SNOMED-CT: 15204309] 9/30/2024 - Active     VITAL SIGNS    VITAL SIGNS  Date / Time: 11/19/2024   BP Systolic 122 mmHg   BP Diastolic 70 mmHg   Height 69 inches   Weight 240 lbs   Pulse Rate 59 bpm   BSA (Body Surface Area) 2.3 cc/m2   BMI (Body Mass Index) 35.4 cc/m2   Blood Pressure 122 / 70 mmHg     PHYSICAL EXAMINATION    PHYSICAL EXAMINATION  Header Details   Constitutional o2sat 98%   Vitals Left Arm Sitting  / 70 mmHg, Pulse rate 59 bpm, Regular, Height in 5' 9\", BMI: 35.4, Weight in 240.3 lbs (or) 109 kgs, BSA : 2.34 cc/m²   Head/Eyes/Ears/Nose/Mouth/Throat Sclera Clear, EOMI, PERRLA, No pallor   Respiratory Lungs clear with  normal breath sounds, Equal bilaterally   Cardiovascular    Gastrointestinal Abdomen soft, Non-tender, Normoactive bowel sounds, No masses   Upper Extremities No cyanosis, No edema   Lower Extremities Pulses 2+ and equal bilaterally, No edema   Skin Warm and dry     ALLERGIES, ADVERSE REACTIONS, ALERTS  Reconcile with Patient's ChartALLERGIES, ADVERSE REACTIONS, ALERTS  Type Substance Reaction Severity Status   Allergies Penicillins [Snomed:2595244], [SNOMED: 9201524] Hives [Snomed:573004577] Moderate Active     MEDICATIONS ADMINISTERED DURING VISIT    No data available    MEDICATIONS    MEDICATIONS  Medication Start Date Route/Frequency Status   aspirin 81 mg tablet,delayed release, [RxNorm: 097313] 9/30/2024 Take 1 tablet orally once a day. Active   levothyroxine 25 mcg tablet, [RxNorm: 826254] 9/27/2024 Take 1 tablet (25 mcg total) by mouth before breakfast. Active   losartan 50 mg tablet, [RxNorm: 154486] 9/27/2024 Take 1 tablet orally once a day. Active   rosuvastatin 40 mg tablet, [RxNorm: 260577] 10/28/2024 Take 1 tablet orally once a day. Active   Vitamin D2 1,250 mcg (50,000 unit) capsule, [RxNorm: 0733383] 11/19/2024 Take 1 capsule orally once a week Active     ASSESSMENT    1. Elevated coronary calcium score continue aspirin 81 mg to the med list. Distal left main disease awaiting bypass surgery  2. Hyperlipidemia on rosuvastatin  increased to 40 mg repeat lipid panel in 2 to 3 months..  3. Hypertension  Well-controlled losartan 50 mg daily continue. Low-fat diet.  4. Hypothyroidism on Synthroid management as per primary  5. Aortic regurgitation mild on echocardiogram 8/24Plan  Follow-up as needed after bypass surgeryIncreased BMI: Provide patient with information regarding diet and lifestyle changes.     FAMILY HISTORY    FAMILY HISTORY  Relationship Age Diagnosis   Mother 0 Heart problem     GENERAL STATUS    No data available    PAST MEDICAL HISTORY    PAST MEDICAL HISTORY  Problem Date  diagonsed Date resolved Status   Palpitations, [SNOMED-CT: 26877300] 9/30/2024 - Active   Hyperlipidemia, mixed, [SNOMED-CT: 852592388] 9/30/2024 - Active   Hypertension (HTN), primary, [SNOMED-CT: 70820429] 9/30/2024 - Active     HISTORY OF PRESENT ILLNESS    59-year-old female presents for follow-up visit. Referred for elevated coronary calcium score 1500. Mostly sedentary extensive family history premature CAD Brother, Sister and parents. Chronic dyspnea on exertion no syncope no orthopnea reported no palpitations. Underwent left heart catheterization found to have significant stenosis distal left main 70% ostial 90% circumflex. RCA free of occlusive disease. Seen by Dr. Caruso who planned to perform bypass in December.     IMMUNIZATIONS    No data available    PLAN OF CARE    No data available    PROCEDURES    No data available    RESULTS    RESULTS  Name Result Date Location - Ordered By   GLUCOSE [LOINC: 2339-0] 104 mg/dL [High] 10/30/2024 08:39:00 AM [facility] ([facility])  Address: [address] [facility]  [address]  tel:   SODIUM [LOINC: 2951-2] 143 mmol/L 10/30/2024 08:39:00 AM [facility] ([facility])  Address: [address] [facility]  [address]  tel:   POTASSIUM [LOINC: 2823-3] 4.4 mmol/L 10/30/2024 08:39:00 AM [facility] ([facility])  Address: [address] [facility]  [address]  tel:   CHLORIDE [LOINC: 2075-0] 108 mmol/L 10/30/2024 08:39:00 AM [facility] ([facility])  Address: [address]  [facility]  [address]  tel:   CO2 [LOINC: 2028-9] 30.0 mmol/L 10/30/2024 08:39:00 AM [facility] ([facility])  Address: [address] [facility]  [address]  tel:   ANION GAP [LOINC: 33037-3] 5 mmol/L 10/30/2024 08:39:00 AM [facility] ([facility])  Address: [address] [facility]  [address]  tel:   BUN [LOINC: 6299-2] 16 mg/dL 10/30/2024  08:39:00 AM [facility] ([facility])  Address: [address]  [facility]  [address]  tel:   CREATININE [LOINC: 70158-7] 0.88 mg/dL 10/30/2024 08:39:00 AM [facility] ([facility])  Address: [address]  [facility]  [address]  tel:   BUN/ CREAT RATIO [LOINC: 3097-3] 18.2 10/30/2024 08:39:00 AM [facility] ([facility])  Address: [address]  [facility]  [address]  tel:   CALCIUM [LOINC: 33202-8] 9.7 mg/dL 10/30/2024 08:39:00 AM [facility] ([facility])  Address: [address]  [facility]  [address]  tel:   OSMOLALITY CALCULATED [LOINC: 97846-2] 297 mOsm/kg [High] 10/30/2024 08:39:00 AM [facility] ([facility])  Address: [address]  [facility]  [address]  tel:   E GFR CR [LOINC: 63791-7] 77 mL/min/1.73m2 10/30/2024 08:39:00 AM [facility] ([facility])  Address: [address]  [facility]  [address]  tel:   FASTING PATIENT BMP ANSWER [LOINC: 54558-5] Yes 10/30/2024 08:39:00 AM [facility] ([facility])  Address: [address]  [facility]  [address]  tel:   WBC [LOINC: 6690-2] 5.3 x10(3) uL 10/30/2024 08:39:00 AM [facility] ([facility])  Address: [address]  [facility]  [address]  tel:   RED BLOOD COUNT [LOINC: 789-8] 5.07 x10(6)uL 10/30/2024 08:39:00 AM [facility] ([facility])  Address: [address]  [facility]  [address]  tel:   HGB [LOINC: 718-7] 15.5 g/dL 10/30/2024 08:39:00 AM [facility] ([facility])  Address: [address]  [facility]  [address]  tel:   HCT [LOINC: 4544-3] 43.8 % 10/30/2024 08:39:00 AM [facility] ([facility])  Address: [address]  [facility]  [address]  tel:   MEAN CELL VOLUME [LOINC: 787-2] 86.4 fL 10/30/2024 08:39:00 AM [facility] ([facility])  Address: [address]  [facility]  [address]  tel:   MEAN CORPUSCULAR HEMOGLOBIN [LOINC: 785-6] 30.6 pg 10/30/2024 08:39:00 AM [facility] ([facility])  Address: [address]  [facility]  [address]  tel:   MEAN CORPUSCULAR HGB CONC [LOINC: 786-4] 35.4 g/dL 10/30/2024 08:39:00 AM [facility] ([facility])  Address: [address]  [facility]  [address]  tel:   RED CELL DISTRIBUTION WIDTH-SD [LOINC: 35051-7] 38.3 fL 10/30/2024 08:39:00 AM [facility] ([facility])  Address: [address]  [facility]  [address]  tel:   RED CELL DISTRIBUTION WIDTH CV [LOINC: 788-0] 12.2 % 10/30/2024 08:39:00 AM [facility] ([facility])  Address: [address]  [facility]  [address]  tel:   PLATELETS [LOINC: 777-3] 173.0 10(3)uL 10/30/2024 08:39:00 AM [facility] ([facility])  Address: [address]  [facility]  [address]  tel:   NEUTROPHIL ABS PRELIM [LOINC: 751-8] 2.73 x10 (3) uL 10/30/2024 08:39:00 AM [facility] ([facility])  Address: [address]  [facility]  [address]  tel:   NEUTROPHIL ABSOLUTE [LOINC: 751-8] 2.73 x10(3) uL 10/30/2024 08:39:00 AM [facility] ([facility])  Address: [address]  [facility]  [address]  tel:   LYMPHOCYTE ABSOLUTE [LOINC: 731-0] 1.90 x10(3) uL 10/30/2024 08:39:00 AM [facility] ([facility])  Address: [address]  [facility]  [address]  tel:   MONOCYTE ABSOLUTE [LOINC: 742-7] 0.46 x10(3) uL 10/30/2024 08:39:00 AM [facility] ([facility])  Address: [address]  [facility]  [address]  tel:   EOSINOPHIL ABSOLUTE [LOINC: 711-2] 0.12 x10(3)  10/30/2024 08:39:00 AM [facility] ([facility])  Address: [address]  [facility]  [address]  tel:   BASOPHIL ABSOLUTE [LOINC: 704-7] 0.05 x10(3)  10/30/2024 08:39:00 AM [facility] ([facility])  Address: [address]  [facility]  [address]  tel:   IMMATURE GRANULOCYTE COUNT [LOINC: 54278-4] 0.01 x10(3) uL 10/30/2024 08:39:00 AM [facility] ([facility])  Address: [address]  [facility]  [address]  tel:   NEUTROPHIL % [LOINC: 770-8] 51.8 % 10/30/2024 08:39:00 AM [facility] ([facility])  Address: [address]  [facility]  [address]  tel:   LYMPHOCYTE % [LOINC: 736-9] 36.1 % 10/30/2024 08:39:00 AM [facility] ([facility])  Address: [address]  [facility]  [address]  tel:   MONOCYTE % [LOINC: 5905-5] 8.7 % 10/30/2024 08:39:00 AM [facility] ([facility])  Address: [address]  [facility]  [address]  tel:   EOSINOPHIL % [LOINC: 713-8] 2.3 % 10/30/2024 08:39:00 AM [facility] ([facility])  Address: [address]  [facility]  [address]  tel:   BASOPHIL % [LOINC: 706-2] 0.9 % 10/30/2024 08:39:00 AM [facility] ([facility])  Address: [address]  [facility]  [address]  tel:   IMMATURE GRANULOCYTE RATIO % [LOINC: 19975-3] 0.2 % 10/30/2024 08:39:00 AM [facility] ([facility])  Address: [address]  [facility]  [address]  tel:   Myocardial Perfusion Imaging 1.Stress EKG is abnormal with 1 mm ST depression.2.Maximal exercise treadmill test (MPHR : 85 %).3.The functional capacity is good (9.8 METs).4.Frequent atrial ectopy near peak; rare PVC with isolated ventricular couplet early in recovery.5.Hypertensive response to exercise.6.No chest discomfort reported.1.This is an abnormal perfusion study. 2.This scan is suggestive of low to moderate risk for future cardiovascular events. 3.The left ventricular cavity is noted to be mildly enlarged on the stress study. The left ventricular ejection fraction was calculated to be 64% and left ventricular global function is normal. 4.The study quality is average. 5.Small  size mild intensity fixed apical defect. 10/16/2024 12:00:00 PM Evan Mallory MD   Holter Monitoring 1.This is an excellent quality study. 2.Predominant rhythm is normal sinus rhythm. 3.The minimum heart rate recorded was 30 beats / minute (10/17/2024). The maximum heart rate is 146 beats / minute (10/18/2024). The mean heart rate is 54 beats / minute. 4.No evidence of AV block is noted. 5.Rare premature atrial contractions noted. 6.No evidence of atrial fibrillation noted. 7.Rare premature ventricular contractions noted. 8.No evidence of ventricular tachycardia is noted.9.No evidence of ventricular arrhythmia is noted.10.No pauses were noted. 11.*The predominant rhythm was sinus. *The Maximum Heart Rate recorded was 146 bpm, 10/18 00:33:08, the Minimum Heart Rate recorded was 30 bpm, 10/17 05:48:40, and the Average Heart Rate was 54 bpm. *There were 46 VE beats with a burden of <1 %. *There were 386 SVE beats with a burden of <1 %. There were 11 occurrences of Supraventricular Tachycardia with the Fastest episode 146 bpm, 10/18 00:33:05, and the Longest episode 17 beats, 10/18 00:33:05. *There were 0 Patient Triggers. 10/16/2024 3:00:00 PM Evan Mallory MD     REVIEW OF SYSTEMS    REVIEW OF SYSTEMS  Header Details   Constitutional No history of Anorexia   Eyes No history of Blurry vision   ENT No history of Bloody nose (epistaxis)   Gastrointestinal No history of Abdominal pain   Cardiovascular No history of Chest pain, Palpitations, Edema   Respiratory No history of SOB   Neurological No history of Numbness, Limb weakness   Endocrine No history of Polyuria, Polydyspia     SOCIAL HISTORY    SOCIAL HISTORY  Social History Element Description Effective Dates   Smoking status Never smoked -     FUNCTIONAL STATUS    No data available    INSTRUCTIONS    INSTRUCTIONS  NAME TYPE DATE   Increased BMI: Provide patient with information regarding diet and lifestyle changes. Goals 11/19/2024     MEDICAL EQUIPMENT    No data  available    Goals Sections    No data available    REASON FOR REFERRAL    No data available    Health Concerns Section    No data available    COGNITIVE/MENTAL STATUS    No data available    Patient Demographics    Patient Demographics  Patient Address Patient Name Communication   [address] [place] [address] Olimpia Medina [phone] (Home)     Patient Demographics  Language Race / Ethnicity Marital Status   English - Spoken (Preferred) White / Not  or       Document Information    Primary Care Provider Other Service Providers Document Coverage Dates   Evan Mallory  NPI: [id number]  [phone] (Work)  [address], [address]  [place] [address]  [place] [address]  [employer] [employer]  [facility]  [phone] (Work)  [address] [place] [address] Shagufta Ibanez  NPI: [id number]  [phone] (Work)  [address] [place] [address], [address]  [place] [address]  [place] [address]  Nurses Nov. 19, 2024November 19, 2024      Organization   [facility]  [phone] (Work)  [address] [place] [address], [address]  [place] [address]  [place] [address]     Encounter Providers Encounter Date    Nov. 19, 2024November 19, 2024     Legal Authenticator    Evan Mallory  NPI: [id number]  [phone] (Work)  [address] [place] [address], [address]  [place] [address]  [place] [address]

## 2025-01-08 NOTE — ANESTHESIA PROCEDURE NOTES
Central Line    Date/Time: 1/8/2025 7:10 AM    Performed by: Abdoulaye Aguiar MD  Authorized by: Abdoulaye Aguiar MD    General Information and Staff    Procedure Start:  1/8/2025 7:10 AM  Procedure End:  1/8/2025 7:16 AM  Anesthesiologist:  Abdoulaye Aguiar MD  Performed by:  Anesthesiologist  Patient Location:  OR  Indication: central venous access and CVP monitoring    Site Identification: real time ultrasound guided        Procedure Detail    Patient Position:  Trendelenburg  Laterality:  Right  Site:  Internal jugular  Prep:  Chloraprep  Catheter Size:  9 Fr  Catheter Type:  MAC introducer  Number of Lumens:  Triple lumen  Procedure Detail: target vein identified, needle advanced into vein and blood aspirated and guidewire advanced into vein    Seldinger Technique?: Yes    Intravenous Verification: verified by ultrasound and venous blood return    Post Insertion: all ports aspirated, all ports flushed easily, guidewire was removed intact, line was sutured in place and dressing was applied      Assessment    Events: patient tolerated procedure well with no complications      PA Catheter Placement    PA Catheter Placed?: Yes    PA Catheter Type:  Oximetric  PA Catheter Size:  8  Laterality:  Right  Site:  Internal jugular  Placement Confirmation: pressure tracing changes and verified by JOE    Events: patient tolerated procedure well with no complications      Additional Comments

## 2025-01-08 NOTE — ANESTHESIA PROCEDURE NOTES
Arterial Line    Date/Time: 1/8/2025 7:07 AM    Performed by: Abdoulaye Aguiar MD  Authorized by: Abdoulaye Aguiar MD    General Information and Staff    Procedure Start:  1/8/2025 7:07 AM  Procedure End:  1/8/2025 7:09 AM  Anesthesiologist:  Abdoulaye Aguiar MD  Performed By:  Anesthesiologist  Patient Location:  OR  Indication: continuous blood pressure monitoring and blood sampling needed    Site Identification: surface landmarks    Preanesthetic Checklist: 2 patient identifiers, IV checked, risks and benefits discussed, monitors and equipment checked, pre-op evaluation, timeout performed, anesthesia consent and sterile technique used    Procedure Details    Catheter Size:  20 G  Catheter Length:  1 and 3/4 inch  Catheter Type:  Arrow  Seldinger Technique?: Yes    Laterality:  Right  Site:  Radial artery  Site Prep: chlorhexidine    Line Secured:  Wrist Brace, tape and Tegaderm    Assessment    Events: patient tolerated procedure well with no complications      Medications  1/8/2025 7:07 AM      Additional Comments

## 2025-01-08 NOTE — ANESTHESIA PROCEDURE NOTES
Airway  Date/Time: 1/8/2025 7:10 AM  Urgency: Elective      General Information and Staff    Patient location during procedure: OR  Anesthesiologist: Abdoulaye Aguiar MD  Performed: anesthesiologist   Performed by: Abdoulaye Aguiar MD  Authorized by: Abdoulaye Aguiar MD      Indications and Patient Condition  Indications for airway management: anesthesia  Sedation level: deep  Preoxygenated: yes  Patient position: sniffing  Mask difficulty assessment: 1 - vent by mask    Final Airway Details  Final airway type: endotracheal airway      Successful airway: ETT  Cuffed: yes   Successful intubation technique: direct laryngoscopy  Endotracheal tube insertion site: oral  Blade: Rayray  Blade size: #4  ETT size (mm): 7.0    Cormack-Lehane Classification: grade IIA - partial view of glottis  Placement verified by: capnometry   Measured from: teeth  Number of attempts at approach: 1

## 2025-01-08 NOTE — CM/SW NOTE
Department  notified of request for HHC, aidin referrals started. Assigned CM/SW to follow up with pt/family on further discharge planning.     Rita Isidro, DSC

## 2025-01-08 NOTE — ANESTHESIA PROCEDURE NOTES
Procedure Performed: JOE       Start Time:  1/8/2025 7:17 AM       End Time:   1/8/2025 7:23 AM    Preanesthesia Checklist:  Patient identified, IV assessed, risks and benefits discussed, monitors and equipment assessed, procedure being performed at surgeon's request and anesthesia consent obtained.    General Procedure Information  Diagnostic Indications for Echo:  assessment of ascending aorta and hemodynamic monitoring  Physician Requesting Echo: Alfredito Caruso MD  Location performed:  OR  Intubated  Bite block placed  Heart visualized  Probe Insertion:  Easy  Probe Type:  Biplane  Modalities:  2D only and color flow mapping    Echocardiographic and Doppler Measurements    Ventricles    Right Ventricle:  Cavity size normal.  Hypertrophy not present.  Thrombus not present.  Global function normal.    Left Ventricle:  Cavity size normal.  Hypertrophy not present.  Thrombus not present.  Global Function normal.      Ventricular Regional Function:  1- Basal Anteroseptal:  normal  2- Basal Anterior:  normal  3- Basal Anterolateral:  normal  4- Basal Inferolateral:  normal  5- Basal Inferior:  normal  6- Basal Inferoseptal:  normal  7- Mid Anteroseptal:  normal  8- Mid Anterior:  normal  9- Mid Anterolateral:  normal  10- Mid Inferolateral:  normal  11- Mid Inferior:  normal  12- Mid Inferoseptal:  normal  13- Apical Anterior:  normal  14- Apical Lateral:  normal  15- Apical Inferior:  normal  16- Apical Septal:  normal      Valves    Aortic Valve:  Annulus normal.  Stenosis not present.  Regurgitation absent.  Leaflets normal.  Leaflet motions normal.      Mitral Valve:  Annulus normal.  Stenosis not present.  Regurgitation +1.  Leaflets normal.  Leaflet motions normal.      Tricuspid Valve:  Annulus normal.  Stenosis not present.  Regurgitation absent.  Leaflets normal.  Leaflet motions normal.    Pulmonic Valve:  Annulus normal.  Stenosis not present.        Aorta    Ascending Aorta:  Size normal.  Dissection not  present.  Plaque thickness less than 3 mm.  Mobile plaque not present.    Aortic Arch:  Size normal.  Dissection not present.  Plaque thickness less than 3 mm.  Mobile plaque not present.    Descending Aorta:  Size normal.  Dissection not present.  Plaque thickness less than 3 mm.  Mobile plaque not present.        Atria    Right Atrium:  Size normal.  Spontaneous echo contrast not present.  Thrombus not present.  Tumor not present.  Device not present.      Left Atrium:  Size normal.  Spontaneous echo contrast not present.  Thrombus not present.  Tumor not present.  Device not present.    Left atrial appendage normal.      Septa    Atrial Septum:  Intra-atrial septal morphology normal.      Ventricular Septum:  Intra-ventricular septum morphology normal.          Other Findings  Pericardium:  normal  Pleural Effusion:  none  Pulmonary Arteries:  normal      Anesthesia Information  Performed Personally  Anesthesiologist:  Abdoulaye Aguiar MD

## 2025-01-08 NOTE — OR NURSING
Pt family, Lam ([facility] [phone], updated of procedure start at 0756.

## 2025-01-08 NOTE — H&P
[facility]     HISTORY AND PHYSICAL           Olimpia Medina Patient Status:  No patient class for patient encounter    1965 MRN [id number]   Location [facility] Attending No att. providers found   Hosp Day # 0 PCP Tori Samuels MD         Date of ADMISSION:  2024\5     Reason for Consultation:  Multivessel coronary artery disease     History of Present Illness:  Olimpia Medina is a a(n) 59 year old female.  History of hypertension hyperlipidemia and nonalcoholic steatohepatitis.  Denies any history of chest pain, chest pressure, back pain, nausea or vomiting.  Underwent a screening calcium coronary scan for positive family history of coronary artery disease and found to have an elevated score.  This prompted referral to cardiology where stress test was ordered and was abnormal.  This prompted angiography demonstrating severe multivessel coronary artery disease including a 60-70% lesion left main and ostial circumflex lesion greater than 70%.  We are asked to evaluate for surgical revascularization options.  Patient denies symptoms as above.  She does report 4-5 episodes approximately 4 to 5 years ago where her heart rate will reach 180 and sustained there for approximately 15 to 30 minutes.  This was associated with either exercise, 1 episode where she was having a alcoholic drink and another episode when she knew she was dehydrated.  Has not been diagnosed with atrial fibrillation or arrhythmia otherwise.  Has seen cardiology at the time.     History:  Past Medical History       Past Medical History:    Arrhythmia     Hx of irregular heartbeat, cardiac workup completed , negative per cardiologist Dr. Joey Orantes, no treatment    Back problem     Neck pain, into left hand with numbness and tingling    DEPRESSION    HERPES SIMPLEX     shingles on right upper arm, genital, not oral    HYPERLIPIDEMIA    Low grade mucinous neoplasm of appendix    Migraines      Blurred Vision r/t headache    Migraines    NEUROCARDIOGENIC DISEASE    Other and unspecified hyperlipidemia    Thyroid disease         Past Surgical History         Past Surgical History:   Procedure Laterality Date    Appendectomy   07/10/2018     Laparoscopic Cecectomy, Drainage of Appendiceal abcess    Appendectomy        Colonoscopy N/A 2018     Procedure: COLONOSCOPY, POSSIBLE BIOPSY, POSSIBLE POLYPECTOMY 69850;  Surgeon: Guillermo Meyer MD;  Location: [facility]    Colonoscopy N/A 2021     Procedure: COLONOSCOPY;  Surgeon: Guillermo Meyer MD;  Location: [facility]    Lasik enhancement - od - right eye Bilateral      Other surgical history   01/10/2011     flex cysto, dr alvarez    Other surgical history   2011     Rt knee torn meniscus repair    Other surgical history Left 2015     Procedure: KNEE ARTHROSCOPY;  Surgeon: Eduardo Hidalgo MD;  Location:  [facility] OR    Other surgical history   2018     robotic right hemicolectomy    Other surgical history         lef t meniscus repair knee         Family History         Family History   Problem Relation Age of Onset    Hypertension Mother      Lipids Mother      Heart Disorder Mother           heavy smoker    Other (PVD) Mother      Diabetes Maternal Grandmother      Hypertension Maternal Grandmother      Lipids Maternal Grandmother      Obesity Maternal Grandmother      Heart Disorder Maternal Grandmother           MI  60's    Cancer Paternal Grandmother           colon cancer 50's, 92    Cancer Paternal Grandfather           bladder ca 60's    Cancer Father           Bladder cancer,  late 60'd    Hypertension Brother      Ovarian Cancer Paternal Cousin Female           under 50          reports that she has never smoked. She has never used smokeless tobacco. She reports current alcohol use. She reports that she does not use drugs.     Allergies:  [Allergies]    [Allergies]         Allergen Reactions    Pcns [Penicillins] HIVES       Patient has been on meropenem recently        Medications:  Aspirin losartan statin levothyroxine     Review of Systems:  Heart rate is 70 sinus, respiratory rate is 14     Physical Exam:  GENERAL: No acute distress.  VITAL SIGNS: See above.  HEENT: Trachea midline.  No jugular venous distention.  No carotid bruit.  CHEST: Chest wall is nontender.  HEART: Regular rate and rhythm without murmurs.  LUNGS: Clear to auscultation bilaterally.  ABDOMEN: Soft, nontender nondistended.  VASCULAR: Palpable radial artery pulses 2+ bilateral.  SKIN: No rash, no excessive bruising, petechiae, or purpura.  NEUROLOGIC: Cranial nerves II-XII intact without motor/sensory deficit.        Laboratory Data:  White blood cell count 5.3, hematocrit 43.8, platelet count 173,000, creatinine 0.88     Echocardiogram ejection fraction is 60 to 65%, normal left ventricular motion, trivial mitral regurgitation, there is mitral annular calcification, mild aortic insufficiency.     Cardiologist: Evan Mallory MD  Primary Proceduralist: Evan Mallory MD  Procedure Performed: C, IFR of the LAD and ramus.  Date of Procedure: 11/13/2024   Indication: Abnormal stress test     Summary of findings: Significant stenosis of the distal left main ostial circumflex.        Left Ventriculography and hemodynamics: LVEDP 2 mmHg        Coronary Angiography  RCA:  Dominant and free of obstructive disease, supplies PDA and PL  Left main: Distal 60 to 70% stenosis  Left anterior descending:  Patent and free of obstructive disease, supplies 2 diagonals which are non-obstructive  Circumflex: Ostial 90% stenosis followed by mid 70 to 80% stenosis, supplies 2 OM branches which are patent  Ramus intermedius luminal irregularities        Assessment:  IFR of the LAD and ramus performed both significant 0.84, 0.79 respectively        Impression and Plan:      Patient Active Problem List   Diagnosis    Low grade  mucinous neoplasm of appendix    Preoperative testing    Lipoma of left upper extremity    Bronchiectasis with acute lower respiratory infection (HCC)    Hepatic steatosis    Microscopic hematuria    Obesity (BMI 30-39.9)    Mixed hyperlipidemia    Mass of left forearm    Essential hypertension    Liver cyst    Vitamin D deficiency    Encounter for screening mammogram for malignant neoplasm of breast    Hyperlipidemia    Sensation of plugged ear on right side    Impacted cerumen of right ear    Elevated coronary artery calcium score         59-year-old female with multivessel coronary artery disease, family history of coronary artery disease     Patient will benefit from surgical revascularization.  I had a long discussion with the patient and her .  Using pictures, I explained the nature of her heart disease.  I explained his current disease.  I explained treatement options of medical management, high risk PCI and surgical revascularization.  I explained the operation, median sternotomy, use of cardiopulmonary bypass machine, and cardiac arrest.  I explained the bypass operation, conduit selection, long term patency rates of mammary artery and reverse saphenous vein.  We discussed benefits. We discussed risks including but not limited to: bleeding, coagulopathy, transfusion (to which he agrees to accept after discussing risks of transfusion), infection, sternal dehiscence, prolonged ventilation, myocardial infarction, stroke, arrhythmia, atrial fibrillation, kidney injury, dialysis, multisystem organ dysfunction, imponderables and death.       No issues since I last saw her in office approximately 2 weeks ago.  Proceed to surgery today.    Alfredito Caruso MD  Cardiothoracic Surgery  [facility]

## 2025-01-08 NOTE — CONSULTS
[facility]    PATIENT'S NAME: CARL GENTILE   ATTENDING PHYSICIAN: Alfredito Caruso MD   CONSULTING PHYSICIAN: Melanie Carty MD   PATIENT ACCOUNT#:   [id number]    LOCATION:  [address] [place] [address]  MEDICAL RECORD #:   [id number]       YOB: 1965  ADMISSION DATE:       01/08/2025      CONSULT DATE:  01/08/2025    REPORT OF CONSULTATION      REASON FOR ADMISSION:  Post coronary artery bypass graft surgery.    HISTORY OF PRESENT ILLNESS:  The patient is a 59-year-old  female who had screening calcium score study as an outpatient which showed high calcium score.  She was referred to Cardiology and considering her strong family history and tobacco use referred for cardiac angiogram.  She had left main 60% to 70%, ostial left circumflex 90%, and mid left circumflex 70% to 80%.  Referred to Cardiovascular Surgery.  Had carotid ultrasound as an outpatient which showed no hemodynamically significant stenosis.  Today scheduled for above-mentioned procedure.  Postoperatively transferred to PACU for further monitoring.    PAST MEDICAL HISTORY:  Multivessel coronary artery disease, hypertension, hyperlipidemia, osteoarthritis, migraines, obesity, hepatic steatosis.    PAST SURGICAL HISTORY:  Laparoscopic appendectomy with partial cecectomy for low-grade mucinous tumor, cystoscopy, and bilateral knee arthroscopic procedure.    MEDICATIONS:  Please see medication reconciliation list.     ALLERGIES:  Latex and penicillin.    SOCIAL HISTORY:  No tobacco or drug use.  Social alcohol.  Independent for basic activities of daily living at baseline.    FAMILY HISTORY:  Mother had coronary artery disease and peripheral arterial disease.    REVIEW OF SYSTEMS:  Unobtainable.  Currently sedated with mechanical ventilation support.      PHYSICAL EXAMINATION:    GENERAL:  Sedated with mechanical ventilation support.  VITAL SIGNS:  Temperature 97.4, pulse 54, respiratory rate 16, blood pressure 167/78, pulse ox  96%.  HEENT:  Eyes:  Anicteric sclerae.  Pupils round.  Endotracheal tube in place.  NECK:  Trachea midline.  Right internal jugular vein central line.  CHEST:  Midsternal dressing with chest tube projected and attached to an atrium.  LUNGS:  Diminished breathing sounds both lung bases.  Faint crackles.   ABDOMEN:  Soft, nondistended.  No tenderness.  Positive bowel sounds.  EXTREMITIES:  Right radial artery arterial line.  Left lower extremity dressing.  No right leg edema.    ASSESSMENT AND PLAN:  Multivessel coronary artery disease status post coronary artery bypass graft utilizing left internal mammary artery to the LAD, SVG to ramus, left leg endoscopic great saphenous vein graft harvest, intraoperative transesophageal echocardiogram, insertion of temporary ventricular pacing wire.  Continue mechanical ventilation support.  Monitor hemodynamic and respiratory status.  Monitor surgical wound and chest tube output.  Insulin drip with close monitoring of glucose.  Intensive Care and Cardiology consults.     Dictated By Melanie Carty MD  d: 01/08/2025 13:26:21  t: 01/08/2025 13:43:17  Job [id number]  FB/    cc: Alfredito Caruso MD

## 2025-01-08 NOTE — OPERATIVE REPORT
Operative Note    Patient Name: Olimpia Medina    Preoperative Diagnosis: Coronary artery disease involving native coronary artery of native heart without angina pectoris [I25.10]    Abnormal coronary screening CT  Multivessel coronary artery disease    Postoperative Diagnosis: Coronary artery disease involving native coronary artery of native heart without angina pectoris [I25.10]    Abnormal coronary screening CT  Multivessel coronary artery disease    Surgeons and Role:     * Alfredito Caruso MD - Primary     Assistant:  Jennifer BEAL    No otherwise qualified assistant was available to assist with vein harvesting, vein preparation, assisting, opening and closing of the operative sites.      Procedures:   Median sternotomy  Coronary artery bypass grafting x 2  Left internal mammary artery to left anterior descending artery  Reverse saphenous vein graft to ramus intermedius artery  Endoscopic vein harvesting, greater saphenous vein, left lower extremity  Complex sternal closure, sternal plating system, Anais    Indication:  59-year-old female with an abnormal calcium screening CT which prompted angiography demonstrating multivessel coronary artery disease including left main stenosis.  Indication for vascularization was met.  I discussed the patient alternatives, benefits and risk of surgery, she consents for surgery today.    Surgical Findings:   Left anterior descending artery 1.5 mm vessel, diffuse sclerosis and calcification throughout  Ramus intermedius artery 2 mm vessel, slightly intramyocardial with anastomosis performed, proximal there is heavy calcium  Posterior lateral obtuse marginal artery is a 0.25 mm vessel, extremely small, not a safe target for bypass  Completion echocardiogram ejection fraction is around 50 to 60% with no wall motion abnormalities.    Description of procedure:  Patient was taken to the operating room.  Anesthesia was induced.  Central line, swan, radial arterial line, juarez  and transesophageal echo were placed.  Patient was then positioned, prepped, draped in the usual sterile fashion.  Arms were tucked at sides, pressure points were padded and wrist was in a neutral, thumbs up position.  Timeout then performed verifying patient, procedure, beta-blocker, antibiotic.  Skin incision made and midline sternotomy performed.  The left internal mammary artery was harvested in a pedicled fashion from the underside of the left endothoracic fascia.  Simultaneously, the greater saphenous vein was harvested in an endoscopic fashion from the left lower extremity.  Systemic heparin was given.  The distal branches of the mammary artery beyond its bifurcation were clipped and the mammary artery transected.  There was excellent pulsatile flow.  The distal ends were clipped, the pedicle was wrapped in papaverine gauze and placed in the left chest.  Sternal retractor was then placed.  Pericardium entered and pericardial well created.  The greater saphenous vein was then prepared for bypass by tying the branches and reinforcing with clips.  Patient was cannulated in the distal ascending aorta and right atrial appendage with a three-stage venous cannula.  After confirming ACT over 480, cardiopulmonary bypass commenced.  Distal anastomotic sites were inspected for feasibility.  A antegrade needle was placed in the proximal ascending aorta.  Cross-clamp was then applied, patient was arrested with 1.6 L of antegrade cold blood cardioplegia solution.  There was excellent arrest after 400 mL.  Antegrade cardioplegia was given at regular 15-20 minute intervals thruout the case.  Cold water and slush was applied to the heart and patient was cooled to 32 °C.  A notch was made in the pericardium for the mammary pedicle later in the operative case.  Phrenic nerve was identified and spared.   Coronary findings as above.  Heart rotated to expose the ramus intermedius artery.  It is seen emanating out of the groove  with heavy calcium and then goes intramyocardial.  Myocardium was divided in a 2 mm vessel was identified free of disease.  An arteriotomy was made and a 1 mm probe was passed freely proximal distal.  End to side anastomosis then made with reverse saphenous vein and a 7-0 Prolene.  Prior to tying down, a 1 mm probe passed freely proximal and distal to target vessel.  After tying down, there is excellent antegrade and retrograde flow upon testing.  The vein graft is then sized to the ascending aorta, transected and spatulated and anastomosed to the aorta and inside fashion 6-0 Prolene.  Heart then rotated to expose left anterior descending artery.  In the midportion, the artery was identified.  Is a 1.5 mm vessel.  Arteriotomy is made.  Overall the artery wall is thicker than expected, there is diffuse calcification and sclerosis throughout the vessel but a 1 mm probe passes freely proximal and distal without issue.  The distal end of the mammary pedicle is then partially skeletonized, transected and spatulated and anastomosed to the left anterior descending artery with an 8-0 Prolene.  Prior to tying down, a 1 mm probe passed freely proximal and distal in the left anterior descending artery.  After tying down, the bulldog is removed.  Anastomosis hemostatic and Doppler examination confirms antegrade and retrograde flow in the left anterior descending artery.  The pedicle is secured to the heart with 6-0 Prolene x 2.  Hotshot cardioplegia given antegrade.  Right ventricular pacing wires placed and externalized.  Cross-clamp is removed.  Patient is defibrillated to normal sinus rhythm.  After fully rewarming, patient is weaned off of cardiopulmonary bypass without event.  Protamine is given.  Patient decannulated.  Cannulation sites were oversewn.  Hemostasis ensured at the operative sites including not limited to the mammary bed, proximals, distals and cannulation sites.  Attention is turned to closure.  The  pericardium was reapproximated over the both ventricles, mammary pedicle and aorta.  Chest tubes are placed, 32 [place] angled tip in the left total space and 32 [place] straight with the tip in the right pleural space traversing the mediastinal space.  The sternum was closed with multiple double stainless steel wires and reinforced with sternal placement Deloit system.  The wounds were all then copiously irrigated, closed multiple layers and sterile dressings are applied.  Patient then passed bleeding trials which were performed in the operating room.  Patient is then taken to the ICU in critical condition on low-dose dobutamine and Levophed infusions and in normal sinus rhythm.  Sponge, needle and instrument counts are all correct at the end of the case.    Anesthesia: Cardiac    Complications: None    Implants: Sternal plating system, Anais    Specimen: None    Drains: 32 Azerbaijani chest tubes x 2    Condition: Critical to CVICU    Estimated Blood Loss: 300 mL    Alfredito Caruso MD

## 2025-01-08 NOTE — DIETARY NOTE
Deferred Cardiac Education Note    Consult received for diet education per order set. Education deferred until pt transferred out of CCU or until s/p intervention and when appropriate for teaching.       Nazia Turk RD, LDN  Clinical Dietitian  P: [phone]

## 2025-01-08 NOTE — RESPIRATORY THERAPY NOTE
Patient received intubated and on ventilator SIMV/VC 12/600/+5/40%. Bilateral breath sounds auscultated. Suction provided when indicated. No acute events during the daytime. RT will continue to monitor.

## 2025-01-08 NOTE — PLAN OF CARE
Patient stable post cabg x2. X1 dose albumin given. Amio started then stopped due to a pause and bradycardia. Patient extubated at 1645. Family at the bedside.     CT output: 390 ml / 6.5 hours  Urine output: 1006ml / 6.5 hours    Problem: Patient Centered Care  Goal: Patient preferences are identified and integrated in the patient's plan of care  Description: Interventions:  - What would you like us to know as we care for you? I like to travel  - Provide timely, complete, and accurate information to patient/family  - Incorporate patient and family knowledge, values, beliefs, and cultural backgrounds into the planning and delivery of care  - Encourage patient/family to participate in care and decision-making at the level they choose  - Honor patient and family perspectives and choices  Outcome: Progressing     Problem: Patient/Family Goals  Goal: Patient/Family Long Term Goal  Description: Patient's Long Term Goal: Go home upon discharge    Interventions:  - Monitor labs  - Administer medications  - Pain management  - Patient centered care  - Keep patient and family informed on plan of care  - See additional Care Plan goals for specific interventions  Outcome: Progressing  Goal: Patient/Family Short Term Goal  Description: Patient's Short Term Goal: manage pain    Interventions:   - PRN meds  - repositioning  - education  - See additional Care Plan goals for specific interventions  Outcome: Progressing

## 2025-01-09 ENCOUNTER — APPOINTMENT (OUTPATIENT)
Dept: GENERAL RADIOLOGY | Facility: HOSPITAL | Age: 60
End: 2025-01-09
Attending: CLINICAL NURSE SPECIALIST
Payer: COMMERCIAL

## 2025-01-09 LAB
ANION GAP SERPL CALC-SCNC: 8 MMOL/L (ref 0–18)
ATRIAL RATE: 70 BPM
ATRIAL RATE: 73 BPM
BASOPHILS # BLD AUTO: 0.01 X10(3) UL (ref 0–0.2)
BASOPHILS NFR BLD AUTO: 0.1 %
BUN BLD-MCNC: 11 MG/DL (ref 9–23)
BUN/CREAT SERPL: 14.5 (ref 10–20)
CALCIUM BLD-MCNC: 8.5 MG/DL (ref 8.7–10.4)
CHLORIDE SERPL-SCNC: 112 MMOL/L (ref 98–112)
CO2 SERPL-SCNC: 25 MMOL/L (ref 21–32)
CREAT BLD-MCNC: 0.76 MG/DL
DEPRECATED RDW RBC AUTO: 38.5 FL (ref 35.1–46.3)
EGFRCR SERPLBLD CKD-EPI 2021: 91 ML/MIN/1.73M2 (ref 60–?)
EOSINOPHIL # BLD AUTO: 0 X10(3) UL (ref 0–0.7)
EOSINOPHIL NFR BLD AUTO: 0 %
ERYTHROCYTE [DISTWIDTH] IN BLOOD BY AUTOMATED COUNT: 12.5 % (ref 11–15)
GLUCOSE BLD-MCNC: 135 MG/DL (ref 70–99)
GLUCOSE BLDC GLUCOMTR-MCNC: 119 MG/DL (ref 70–99)
GLUCOSE BLDC GLUCOMTR-MCNC: 122 MG/DL (ref 70–99)
GLUCOSE BLDC GLUCOMTR-MCNC: 124 MG/DL (ref 70–99)
GLUCOSE BLDC GLUCOMTR-MCNC: 125 MG/DL (ref 70–99)
GLUCOSE BLDC GLUCOMTR-MCNC: 129 MG/DL (ref 70–99)
GLUCOSE BLDC GLUCOMTR-MCNC: 130 MG/DL (ref 70–99)
GLUCOSE BLDC GLUCOMTR-MCNC: 134 MG/DL (ref 70–99)
GLUCOSE BLDC GLUCOMTR-MCNC: 134 MG/DL (ref 70–99)
GLUCOSE BLDC GLUCOMTR-MCNC: 136 MG/DL (ref 70–99)
GLUCOSE BLDC GLUCOMTR-MCNC: 136 MG/DL (ref 70–99)
GLUCOSE BLDC GLUCOMTR-MCNC: 137 MG/DL (ref 70–99)
GLUCOSE BLDC GLUCOMTR-MCNC: 137 MG/DL (ref 70–99)
GLUCOSE BLDC GLUCOMTR-MCNC: 145 MG/DL (ref 70–99)
GLUCOSE BLDC GLUCOMTR-MCNC: 147 MG/DL (ref 70–99)
GLUCOSE BLDC GLUCOMTR-MCNC: 156 MG/DL (ref 70–99)
GLUCOSE BLDC GLUCOMTR-MCNC: 163 MG/DL (ref 70–99)
GLUCOSE BLDC GLUCOMTR-MCNC: 174 MG/DL (ref 70–99)
GLUCOSE BLDC GLUCOMTR-MCNC: 177 MG/DL (ref 70–99)
GLUCOSE BLDC GLUCOMTR-MCNC: 186 MG/DL (ref 70–99)
HCT VFR BLD AUTO: 33.3 %
HGB BLD-MCNC: 11.5 G/DL
IMM GRANULOCYTES # BLD AUTO: 0.06 X10(3) UL (ref 0–1)
IMM GRANULOCYTES NFR BLD: 0.5 %
LYMPHOCYTES # BLD AUTO: 0.49 X10(3) UL (ref 1–4)
LYMPHOCYTES NFR BLD AUTO: 4.3 %
MAGNESIUM SERPL-MCNC: 1.9 MG/DL (ref 1.6–2.6)
MCH RBC QN AUTO: 29.4 PG (ref 26–34)
MCHC RBC AUTO-ENTMCNC: 34.5 G/DL (ref 31–37)
MCV RBC AUTO: 85.2 FL
MONOCYTES # BLD AUTO: 0.39 X10(3) UL (ref 0.1–1)
MONOCYTES NFR BLD AUTO: 3.4 %
NEUTROPHILS # BLD AUTO: 10.36 X10 (3) UL (ref 1.5–7.7)
NEUTROPHILS # BLD AUTO: 10.36 X10(3) UL (ref 1.5–7.7)
NEUTROPHILS NFR BLD AUTO: 91.7 %
OSMOLALITY SERPL CALC.SUM OF ELEC: 301 MOSM/KG (ref 275–295)
P AXIS: 48 DEGREES
P AXIS: 71 DEGREES
P-R INTERVAL: 186 MS
P-R INTERVAL: 212 MS
PLATELET # BLD AUTO: 116 10(3)UL (ref 150–450)
PLATELETS.RETICULATED NFR BLD AUTO: 8.3 % (ref 0–7)
POTASSIUM SERPL-SCNC: 3.9 MMOL/L (ref 3.5–5.1)
Q-T INTERVAL: 428 MS
Q-T INTERVAL: 446 MS
QRS DURATION: 76 MS
QRS DURATION: 86 MS
QTC CALCULATION (BEZET): 471 MS
QTC CALCULATION (BEZET): 481 MS
R AXIS: -1 DEGREES
R AXIS: 8 DEGREES
RBC # BLD AUTO: 3.91 X10(6)UL
SODIUM SERPL-SCNC: 145 MMOL/L (ref 136–145)
T AXIS: 26 DEGREES
T AXIS: 3 DEGREES
VENTRICULAR RATE: 70 BPM
VENTRICULAR RATE: 73 BPM
WBC # BLD AUTO: 11.3 X10(3) UL (ref 4–11)

## 2025-01-09 PROCEDURE — 99232 SBSQ HOSP IP/OBS MODERATE 35: CPT | Performed by: INTERNAL MEDICINE

## 2025-01-09 PROCEDURE — 99233 SBSQ HOSP IP/OBS HIGH 50: CPT | Performed by: HOSPITALIST

## 2025-01-09 PROCEDURE — 71045 X-RAY EXAM CHEST 1 VIEW: CPT | Performed by: CLINICAL NURSE SPECIALIST

## 2025-01-09 RX ORDER — TRAMADOL HYDROCHLORIDE 50 MG/1
50 TABLET ORAL EVERY 6 HOURS PRN
Status: DISCONTINUED | OUTPATIENT
Start: 2025-01-09 | End: 2025-01-12

## 2025-01-09 RX ORDER — MAGNESIUM SULFATE 1 G/100ML
1 INJECTION INTRAVENOUS ONCE
Status: COMPLETED | OUTPATIENT
Start: 2025-01-09 | End: 2025-01-09

## 2025-01-09 RX ORDER — LEVOTHYROXINE SODIUM 25 UG/1
25 TABLET ORAL
Status: DISCONTINUED | OUTPATIENT
Start: 2025-01-09 | End: 2025-01-12

## 2025-01-09 RX ORDER — ROSUVASTATIN CALCIUM 20 MG/1
40 TABLET, COATED ORAL NIGHTLY
Status: DISCONTINUED | OUTPATIENT
Start: 2025-01-09 | End: 2025-01-12

## 2025-01-09 NOTE — PROGRESS NOTES
[facility]  Progress Note     Olimpia Medina  : 1965    Status: Inpatient  Day #: 1    Attending: Hieu Romero MD  PCP: Tori Samuels MD     Assessment and Plan:    Multivessel CAD s/p CABG  -cardiology, CT surgery, critical care following  -monitor chest tube output  -cont asa, plavix, metoprolol, statin    HTN  -cont meds and monitor    HL  -cont statin    Hypothyroidism  -cont levothyroxine    H/o BILLINGSLEY        DVT Mechanical Prophylaxis:   SCDs,    DVT Pharmacologic Prophylaxis   Medication    enoxaparin (Lovenox) 40 MG/0.4ML SUBQ injection 40 mg         DVT Pharmacologic prophylaxis: Aspirin 81 mg     Subjective:      Initial Chief Complaint:  CAD    Pain controlled. No SOB.     10 point ROS completed and was negative, except for pertinent positive and negatives stated in subjective.      Objective:      Temp:  [97.4 °F (36.3 °C)-99.6 °F (37.6 °C)] 99.3 °F (37.4 °C)  Pulse:  [50-76] 63  Resp:  [11-20] 12  BP: ()/(52-74) 109/66  SpO2:  [90 %-100 %] 96 %  AO: ()/(43-98) 120/58  FiO2 (%):  [40 %-70 %] 40 %  General:  Alert, no distress  HEENT:  Normocephalic, atraumatic  Cardiac:  Regular rate, regular rhythm  Pulmonary:  Clear to auscultation bilaterally, respirations unlabored  Gastrointestinal:  Soft, non-tender, normal bowel sounds  Musculoskeletal:  No joint swelling  Extremities:  No edema, no cyanosis, no clubbing  Neurologic:  nonfocal  Psychiatric:  Normal affect, calm and appropriate    Intake/Output Summary (Last 24 hours) at 2025 1156  Last data filed at 2025 1000  Gross per 24 hour   Intake 2625.2 ml   Output 2601 ml   Net 24.2 ml         Recent Labs   Lab 25  0805 25  1221 25  0412   WBC 4.8 7.7 11.3*   HGB 15.1 11.8* 11.5*   HCT 43.0 33.0* 33.3*   .0 107.0* 116.0*   RBC 5.03 3.88 3.91   MCV 85.5 85.1 85.2   MCH 30.0 30.4 29.4   MCHC 35.1 35.8 34.5   RDW 12.2 12.2 12.5   NEPRELIM 2.69  --  10.36*     Recent Labs   Lab 25  0805  01/08/25  1253 01/09/25  0412   BUN 17 11 11   CREATSERUM 1.05* 0.76 0.76   CA 10.1 7.5* 8.5*   ALB 4.6  --   --     146* 145   K 4.6 3.6 3.9    113* 112   CO2 31.0 26.0 25.0   * 162* 135*   MG 2.0 1.9 1.9   BILT 0.9  --   --    AST 32  --   --    ALT 74*  --   --    ALKPHO 48  --   --    TP 6.9  --   --    PTT 27.3 30.1  --    INR 0.94 1.22*  --        XR CHEST AP PORTABLE  (CPT=71045)    Result Date: 1/9/2025  CONCLUSION:   The endotracheal tube has been removed.   Unchanged [place]-Lucy catheter, right medial chest tube, and left basilar chest tube.  Unchanged epicardial drain.  Improved pulmonary edema.  Slightly decreased left pleural effusion.  No appreciable pneumothorax.  No new abnormality.        Dictated by (CST): Sebastian Lo MD on 1/09/2025 at 9:18 AM     Finalized by (CST): Sebastian Lo MD on 1/09/2025 at 9:20 AM          XR CHEST AP PORTABLE  (CPT=71045)    Result Date: 1/8/2025  CONCLUSION:  1. ET tube is high in position with the tip about 6 cm above the norma. 2. Swans Lucy catheter at junction between RVOT and main pulmonary artery pointing towards right. 3. Cardiomegaly with mild pulmonary venous congestion. 4. Chest tubes in place.  No pneumothorax.    Dictated by (CST): Kash Rivas MD on 1/08/2025 at 2:05 PM     Finalized by (CST): Kash Rivas MD on 1/08/2025 at 2:07 PM         EKG 12 Lead    Result Date: 1/9/2025  Sinus rhythm with 1st degree A-V block Possible Inferior infarct , age undetermined Abnormal ECG When compared with ECG of 08-JAN-2025 12:35, ST elevation now present in Lateral leads Confirmed by JOSE MULLIGAN, MEADOWS ([id number]) on 1/9/2025 11:44:22 AM    EKG 12 Lead    Result Date: 1/9/2025  Normal sinus rhythm Low voltage QRS, consider pulmonary disease, pericardial effusion, or normal variant Prolonged QT interval or tu fusion, consider myocardial disease, electrolyte imbalance, or drug effects Abnormal ECG When compared with ECG of 03-JAN-2025 08:09, QT has  lengthened Confirmed by HEYDI MYRICK ([id number]) on 1/9/2025 7:30:50 AM   Medications:   levothyroxine  25 mcg Oral Before breakfast    rosuvastatin  40 mg Oral Nightly    sennosides  8.6 mg Oral BID    docusate sodium  100 mg Oral BID    metoclopramide  10 mg Intravenous Q6H    famotidine  20 mg Oral BID    Or    famotidine  20 mg Intravenous BID    metoprolol tartrate  25 mg Oral 2x Daily(Beta Blocker)    mupirocin  1 Application Nasal BID    chlorhexidine gluconate  15 mL Mouth/Throat BID    multivitamin  1 tablet Oral Daily    ascorbic acid  500 mg Oral TID    enoxaparin  40 mg Subcutaneous Daily    clopidogrel  75 mg Oral Daily    aspirin  81 mg Oral Daily    vancomycin  15 mg/kg Intravenous Q12H      PRN Meds:   melatonin    polyethylene glycol (PEG 3350)    bisacodyl    ondansetron    DOBUTamine    nitroGLYCERIN in dextrose 5%    norepinephrine    potassium chloride **OR** potassium chloride    magnesium sulfate in dextrose 5%    magnesium sulfate in sterile water for injection    acetaminophen **OR** HYDROcodone-acetaminophen **OR** HYDROcodone-acetaminophen    morphINE **OR** morphINE    sodium chloride    albumin human    glucose **OR** glucose **OR** glucose-vitamin C **OR** dextrose **OR** glucose **OR** glucose **OR** glucose-vitamin C    Supplementary Documentation:   DVT Mechanical Prophylaxis:   SCDs,    DVT Pharmacologic Prophylaxis   Medication    enoxaparin (Lovenox) 40 MG/0.4ML SUBQ injection 40 mg         DVT Pharmacologic prophylaxis: Aspirin 81 mg      Code Status: Full Code  Todd: Todd catheter in place  Todd Duration (in days): 2  Central line: central venous catheter in place  BHAVIN:                     MDM High. Time spent on chart/note review, review of labs/imaging, discussion with patient, physical exam, discussion with staff, consultants, coordinating care, writing progress note, discussion of plan of care.      Hieu Romero MD

## 2025-01-09 NOTE — PROGRESS NOTES
[facility]  part of [facility]    Progress Note    Olimpia Medina Patient Status:  Inpatient    1965 MRN [id number]   Location [facility] 2W/SW Attending Hieu Romero MD   Hosp Day # 1 PCP Tori Samuels MD     Subjective:  Pt sitting in the chair this morning with her SO at bedside. She is awake, alert & oriented x 3, calm and appropriate with no neuro deficit observed. She has some mild sternal discomfort when taking deep breaths otherwise feels okay. Denies SOB.     Objective:  /67 (BP Location: Right arm)   Pulse 62   Temp 99.3 °F (37.4 °C) (Pulmonary Artery)   Resp 15   Ht 5' 9\" (1.753 m)   Wt 232 lb (105.2 kg)   LMP 01/15/2016 (Approximate)   SpO2 95%   BMI 34.26 kg/m²     Temp (24hrs), Av.1 °F (37.3 °C), Min:97.4 °F (36.3 °C), Max:99.6 °F (37.6 °C)      Intake/Output:    Intake/Output Summary (Last 24 hours) at 2025 1023  Last data filed at 2025 1000  Gross per 24 hour   Intake 2625.2 ml   Output 2601 ml   Net 24.2 ml       Wt Readings from Last 3 Encounters:   25 232 lb (105.2 kg)   10/28/24 239 lb (108.4 kg)   24 241 lb 6.4 oz (109.5 kg)       Allergies:  Allergies[1]    Labs:  Lab Results   Component Value Date    WBC 11.3 2025    HGB 11.5 2025    HCT 33.3 2025    .0 2025    CREATSERUM 0.76 2025    BUN 11 2025     2025    K 3.9 2025     2025    CO2 25.0 2025     2025    CA 8.5 2025    PTT 30.1 2025    INR 1.22 2025    MG 1.9 2025       Physical Exam:  Blood pressure 106/67, pulse 62, temperature 99.3 °F (37.4 °C), temperature source Pulmonary Artery, resp. rate 15, height 5' 9\" (1.753 m), weight 232 lb (105.2 kg), last menstrual period 01/15/2016, SpO2 95%, not currently breastfeeding.  General: NAD  Neck: No JVD  Lungs: Clear bilaterally anteriorly/diminished laterally   Chest tubes= 260cc/12 hours- no air leak  noted  Heart: RRR, S1, S2  Abdomen: Soft/Round, NT/ND, BS+x 4/+ Flatus/no BM   Extremities: Warm, dry, no LE edema bilat  Pulses: 2+ bilat DP  Skin: sternotomy drsg: CDI w/TPW intact/Left leg ACE wrap intact  Neurological:  AAOx3, MAEW    Assessment/Plan:  S/P CABG X 2 POD # 1  Encourage pulm toilet: IS- able to perform approx 750cc w/IS. Add EZ pap. Currently on 2 liters N/C: wean as able. CXR reviewed.   Pain meds as needed  Increase activity-OOB to chair and ambulate in hallway later today  Scds/Lovenox prophylaxis DVT prevention   Hemodynamically stable requiring no vasoactive meds: remove swan/rick and a-line  Expected post op volume overload: Lasix PRN   Expected post op anemia w/o clinical significance/stable.   IV Insulin protocol. No hx: DM- transition today  K/Mag replacement per protocol   Post op 12-lead EKG=SR   Hx: Thyroid disease- home med resumed post op. Incidental Left thyroid nodule noted on pre op carotid US/recommending thyroid US baseline exam. Pt asymptomatic. Discussed w/patient and recommend to further discuss w/PCP during her FU appt for on-going surveillance. She verbalized understanding.    Discharge planning: pt lives w/her SO. Anticipate home w/HH RN visits when ready.     Plan of care discussed w/patient/SO/RN and CV Surgeon: Dr. Zuly George, APRN  1/9/2025  10:23 AM       [1]   Allergies  Allergen Reactions    Pcns [Penicillins] HIVES     Patient has been on meropenem recently  Hives, age 24. Has had no exposure since.    Latex UNKNOWN     Added based on information entered during case entry, please review and add reactions, type, and severity as needed  Per pt. Denies latex allergy

## 2025-01-09 NOTE — PLAN OF CARE
Patient alert and oriented.  Insulin gtt continued per protocol.  No signs of bleeding; prevena intact.  Levo remained on standby, dobutamine weaned off overnight. PRN morphine to manage surgical pain.    Urine output: 775 ml  Chest tube output:  230 ml        Problem: CARDIOVASCULAR - ADULT  Goal: Maintains optimal cardiac output and hemodynamic stability  Description: INTERVENTIONS:  - Monitor vital signs, rhythm, and trends  - Monitor for bleeding, hypotension and signs of decreased cardiac output  - Evaluate effectiveness of vasoactive medications to optimize hemodynamic stability  - Monitor arterial and/or venous puncture sites for bleeding and/or hematoma  - Assess quality of pulses, skin color and temperature  - Assess for signs of decreased coronary artery perfusion - ex. Angina  - Evaluate fluid balance, assess for edema, trend weights  Outcome: Progressing  Goal: Absence of cardiac arrhythmias or at baseline  Description: INTERVENTIONS:  - Continuous cardiac monitoring, monitor vital signs, obtain 12 lead EKG if indicated  - Evaluate effectiveness of antiarrhythmic and heart rate control medications as ordered  - Initiate emergency measures for life threatening arrhythmias  - Monitor electrolytes and administer replacement therapy as ordered  Outcome: Progressing     Problem: RESPIRATORY - ADULT  Goal: Achieves optimal ventilation and oxygenation  Description: INTERVENTIONS:  - Assess for changes in respiratory status  - Assess for changes in mentation and behavior  - Position to facilitate oxygenation and minimize respiratory effort  - Oxygen supplementation based on oxygen saturation or ABGs  - Provide Smoking Cessation handout, if applicable  - Encourage broncho-pulmonary hygiene including cough, deep breathe, Incentive Spirometry  - Assess the need for suctioning and perform as needed  - Assess and instruct to report SOB or any respiratory difficulty  - Respiratory Therapy support as indicated  -  Manage/alleviate anxiety  - Monitor for signs/symptoms of CO2 retention  Outcome: Progressing     Problem: METABOLIC/FLUID AND ELECTROLYTES - ADULT  Goal: Glucose maintained within prescribed range  Description: INTERVENTIONS:  - Monitor Blood Glucose as ordered  - Assess for signs and symptoms of hyperglycemia and hypoglycemia  - Administer ordered medications to maintain glucose within target range  - Assess barriers to adequate nutritional intake and initiate nutrition consult as needed  - Instruct patient on self management of diabetes  Outcome: Progressing  Goal: Electrolytes maintained within normal limits  Description: INTERVENTIONS:  - Monitor labs and rhythm and assess patient for signs and symptoms of electrolyte imbalances  - Administer electrolyte replacement as ordered  - Monitor response to electrolyte replacements, including rhythm and repeat lab results as appropriate  - Fluid restriction as ordered  - Instruct patient on fluid and nutrition restrictions as appropriate  Outcome: Progressing     Problem: HEMATOLOGIC - ADULT  Goal: Maintains hematologic stability  Description: INTERVENTIONS  - Assess for signs and symptoms of bleeding or hemorrhage  - Monitor labs and vital signs for trends  - Administer supportive blood products/factors, fluids and medications as ordered and appropriate  - Administer supportive blood products/factors as ordered and appropriate  Outcome: Progressing  Goal: Free from bleeding injury  Description: (Example usage: patient with low platelets)  INTERVENTIONS:  - Avoid intramuscular injections, enemas and rectal medication administration  - Ensure safe mobilization of patient  - Hold pressure on venipuncture sites to achieve adequate hemostasis  - Assess for signs and symptoms of internal bleeding  - Monitor lab trends  - Patient is to report abnormal signs of bleeding to staff  - Avoid use of toothpicks and dental floss  - Use electric shaver for shaving  - Use soft bristle  tooth brush  - Limit straining and forceful nose blowing  Outcome: Progressing     Problem: GASTROINTESTINAL - ADULT  Goal: Minimal or absence of nausea and vomiting  Description: INTERVENTIONS:  - Maintain adequate hydration with IV or PO as ordered and tolerated  - Nasogastric tube to low intermittent suction as ordered  - Evaluate effectiveness of ordered antiemetic medications  - Provide nonpharmacologic comfort measures as appropriate  - Advance diet as tolerated, if ordered  - Obtain nutritional consult as needed  - Evaluate fluid balance  Outcome: Progressing  Goal: Maintains or returns to baseline bowel function  Description: INTERVENTIONS:  - Assess bowel function  - Maintain adequate hydration with IV or PO as ordered and tolerated  - Evaluate effectiveness of GI medications  - Encourage mobilization and activity  - Obtain nutritional consult as needed  - Establish a toileting routine/schedule  - Consider collaborating with pharmacy to review patient's medication profile  Outcome: Progressing  Goal: Maintains adequate nutritional intake (undernourished)  Description: INTERVENTIONS:  - Monitor percentage of each meal consumed  - Identify factors contributing to decreased intake, treat as appropriate  - Assist with meals as needed  - Monitor I&O, WT and lab values  - Obtain nutritional consult as needed  - Optimize oral hygiene and moisture  - Encourage food from home; allow for food preferences  - Enhance eating environment  Outcome: Progressing  Goal: Achieves appropriate nutritional intake (bariatric)  Description: INTERVENTIONS:  - Monitor for over-consumption  - Identify factors contributing to increased intake, treat as appropriate  - Monitor I&O, WT and lab values  - Obtain nutritional consult as needed  - Evaluate psychosocial factors contributing to over-consumption  Outcome: Progressing

## 2025-01-09 NOTE — PROGRESS NOTES
[facility]  part of [facility]     Progress Note    Olimpia Medina Patient Status:  Inpatient    1965 MRN [id number]   Location [facility] 2W/SW Attending Hieu Romero MD   Hosp Day # 1 PCP Tori Samuels MD       Subjective:   Patient seen and examined.  Resting in chair.  Pain well-controlled.  Denies significant dyspnea    Objective:   Blood pressure 112/70, pulse 61, temperature 99.3 °F (37.4 °C), temperature source Pulmonary Artery, resp. rate 15, height 5' 9\" (1.753 m), weight 232 lb (105.2 kg), last menstrual period 01/15/2016, SpO2 96%, not currently breastfeeding.  Intake/Output:   Last 3 shifts: I/O last 3 completed shifts:  In: 2358 [I.V.:1358; IV PIGGYBACK:1000]  Out: 2396 [Urine:1776; Chest Tube:620]   This shift: I/O this shift:  In: -   Out: 125 [Urine:75; Chest Tube:50]     Vent Settings: Vent Mode: CPAP;PS  FiO2 (%):  [40 %-70 %] 40 %  S RR:  [12] 12  S VT:  [600 mL] 600 mL  PEEP/CPAP (cm H2O):  [5 cm H20] 5 cm H20  MAP (cm H2O):  [7.4-9.7] 7.5    Hemodynamic parameters (last 24 hours): PAP: (20-33)/(5-14) 29/11  CO:  [5.6 L/min-8 L/min] 6.8 L/min  CI:  [2.5 L/min/m2-3.7 L/min/m2] 3.1 L/min/m2    Scheduled Meds:   Current Facility-Administered Medications   Medication Dose Route Frequency    sodium chloride 0.9% infusion  83 mL/hr Intravenous Continuous    insulin regular human (Novolin R, Humulin R) 100 Units in sodium chloride 0.9% 100 mL standard infusion (100 mL)  1-40 Units/hr Intravenous Continuous    sodium chloride 0.9% infusion  83 mL/hr Intravenous Continuous    sodium chloride 0.9% infusion   Intravenous Continuous    melatonin tab 3 mg  3 mg Oral Nightly PRN    sennosides (Senokot) tab 8.6 mg  8.6 mg Oral BID    docusate sodium (Colace) cap 100 mg  100 mg Oral BID    polyethylene glycol (PEG 3350) (Miralax) 17 g oral packet 17 g  17 g Oral Daily PRN    bisacodyl (Dulcolax) 10 MG rectal suppository 10 mg  10 mg Rectal Daily PRN    metoclopramide  (Reglan) 5 mg/mL injection 10 mg  10 mg Intravenous Q6H    ondansetron (Zofran) 4 MG/2ML injection 4 mg  4 mg Intravenous Q6H PRN    famotidine (Pepcid) tab 20 mg  20 mg Oral BID    Or    famotidine (Pepcid) 20 mg/2mL injection 20 mg  20 mg Intravenous BID    dexmedeTOMIDine in sodium chloride 0.9% (Precedex) 400 mcg/100mL infusion premix  0.2-1.5 mcg/kg/hr Intravenous Continuous    DOBUTamine in dextrose 5% (Dobutrex) 500 mg/250mL infusion premix  2.5-20 mcg/kg/min Intravenous Continuous PRN    nitroGLYCERIN in dextrose 5% 50 mg/250mL infusion premix  5-300 mcg/min Intravenous Continuous PRN    norepinephrine (Levophed) 4 mg/250mL infusion premix  0.5-30 mcg/min Intravenous Continuous PRN    metoprolol tartrate (Lopressor) tab 25 mg  25 mg Oral 2x Daily(Beta Blocker)    clevidipine (Cleviprex) 25 MG/50ML IV infusion  1-6 mg/hr Intravenous Continuous    potassium chloride 20 mEq/100mL IVPB premix 20 mEq  20 mEq Intravenous PRN    Or    potassium chloride 40 mEq/100mL IVPB premix (central line) 40 mEq  40 mEq Intravenous PRN    magnesium sulfate in dextrose 5% 1 g/100mL infusion premix 1 g  1 g Intravenous PRN    magnesium sulfate in sterile water for injection 2 g/50mL IVPB premix 2 g  2 g Intravenous PRN    mupirocin (Bactroban) 2% nasal ointment 1 Application  1 Application Nasal BID    chlorhexidine gluconate (Peridex) 0.12 % oral solution 15 mL  15 mL Mouth/Throat BID    multivitamin (Tab-A-Yoan/Beta Carotene) tab 1 tablet  1 tablet Oral Daily    ascorbic acid (Vitamin C) tab 500 mg  500 mg Oral TID    dextrose 5%-sodium chloride 0.45% infusion   mL/hr Intravenous Continuous    enoxaparin (Lovenox) 40 MG/0.4ML SUBQ injection 40 mg  40 mg Subcutaneous Daily    acetaminophen (Tylenol) tab 650 mg  650 mg Oral Q4H PRN    Or    HYDROcodone-acetaminophen (Norco) 5-325 MG per tab 1 tablet  1 tablet Oral Q4H PRN    Or    HYDROcodone-acetaminophen (Norco) 5-325 MG per tab 2 tablet  2 tablet Oral Q4H PRN    morphINE  PF 2 MG/ML injection 2 mg  2 mg Intravenous Q2H PRN    Or    morphINE PF 4 MG/ML injection 4 mg  4 mg Intravenous Q2H PRN    sodium chloride 0.9 % IV bolus 250 mL  250 mL Intravenous PRN    albumin human (Albumin) 5% injection 12.5 g  12.5 g Intravenous Once PRN    clopidogrel (Plavix) tab 75 mg  75 mg Oral Daily    aspirin DR tab 81 mg  81 mg Oral Daily    vancomycin (Vancocin) 1.5 g in sodium chloride 0.9% 250mL IVPB premix  15 mg/kg Intravenous Q12H    And    gentamicin (Garamycin) 400 mg in sodium chloride 0.9% 100 mL IVPB  5 mg/kg (Adjusted) Intravenous Once    glucose (Dex4) 15 GM/59ML oral liquid 15 g  15 g Oral Q15 Min PRN    Or    glucose (Glutose) 40% oral gel 15 g  15 g Oral Q15 Min PRN    Or    glucose-vitamin C (Dex-4) chewable tab 4 tablet  4 tablet Oral Q15 Min PRN    Or    dextrose 50% injection 50 mL  50 mL Intravenous Q15 Min PRN    Or    glucose (Dex4) 15 GM/59ML oral liquid 30 g  30 g Oral Q15 Min PRN    Or    glucose (Glutose) 40% oral gel 30 g  30 g Oral Q15 Min PRN    Or    glucose-vitamin C (Dex-4) chewable tab 8 tablet  8 tablet Oral Q15 Min PRN    insulin regular human (Novolin R, Humulin R) 100 Units in sodium chloride 0.9% 100 mL standard infusion (100 mL)  1-28 Units/hr Intravenous Continuous       Continuous Infusions:    sodium chloride      insulin regular 2 Units/hr (01/08/25 1047)    sodium chloride 83 mL/hr (01/08/25 0600)    sodium chloride 10 mL/hr at 01/08/25 1305    dexmedetomidine      DOBUTamine Stopped (01/08/25 2100)    nitroGLYCERIN in dextrose 5% Stopped (01/08/25 1235)    norepinephrine 1 mcg/min (01/08/25 1345)    clevidipine      dextrose 5%-sodium chloride 0.45% 40 mL/hr (01/08/25 2200)    insulin regular 5 Units/hr (01/09/25 0934)       Physical Exam  Constitutional: no acute distress  Eyes: PERRL  ENT: nares pateint  Neck: supple, no JVD  Cardio: RRR, S1 S2  Respiratory: Faint basilar crackles  GI: abdomen soft, non tender, active bowel sounds, no  organomegaly  Extremities: no clubbing, cyanosis, edema  Neurologic: no gross motor deficits  Skin: warm, dry      Results:     Lab Results   Component Value Date    WBC 11.3 01/09/2025    HGB 11.5 01/09/2025    HCT 33.3 01/09/2025    .0 01/09/2025    CREATSERUM 0.76 01/09/2025    BUN 11 01/09/2025     01/09/2025    K 3.9 01/09/2025     01/09/2025    CO2 25.0 01/09/2025     01/09/2025    CA 8.5 01/09/2025    PTT 30.1 01/08/2025    INR 1.22 01/08/2025    MG 1.9 01/09/2025       XR CHEST AP PORTABLE  (CPT=71045)    Result Date: 1/9/2025  CONCLUSION:   The endotracheal tube has been removed.   Unchanged [place]-Lucy catheter, right medial chest tube, and left basilar chest tube.  Unchanged epicardial drain.  Improved pulmonary edema.  Slightly decreased left pleural effusion.  No appreciable pneumothorax.  No new abnormality.        Dictated by (CST): Sebastian Lo MD on 1/09/2025 at 9:18 AM     Finalized by (CST): Sebastian Lo MD on 1/09/2025 at 9:20 AM          XR CHEST AP PORTABLE  (CPT=71045)    Result Date: 1/8/2025  CONCLUSION:  1. ET tube is high in position with the tip about 6 cm above the norma. 2. Swans Lucy catheter at junction between RVOT and main pulmonary artery pointing towards right. 3. Cardiomegaly with mild pulmonary venous congestion. 4. Chest tubes in place.  No pneumothorax.    Dictated by (CST): Kash Rivas MD on 1/08/2025 at 2:05 PM     Finalized by (CST): Kash Rivas MD on 1/08/2025 at 2:07 PM           EKG 12 Lead    Result Date: 1/9/2025  Sinus rhythm with 1st degree A-V block Possible Inferior infarct , age undetermined Abnormal ECG When compared with ECG of 08-JAN-2025 12:35, ST elevation now present in Lateral leads    EKG 12 Lead    Result Date: 1/9/2025  Normal sinus rhythm Low voltage QRS, consider pulmonary disease, pericardial effusion, or normal variant Prolonged QT interval or tu fusion, consider myocardial disease, electrolyte imbalance, or drug  effects Abnormal ECG When compared with ECG of 03-JAN-2025 08:09, QT has lengthened Confirmed by HEYDI MYRICK ([id number]) on 1/9/2025 7:30:50 AM     Assessment   1.  POD #1 status post CABG x 2  2.  Coronary artery disease  3.  Hypertension  4.  BILLINGSLEY  5.  Hyperlipidemia     Plan   -Patient presents for scheduled CABG x 2 on 1/8/2025.  Recent abnormal calcium CT with subsequent cardiac cath with significant coronary artery disease seen.  -Tolerating extubation  -Up in chair.  Incentive spirometry.  -Aspirin, Plavix, beta-blocker, statin  -Close hemodynamic monitoring  -Wean insulin gtt.  -Monitor chest tube output  -DVT prophylaxis: Nancy Good DO  Pulmonary Critical Care Medicine  [facility]

## 2025-01-09 NOTE — CONSULTS
[facility]  part of [facility]    Cardiology Consultation    Olimpia Medina Patient Status:  Inpatient    1965 MRN [id number]   Location [facility] 2W/SW Attending Alfredito Caruso MD   Hosp Day # 0 PCP Tori Samuels MD     Date of Admission:  2025  Date of Consult:  2025  Reason for Consultation: s/p 2v CABG    History of Present Illness:   Patient is a 59 year old female with sig PMH/o severe 2v CAD who underwent elective 2v CABG today. Pt is currently status post successful 2v CABG with LIMA-LAD and rSVG-RI.   Pt was initially evaluated for CAD after significantly elevated CAC score and strong family history of premature CAD.  Currently intubated post-op, hemodynamically stable.   Assessment and Plan:   Multivessel CAD  Status post CABG  -[facility] with severe distal LM and ostial OM disease  -pre-op LV function preserved (60-65%)  -s/p 2v CABG with LIMA-LAD and rSVG-RI  -currently hemodynamically stable, not on pressor support  -volume, appears euvolemic; continue to monitor closely  -rhythm: currently SR, monitor on tele  -start BB and ASA when safe to do so  -start high-intensity statin  -CT management per [facility]    Fhx premature CAD  -will check Lp(a)    Past Medical History  Past Medical History:    Arrhythmia    Hx of irregular heartbeat, cardiac workup completed , negative per cardiologist Dr. Joey Orantes, no treatment    Back problem    Neck pain, into left hand with numbness and tingling    Coronary atherosclerosis    DEPRESSION    Disorder of thyroid    HERPES SIMPLEX    shingles on right upper arm, genital, not oral    High blood pressure    High cholesterol    Hx of motion sickness    HYPERLIPIDEMIA    Low grade mucinous neoplasm of appendix    Migraines    Blurred Vision r/t headache    Migraines    NEUROCARDIOGENIC DISEASE    Other and unspecified hyperlipidemia    Thyroid disease       Past Surgical History  Past Surgical History:   Procedure Laterality  Date    Appendectomy  07/10/2018    Laparoscopic Cecectomy, Drainage of Appendiceal abcess    Appendectomy      Colonoscopy N/A 2018    Procedure: COLONOSCOPY, POSSIBLE BIOPSY, POSSIBLE POLYPECTOMY 61456;  Surgeon: Guillermo Meyer MD;  Location: [facility]    Colonoscopy N/A 2021    Procedure: COLONOSCOPY;  Surgeon: Guillermo Meyer MD;  Location: [facility]    Lasik enhancement - od - right eye Bilateral     Other surgical history  01/10/2011    flex cysto, dr alvarez    Other surgical history  2011    Rt knee torn meniscus repair    Other surgical history Left 2015    Procedure: KNEE ARTHROSCOPY;  Surgeon: Eduardo Hidalgo MD;  Location:  MAIN OR    Other surgical history  2018    robotic right hemicolectomy    Other surgical history      lef t meniscus repair knee       Family History  Family History   Problem Relation Age of Onset    Hypertension Mother     Lipids Mother     Heart Disorder Mother         heavy smoker    Other (PVD) Mother     Diabetes Maternal Grandmother     Hypertension Maternal Grandmother     Lipids Maternal Grandmother     Obesity Maternal Grandmother     Heart Disorder Maternal Grandmother         MI  60's    Cancer Paternal Grandmother         colon cancer 50's, 92    Cancer Paternal Grandfather         bladder ca 60's    Cancer Father         Bladder cancer,  late 60'd    Hypertension Brother     Ovarian Cancer Paternal Cousin Female         under 50       Social History  Pediatric History   Patient Parents    Not on file     Other Topics Concern     Service Not Asked    Blood Transfusions Not Asked    Caffeine Concern No    Occupational Exposure Not Asked    Hobby Hazards Not Asked    Sleep Concern Not Asked    Stress Concern Not Asked    Weight Concern Not Asked    Special Diet Not Asked    Back Care Not Asked    Exercise Not Asked    Bike Helmet Not Asked    Seat Belt Yes    Self-Exams Not Asked     Grew up on a farm Not Asked    History of tanning Yes    Outdoor occupation Not Asked    Breast feeding Not Asked    Reaction to local anesthetic No    Pt has a pacemaker No    Pt has a defibrillator No   Social History Narrative    Not on file           Current Medications:  Current Facility-Administered Medications   Medication Dose Route Frequency    sodium chloride 0.9% infusion  83 mL/hr Intravenous Continuous    insulin regular human (Novolin R, Humulin R) 100 Units in sodium chloride 0.9% 100 mL standard infusion (100 mL)  1-40 Units/hr Intravenous Continuous    sodium chloride 0.9% infusion  83 mL/hr Intravenous Continuous    sodium chloride 0.9% infusion   Intravenous Continuous    acetaminophen (Ofirmev) 10 mg/mL infusion premix 1,000 mg  1,000 mg Intravenous Q8H    melatonin tab 3 mg  3 mg Oral Nightly PRN    [START ON 1/9/2025] sennosides (Senokot) tab 8.6 mg  8.6 mg Oral BID    [START ON 1/9/2025] docusate sodium (Colace) cap 100 mg  100 mg Oral BID    polyethylene glycol (PEG 3350) (Miralax) 17 g oral packet 17 g  17 g Oral Daily PRN    bisacodyl (Dulcolax) 10 MG rectal suppository 10 mg  10 mg Rectal Daily PRN    metoclopramide (Reglan) 5 mg/mL injection 10 mg  10 mg Intravenous Q6H    ondansetron (Zofran) 4 MG/2ML injection 4 mg  4 mg Intravenous Q6H PRN    famotidine (Pepcid) tab 20 mg  20 mg Oral BID    Or    famotidine (Pepcid) 20 mg/2mL injection 20 mg  20 mg Intravenous BID    dexmedeTOMIDine in sodium chloride 0.9% (Precedex) 400 mcg/100mL infusion premix  0.2-1.5 mcg/kg/hr Intravenous Continuous    DOBUTamine in dextrose 5% (Dobutrex) 500 mg/250mL infusion premix  2.5-20 mcg/kg/min Intravenous Continuous PRN    nitroGLYCERIN in dextrose 5% 50 mg/250mL infusion premix  5-300 mcg/min Intravenous Continuous PRN    norepinephrine (Levophed) 4 mg/250mL infusion premix  0.5-30 mcg/min Intravenous Continuous PRN    [START ON 1/9/2025] metoprolol tartrate (Lopressor) tab 25 mg  25 mg Oral 2x  Daily(Beta Blocker)    clevidipine (Cleviprex) 25 MG/50ML IV infusion  1-6 mg/hr Intravenous Continuous    potassium chloride 20 mEq/100mL IVPB premix 20 mEq  20 mEq Intravenous PRN    Or    potassium chloride 40 mEq/100mL IVPB premix (central line) 40 mEq  40 mEq Intravenous PRN    magnesium sulfate in dextrose 5% 1 g/100mL infusion premix 1 g  1 g Intravenous PRN    magnesium sulfate in sterile water for injection 2 g/50mL IVPB premix 2 g  2 g Intravenous PRN    mupirocin (Bactroban) 2% nasal ointment 1 Application  1 Application Nasal BID    chlorhexidine gluconate (Peridex) 0.12 % oral solution 15 mL  15 mL Mouth/Throat BID    [START ON 1/9/2025] multivitamin (Tab-A-Yoan/Beta Carotene) tab 1 tablet  1 tablet Oral Daily    [START ON 1/9/2025] ascorbic acid (Vitamin C) tab 500 mg  500 mg Oral TID    dextrose 5%-sodium chloride 0.45% infusion   mL/hr Intravenous Continuous    [START ON 1/9/2025] enoxaparin (Lovenox) 40 MG/0.4ML SUBQ injection 40 mg  40 mg Subcutaneous Daily    acetaminophen (Tylenol) tab 650 mg  650 mg Oral Q4H PRN    Or    HYDROcodone-acetaminophen (Norco) 5-325 MG per tab 1 tablet  1 tablet Oral Q4H PRN    Or    HYDROcodone-acetaminophen (Norco) 5-325 MG per tab 2 tablet  2 tablet Oral Q4H PRN    morphINE PF 2 MG/ML injection 2 mg  2 mg Intravenous Q2H PRN    Or    morphINE PF 4 MG/ML injection 4 mg  4 mg Intravenous Q2H PRN    sodium chloride 0.9 % IV bolus 250 mL  250 mL Intravenous PRN    albumin human (Albumin) 5% injection 12.5 g  12.5 g Intravenous Once PRN    [START ON 1/9/2025] clopidogrel (Plavix) tab 75 mg  75 mg Oral Daily    [START ON 1/9/2025] aspirin DR tab 81 mg  81 mg Oral Daily    vancomycin (Vancocin) 1.5 g in sodium chloride 0.9% 250mL IVPB premix  15 mg/kg Intravenous Q12H    And    [START ON 1/9/2025] gentamicin (Garamycin) 400 mg in sodium chloride 0.9% 100 mL IVPB  5 mg/kg (Adjusted) Intravenous Once    glucose (Dex4) 15 GM/59ML oral liquid 15 g  15 g Oral Q15 Min PRN     Or    glucose (Glutose) 40% oral gel 15 g  15 g Oral Q15 Min PRN    Or    glucose-vitamin C (Dex-4) chewable tab 4 tablet  4 tablet Oral Q15 Min PRN    Or    dextrose 50% injection 50 mL  50 mL Intravenous Q15 Min PRN    Or    glucose (Dex4) 15 GM/59ML oral liquid 30 g  30 g Oral Q15 Min PRN    Or    glucose (Glutose) 40% oral gel 30 g  30 g Oral Q15 Min PRN    Or    glucose-vitamin C (Dex-4) chewable tab 8 tablet  8 tablet Oral Q15 Min PRN    insulin regular human (Novolin R, Humulin R) 100 Units in sodium chloride 0.9% 100 mL standard infusion (100 mL)  1-28 Units/hr Intravenous Continuous     Medications Prior to Admission   Medication Sig    aspirin 81 MG Oral Tab EC Take 1 tablet (81 mg total) by mouth daily.    levothyroxine 25 MCG Oral Tab Take 1 tablet (25 mcg total) by mouth before breakfast.    rosuvastatin 40 MG Oral Tab Take 1 tablet (40 mg total) by mouth nightly.    ergocalciferol 1.25 MG (20397 UT) Oral Cap Take 1 capsule (50,000 Units total) by mouth once a week.    Tirzepatide-Weight Management (ZEPBOUND) 5 MG/0.5ML Subcutaneous Solution Inject 5 mg into the skin once a week. (Patient taking differently: Inject 5 mg into the skin once a week. Last dose will be Monday 12/16/24 - then stopped)    losartan 50 MG Oral Tab Take 1 tablet (50 mg total) by mouth daily. (Patient taking differently: Take 1 tablet (50 mg total) by mouth daily. Last dose 1/5/24)    ketoconazole 2 % External Cream Apply to affected area 2 times daily for 4 weeks    Na Sulfate-K Sulfate-Mg Sulf (SUPREP BOWEL PREP KIT) 17.5-3.13-1.6 GM/177ML Oral Solution Take as directed by GI clinic. Okay to substitute for generic. (Patient not taking: Reported on 11/5/2024)       Allergies  Allergies[1]    Review of Systems:   ROS  10 point review of systems completed and negative except as noted.    Physical Exam:   Patient Vitals for the past 24 hrs:   BP Temp Temp src Pulse Resp SpO2 Height Weight   01/08/25 2100 140/65 99.4 °F (37.4 °C)  Pulmonary Ar 73 17 96 % -- --   01/08/25 2000 131/60 99.6 °F (37.6 °C) Pulmonary Ar 69 19 98 % -- --   01/08/25 1900 133/63 -- -- 69 15 97 % -- --   01/08/25 1800 120/63 -- -- 70 17 97 % -- --   01/08/25 1700 131/61 99.5 °F (37.5 °C) Pulmonary Ar 67 17 94 % -- --   01/08/25 1645 -- -- -- -- -- 95 % -- --   01/08/25 1600 121/65 -- -- 64 15 97 % -- --   01/08/25 1550 -- -- -- 64 14 97 % -- --   01/08/25 1545 122/65 -- -- 64 12 97 % -- --   01/08/25 1530 -- -- -- 64 12 97 % -- --   01/08/25 1515 124/66 -- -- 64 12 99 % -- --   01/08/25 1500 119/66 98.9 °F (37.2 °C) Pulmonary Ar 65 12 99 % -- --   01/08/25 1445 -- -- -- 63 12 98 % -- --   01/08/25 1430 114/61 -- -- 62 12 98 % -- --   01/08/25 1415 115/65 -- -- 60 12 97 % -- --   01/08/25 1400 106/56 97.7 °F (36.5 °C) Pulmonary Ar 61 12 97 % -- --   01/08/25 1355 -- -- -- 62 12 99 % -- --   01/08/25 1345 108/65 -- -- 50 12 100 % -- --   01/08/25 1330 126/74 -- -- 50 12 100 % -- --   01/08/25 1315 90/52 -- -- 59 11 100 % -- --   01/08/25 1300 101/59 97.5 °F (36.4 °C) Pulmonary Ar 62 11 99 % -- --   01/08/25 1245 103/55 -- -- 66 12 100 % -- --   01/08/25 1230 113/58 97.4 °F (36.3 °C) Pulmonary Ar 72 13 98 % -- --   01/08/25 0610 -- -- -- 54 -- -- 5' 9\" (1.753 m) 232 lb (105.2 kg)   01/08/25 0532 (!) 167/78 99.1 °F (37.3 °C) Oral 61 16 96 % -- --       Intake/Output:   Last 3 shifts:   Intake/Output                   01/06/25 0700 - 01/07/25 0659 (Not Admitted) 01/07/25 0700 - 01/08/25 0659 (Not Admitted) 01/08/25 0700 - 01/09/25 0659       Intake    I.V.  --  --  920    I.V. -- -- 250    Volume (mL) Insulin -- -- 14    Volume (mL) Nitroglycerin -- -- 36    Volume (mL) Dobutamine -- -- 6    Volume (mL) Propofol -- -- 251    Volume (mL) Amiodarone -- -- 5    Volume (mL) (sodium chloride 0.9% infusion) -- -- 20    Volume (mL) (dextrose 5%-sodium chloride 0.45% infusion) -- -- 338    IV PIGGYBACK  --  --  900    Volume (mL) (acetaminophen (Ofirmev) 10 mg/mL infusion premix  1,000 mg) -- -- 200    Volume (mL) (potassium chloride 20 mEq/100mL IVPB premix 20 mEq) -- -- 100    Volume (mL) (albumin human (Albumin) 5% injection 12.5 g) -- -- 250    Volume (mL) (vancomycin (Vancocin) 1.5 g in sodium chloride 0.9% 250mL IVPB premix) -- -- 250    Volume (mL) (gentamicin (Garamycin) 400 mg in sodium chloride 0.9% 100 mL IVPB) -- -- 100    Total Intake -- -- 1820       Output    Urine  --  --  1171    Output (mL) (Urethral Catheter Latex;Double-lumen;Temperature probe) -- -- 1171    Chest Tube  --  --  440    Output (mL) (Y Chest Tube 1 and 2 Anterior Mediastinal 32 Fr. Anterior Pleural 32 Fr.) -- -- 440    Total Output -- -- 1611       Net I/O     -- -- 209          Vent Settings: Vent Mode: CPAP;PS  FiO2 (%):  [40 %-70 %] 40 %  S RR:  [12] 12  S VT:  [600 mL] 600 mL  PEEP/CPAP (cm H2O):  [5 cm H20] 5 cm H20  MAP (cm H2O):  [7.4-9.7] 7.5  Hemodynamic parameters (last 24 hours): PAP: (20-33)/(6-13) 33/11  CO:  [6 L/min-8 L/min] 7 L/min  CI:  [2.7 L/min/m2-3.7 L/min/m2] 3.2 L/min/m2  Scheduled Meds:    acetaminophen  1,000 mg Intravenous Q8H    [START ON 1/9/2025] sennosides  8.6 mg Oral BID    [START ON 1/9/2025] docusate sodium  100 mg Oral BID    metoclopramide  10 mg Intravenous Q6H    famotidine  20 mg Oral BID    Or    famotidine  20 mg Intravenous BID    [START ON 1/9/2025] metoprolol tartrate  25 mg Oral 2x Daily(Beta Blocker)    mupirocin  1 Application Nasal BID    chlorhexidine gluconate  15 mL Mouth/Throat BID    [START ON 1/9/2025] multivitamin  1 tablet Oral Daily    [START ON 1/9/2025] ascorbic acid  500 mg Oral TID    [START ON 1/9/2025] enoxaparin  40 mg Subcutaneous Daily    [START ON 1/9/2025] clopidogrel  75 mg Oral Daily    [START ON 1/9/2025] aspirin  81 mg Oral Daily    vancomycin  15 mg/kg Intravenous Q12H    And    [START ON 1/9/2025] gentamicin  5 mg/kg (Adjusted) Intravenous Once     Continuous Infusions:    sodium chloride      insulin regular 2 Units/hr (01/08/25 1047)     sodium chloride 83 mL/hr (01/08/25 0600)    sodium chloride 10 mL/hr at 01/08/25 1305    dexmedetomidine      DOBUTamine Stopped (01/08/25 2100)    nitroGLYCERIN in dextrose 5% Stopped (01/08/25 1235)    norepinephrine 1 mcg/min (01/08/25 1345)    clevidipine      dextrose 5%-sodium chloride 0.45% 40 mL/hr (01/08/25 1325)    insulin regular 5 Units/hr (01/08/25 2100)       Physical Exam:  General: intubated  HEENT: Normocephalic, anicteric sclera, neck supple, no thyromegaly or adenopathy.  Neck: No JVD, carotids 2+, no bruits.  Cardiac: Regular rate and rhythm. S1, S2 normal.  Lungs: +mech BS  Abdomen: Soft, non-tender. No organosplenomegally, mass or rebound, BS-present.  Extremities: Without clubbing or cyanosis. No edema.    Neurologic: intubated  Skin: Warm and dry.     Results:   Laboratory Data:  Lab Results   Component Value Date    WBC 7.7 01/08/2025    HGB 11.8 (L) 01/08/2025    HCT 33.0 (L) 01/08/2025    .0 (L) 01/08/2025    CREATSERUM 0.76 01/08/2025    BUN 11 01/08/2025     (H) 01/08/2025    K 3.6 01/08/2025     (H) 01/08/2025    CO2 26.0 01/08/2025     (H) 01/08/2025    CA 7.5 (L) 01/08/2025    ALB 4.6 01/03/2025    ALKPHO 48 01/03/2025    TP 6.9 01/03/2025    AST 32 01/03/2025    ALT 74 (H) 01/03/2025    PTT 30.1 01/08/2025    INR 1.22 (H) 01/08/2025    PTP 16.1 (H) 01/08/2025    T4F 0.9 01/11/2023    TSH 4.252 06/28/2024    ESRML 17 01/22/2020    MG 1.9 01/08/2025    PHOS 3.7 08/25/2018    B12 512 01/22/2020         Recent Labs   Lab 01/03/25  0805 01/08/25  1253   * 162*   BUN 17 11   CREATSERUM 1.05* 0.76   CA 10.1 7.5*    146*   K 4.6 3.6    113*   CO2 31.0 26.0     Recent Labs   Lab 01/03/25  0805 01/08/25  1221   RBC 5.03 3.88   HGB 15.1 11.8*   HCT 43.0 33.0*   MCV 85.5 85.1   MCH 30.0 30.4   MCHC 35.1 35.8   RDW 12.2 12.2   NEPRELIM 2.69  --    WBC 4.8 7.7   .0 107.0*       No results for input(s): \"BNPML\" in the last 168 hours.    No  results for input(s): \"TROP\", \"CK\" in the last 168 hours.    Imaging:  XR CHEST AP PORTABLE  (CPT=71045)    Result Date: 1/8/2025  CONCLUSION:  1. ET tube is high in position with the tip about 6 cm above the norma. 2. Swans Lucy catheter at junction between RVOT and main pulmonary artery pointing towards right. 3. Cardiomegaly with mild pulmonary venous congestion. 4. Chest tubes in place.  No pneumothorax.    Dictated by (CST): Kash Rivas MD on 1/08/2025 at 2:05 PM     Finalized by (CST): Kash Rivas MD on 1/08/2025 at 2:07 PM           Thank you for allowing me to participate in the care of your patient.    Chacorta Muñoz, DO  [facility]       [1]   Allergies  Allergen Reactions    Pcns [Penicillins] HIVES     Patient has been on meropenem recently  Hives, age 24. Has had no exposure since.    Latex UNKNOWN     Added based on information entered during case entry, please review and add reactions, type, and severity as needed  Per pt. Denies latex allergy

## 2025-01-09 NOTE — PLAN OF CARE
Problem: CARDIOVASCULAR - ADULT  Goal: Maintains optimal cardiac output and hemodynamic stability  Description: INTERVENTIONS:  - Monitor vital signs, rhythm, and trends  - Monitor for bleeding, hypotension and signs of decreased cardiac output  - Evaluate effectiveness of vasoactive medications to optimize hemodynamic stability  - Monitor arterial and/or venous puncture sites for bleeding and/or hematoma  - Assess quality of pulses, skin color and temperature  - Assess for signs of decreased coronary artery perfusion - ex. Angina  - Evaluate fluid balance, assess for edema, trend weights  Outcome: Progressing  Goal: Absence of cardiac arrhythmias or at baseline  Description: INTERVENTIONS:  - Continuous cardiac monitoring, monitor vital signs, obtain 12 lead EKG if indicated  - Evaluate effectiveness of antiarrhythmic and heart rate control medications as ordered  - Initiate emergency measures for life threatening arrhythmias  - Monitor electrolytes and administer replacement therapy as ordered  Outcome: Progressing     Problem: RESPIRATORY - ADULT  Goal: Achieves optimal ventilation and oxygenation  Description: INTERVENTIONS:  - Assess for changes in respiratory status  - Assess for changes in mentation and behavior  - Position to facilitate oxygenation and minimize respiratory effort  - Oxygen supplementation based on oxygen saturation or ABGs  - Provide Smoking Cessation handout, if applicable  - Encourage broncho-pulmonary hygiene including cough, deep breathe, Incentive Spirometry  - Assess the need for suctioning and perform as needed  - Assess and instruct to report SOB or any respiratory difficulty  - Respiratory Therapy support as indicated  - Manage/alleviate anxiety  - Monitor for signs/symptoms of CO2 retention  Outcome: Progressing     Problem: GENITOURINARY - ADULT  Goal: Absence of urinary retention  Description: INTERVENTIONS:  - Assess patient’s ability to void and empty bladder  - Monitor  intake/output and perform bladder scan as needed  - Follow urinary retention protocol/standard of care  - Consider collaborating with pharmacy to review patient's medication profile  - Implement strategies to promote bladder emptying  Outcome: Progressing

## 2025-01-09 NOTE — CM/SW NOTE
HH referral sent to [facility] in [facility] on 1/8. [facility] is surgeon's preferred HH agency. Unfortunately [facility] is unable to accept due to staffing shortage. New HH referral search started in [facility]. List of accepting agencies will be needed for choice. Signed F2F uploaded in [facility]. PT/OT evals pending at this time.    1/10/2024 at 1440: List of accepting HH agencies provided to patient at bedside. [facility] is choice at [place], agency reserved in [facility]. PT/OT evals remain pending.    Plan: Home w/spouse with [facility] pending evals and medical clearance.    OLIVIA CarballoN    [phone]

## 2025-01-09 NOTE — PROGRESS NOTES
[facility]  part of [facility]    Cardiology Progress Note    Olimpia Medina Patient Status:  Inpatient    1965 MRN [id number]   Location [facility] 2W/SW Attending Hieu Romero MD   Hosp Day # 1 PCP Tori Samuels MD     Interval History   Extubated, awake and seated in chair  Patient reports feeling okay this morning after restful nap  Denies any significant chest pain, shortness of breath  Telemetry unrevealing for significant arrhythmia    Assessment and Plan:   Multivessel CAD  Status post CABG  -LHC with severe distal LM and ostial OM disease  -pre-op LV function preserved (60-65%)  -s/p 2v CABG with LIMA-LAD and rSVG-RI  -currently hemodynamically stable, not on pressor support  -volume, appears euvolemic; continue to monitor closely  -rhythm: currently SR, monitor on tele  -BB and ASA  -start high-intensity statin with rosuvastatin 40 mg daily  -CT management per CVS     Fhx premature CAD  -Lp(a) is pending     Objective:   Patient Vitals for the past 24 hrs:   BP Temp Temp src Pulse Resp SpO2   25 1200 (!) 127/93 98.7 °F (37.1 °C) Oral 69 15 95 %   25 1100 109/66 -- -- 63 12 96 %   25 1000 106/67 -- -- 62 15 95 %   25 0900 112/70 99.3 °F (37.4 °C) Pulmonary Ar 61 15 96 %   25 0800 128/71 99.5 °F (37.5 °C) Pulmonary Ar 76 19 95 %   25 0700 116/64 -- -- 69 15 93 %   25 0600 107/63 99.5 °F (37.5 °C) Pulmonary Ar 75 19 90 %   25 0500 114/62 99.4 °F (37.4 °C) Pulmonary Ar 76 17 94 %   25 0400 115/67 99.5 °F (37.5 °C) Pulmonary Ar 73 20 93 %   25 0300 121/65 99.3 °F (37.4 °C) Pulmonary Ar 74 17 94 %   25 0200 113/63 99.3 °F (37.4 °C) Pulmonary Ar 67 12 94 %   25 0130 -- -- -- 68 12 94 %   25 0100 123/65 99.4 °F (37.4 °C) Pulmonary Ar 68 12 95 %   25 0000 109/60 99.4 °F (37.4 °C) Pulmonary Ar 66 14 96 %   25 2300 106/61 99.5 °F (37.5 °C) Pulmonary Ar 64 12 95 %   25 2200 104/59  99.5 °F (37.5 °C) Pulmonary Ar 64 19 94 %   01/08/25 2100 140/65 99.4 °F (37.4 °C) Pulmonary Ar 73 17 96 %   01/08/25 2000 131/60 99.6 °F (37.6 °C) Pulmonary Ar 69 19 98 %   01/08/25 1900 133/63 -- -- 69 15 97 %   01/08/25 1800 120/63 -- -- 70 17 97 %   01/08/25 1700 131/61 99.5 °F (37.5 °C) Pulmonary Ar 67 17 94 %   01/08/25 1645 -- -- -- -- -- 95 %   01/08/25 1600 121/65 -- -- 64 15 97 %   01/08/25 1550 -- -- -- 64 14 97 %   01/08/25 1545 122/65 -- -- 64 12 97 %   01/08/25 1530 -- -- -- 64 12 97 %   01/08/25 1515 124/66 -- -- 64 12 99 %   01/08/25 1500 119/66 98.9 °F (37.2 °C) Pulmonary Ar 65 12 99 %   01/08/25 1445 -- -- -- 63 12 98 %       Intake/Output:   Last 3 shifts:   Intake/Output                   01/07/25 0700 - 01/08/25 0659 (Not Admitted) 01/08/25 0700 - 01/09/25 0659 01/09/25 0700 - 01/10/25 0659       Intake    P.O.  --  --  360    P.O. -- -- 360    I.V.  --  1358  7.2    I.V. -- 250 --    Volume (mL) Insulin -- 52 --    Volume (mL) Nitroglycerin -- 36 --    Volume (mL) Dobutamine -- 6 --    Volume (mL) Propofol -- 251 --    Volume (mL) Amiodarone -- 5 --    Volume (mL) Norepinephrine -- -- 7.2    Volume (mL) (sodium chloride 0.9% infusion) -- 100 --    Volume (mL) (dextrose 5%-sodium chloride 0.45% infusion) -- 658 --    IV PIGGYBACK  --  1000  --    Volume (mL) (acetaminophen (Ofirmev) 10 mg/mL infusion premix 1,000 mg) -- 300 --    Volume (mL) (potassium chloride 20 mEq/100mL IVPB premix 20 mEq) -- 100 --    Volume (mL) (albumin human (Albumin) 5% injection 12.5 g) -- 250 --    Volume (mL) (vancomycin (Vancocin) 1.5 g in sodium chloride 0.9% 250mL IVPB premix) -- 250 --    Volume (mL) (gentamicin (Garamycin) 400 mg in sodium chloride 0.9% 100 mL IVPB) -- 100 --    Total Intake -- 2358 367.2       Output    Urine  --  1776  285    Output (mL) (Urethral Catheter Latex;Double-lumen;Temperature probe) -- 1776 285    Chest Tube  --  620  100    Output (mL) (Y Chest Tube 1 and 2 Anterior Mediastinal 32  Fr. Anterior Pleural 32 Fr.) -- 620 100    Total Output -- 2396 385       Net I/O     -- -38 -17.8             Vent Settings: Vent Mode: CPAP;PS  FiO2 (%):  [40 %] 40 %  S RR:  [12] 12  S VT:  [600 mL] 600 mL  PEEP/CPAP (cm H2O):  [5 cm H20] 5 cm H20  MAP (cm H2O):  [7.4-9.7] 7.5    Hemodynamic parameters (last 24 hours): PAP: (24-33)/(5-14) 29/11  CO:  [5.6 L/min-7.8 L/min] 6.8 L/min  CI:  [2.5 L/min/m2-3.5 L/min/m2] 3.1 L/min/m2    Scheduled Meds:    levothyroxine  25 mcg Oral Before breakfast    rosuvastatin  40 mg Oral Nightly    sennosides  8.6 mg Oral BID    docusate sodium  100 mg Oral BID    metoclopramide  10 mg Intravenous Q6H    famotidine  20 mg Oral BID    Or    famotidine  20 mg Intravenous BID    metoprolol tartrate  25 mg Oral 2x Daily(Beta Blocker)    mupirocin  1 Application Nasal BID    chlorhexidine gluconate  15 mL Mouth/Throat BID    multivitamin  1 tablet Oral Daily    ascorbic acid  500 mg Oral TID    enoxaparin  40 mg Subcutaneous Daily    clopidogrel  75 mg Oral Daily    aspirin  81 mg Oral Daily    vancomycin  15 mg/kg Intravenous Q12H       Continuous Infusions:    sodium chloride      insulin regular 2 Units/hr (01/08/25 1047)    sodium chloride 83 mL/hr (01/08/25 0600)    sodium chloride 10 mL/hr at 01/08/25 1305    dexmedetomidine      DOBUTamine Stopped (01/08/25 2100)    nitroGLYCERIN in dextrose 5% Stopped (01/08/25 1235)    norepinephrine 1 mcg/min (01/08/25 1345)    clevidipine      dextrose 5%-sodium chloride 0.45% 40 mL/hr (01/08/25 2200)    insulin regular 5 Units/hr (01/09/25 1200)       Results:     Lab Results   Component Value Date    WBC 11.3 (H) 01/09/2025    HGB 11.5 (L) 01/09/2025    HCT 33.3 (L) 01/09/2025    .0 (L) 01/09/2025    CREATSERUM 0.76 01/09/2025    BUN 11 01/09/2025     01/09/2025    K 3.9 01/09/2025     01/09/2025    CO2 25.0 01/09/2025     (H) 01/09/2025    CA 8.5 (L) 01/09/2025    ALB 4.6 01/03/2025    ALKPHO 48 01/03/2025     BILT 0.9 01/03/2025    TP 6.9 01/03/2025    AST 32 01/03/2025    ALT 74 (H) 01/03/2025    PTT 30.1 01/08/2025    INR 1.22 (H) 01/08/2025    T4F 0.9 01/11/2023    TSH 4.252 06/28/2024    ESRML 17 01/22/2020    MG 1.9 01/09/2025    PHOS 3.7 08/25/2018    B12 512 01/22/2020       Recent Labs   Lab 01/03/25  0805 01/08/25  1253 01/09/25  0412   * 162* 135*   BUN 17 11 11   CREATSERUM 1.05* 0.76 0.76   CA 10.1 7.5* 8.5*    146* 145   K 4.6 3.6 3.9    113* 112   CO2 31.0 26.0 25.0     Recent Labs   Lab 01/03/25  0805 01/08/25  1221 01/09/25  0412   RBC 5.03 3.88 3.91   HGB 15.1 11.8* 11.5*   HCT 43.0 33.0* 33.3*   MCV 85.5 85.1 85.2   MCH 30.0 30.4 29.4   MCHC 35.1 35.8 34.5   RDW 12.2 12.2 12.5   NEPRELIM 2.69  --  10.36*   WBC 4.8 7.7 11.3*   .0 107.0* 116.0*       No results for input(s): \"BNPML\" in the last 168 hours.    No results for input(s): \"TROP\", \"CK\" in the last 168 hours.    XR CHEST AP PORTABLE  (CPT=71045)    Result Date: 1/9/2025  CONCLUSION:   The endotracheal tube has been removed.   Unchanged [place]-Lucy catheter, right medial chest tube, and left basilar chest tube.  Unchanged epicardial drain.  Improved pulmonary edema.  Slightly decreased left pleural effusion.  No appreciable pneumothorax.  No new abnormality.        Dictated by (CST): Sebastian Lo MD on 1/09/2025 at 9:18 AM     Finalized by (CST): Sebastian Lo MD on 1/09/2025 at 9:20 AM          XR CHEST AP PORTABLE  (CPT=71045)    Result Date: 1/8/2025  CONCLUSION:  1. ET tube is high in position with the tip about 6 cm above the norma. 2. [place] Lucy catheter at junction between RVOT and main pulmonary artery pointing towards right. 3. Cardiomegaly with mild pulmonary venous congestion. 4. Chest tubes in place.  No pneumothorax.    Dictated by (CST): Kash Rivas MD on 1/08/2025 at 2:05 PM     Finalized by (CST): Kash Rivas MD on 1/08/2025 at 2:07 PM         EKG 12 Lead    Result Date: 1/9/2025  Sinus rhythm with  1st degree A-V block Possible Inferior infarct , age undetermined Abnormal ECG When compared with ECG of 08-JAN-2025 12:35, ST elevation now present in Lateral leads Confirmed by JOSE MULLIGAN CASH ([id number]) on 1/9/2025 11:44:22 AM    EKG 12 Lead    Result Date: 1/9/2025  Normal sinus rhythm Low voltage QRS, consider pulmonary disease, pericardial effusion, or normal variant Prolonged QT interval or tu fusion, consider myocardial disease, electrolyte imbalance, or drug effects Abnormal ECG When compared with ECG of 03-JAN-2025 08:09, QT has lengthened Confirmed by HEYDI MYRICK ([id number]) on 1/9/2025 7:30:50 AM          Exam:     Physical Exam:  General: Alert and oriented x 3. No apparent distress.   HEENT: Normocephalic, anicteric sclera, neck supple, no thyromegaly or adenopathy.  Neck: No JVD, carotids 2+, no bruits.  Cardiac: Regular rate and rhythm. S1, S2 normal.   Lungs: Clear without wheezes, rales, rhonchi or dullness.  Normal excursions and effort.  Abdomen: Soft, non-tender.   Extremities: Without clubbing or cyanosis.  Trace edema.    Neurologic: Alert and oriented, normal affect. No focal defects  Skin: Warm and dry.     Chacorta Muñoz, [facility] [place]

## 2025-01-09 NOTE — RESPIRATORY THERAPY NOTE
Patient received intubated with ETT size 7.5 at 22cm at the lip, on SIMV/VC 12/600/+5/PS 5/40%. Pt tolerating well, saturating appropriately.  At 15:50 pt placed on CPAP/PS 5/+5/40%.   At 16:30 ABG drawn and spontaneous parameters obtained, results as follow:    Sample Site Arterial Line     ABG pH 7.39    ABG pCO2 41 mm Hg    ABG PO2 102 High  mm Hg    ABG HCO3 24.7 mEq/L    Blood Gas Base Excess -0.2 mmol/L    Potassium Blood Gas 3.8 mmol/L    Sodium Blood Gas 143 mmol/L    Lactic Acid (Blood Gas) 2.9 High  mmol/L      01/08/25 1630   Spontaneous Breathing Trial   Spontaneous Breathing Trial Complete Y   Is the FiO2 <= 0.5? (titrated for sats 92-94%) Y   Is the PEEP <= 5? Y   Is the RSBI <=104 Y   Is the patient off pressor and narcotic / sedation drips? Y   Is the patient free of ventricular arrhythmias in the past 24 hours? Y   Is the patient's cough adequate? Y   Is the patient alert (neuro), able to follow commands? Y   Daily Screen Meets All Criteria Yes   Spontaneous Parameters   Spontaneous RR Rate 17   Spontaneous Minute Volume 8.4   Average Spontaneous Tidal Volume 531   $ Spontaneous Vital Capacity 896   Negative Inspiratory Force -31   Total RSBI 37   Weaning Trials   Patient self-extubated? No   Compassionate wean? No   Spontaneous Breathing Trial Time Initiated 1550   Spontaneous Breathing Trial Duration 40 min   Spontaneous Breathing Trial Method CPAP/PS   Spontaneous Breathing Trial Settings 5/+5/40%   Pre Trial HR 64   Pre Trial RR 18   Pre Trial SpO2 97 %   Pre Trial /65   Pre Trial Vt 442   Post Trial HR 65   Post Trial RR 17   Post Trial SpO2 96 %   Post Trial /65   Post Trial Vt 531       At 16:45 pt extubated to 4 lpm nasal cannula.

## 2025-01-09 NOTE — DISCHARGE INSTRUCTIONS
CARDIAC SURGERY DISCHARGE INSTRUCTIONS  Shower daily and clean your surgical incisions with soap and water then swab with Betadine 2 x day until your follow up office visit with Dr. Caruso.  Do not drive for one month  Do not lift/push/pull greater than 10 lbs for 3 months   Do not soak (submerge) your incision in water such as tub/pool/etc for one month  Take Plavix 75 mg daily with Aspirin 81 mg daily for 3 months. After 3 months- stop the Plavix and continue on Aspirin 81 mg daily.    You have an incidental solid left thyroid nodule noted on your pre op carotid ultrasound. Please discuss further with Dr. Samuels at your follow up appointment for ongoing surveillance which may include a baseline thyroid ultrasound.     Sometimes managing your health at home requires assistance.  The [facility] team has recognized your preference to use [facility], Phone: [phone] or Fax: [phone]. A representative from the home health agency will contact you or your family to schedule your first visit.

## 2025-01-10 LAB
ANION GAP SERPL CALC-SCNC: 5 MMOL/L (ref 0–18)
BASOPHILS # BLD AUTO: 0.01 X10(3) UL (ref 0–0.2)
BASOPHILS NFR BLD AUTO: 0.1 %
BLOOD TYPE BARCODE: 6200
BUN BLD-MCNC: 12 MG/DL (ref 9–23)
BUN/CREAT SERPL: 16 (ref 10–20)
CALCIUM BLD-MCNC: 8.7 MG/DL (ref 8.7–10.4)
CHLORIDE SERPL-SCNC: 107 MMOL/L (ref 98–112)
CO2 SERPL-SCNC: 29 MMOL/L (ref 21–32)
CREAT BLD-MCNC: 0.75 MG/DL
DEPRECATED RDW RBC AUTO: 42.5 FL (ref 35.1–46.3)
EGFRCR SERPLBLD CKD-EPI 2021: 93 ML/MIN/1.73M2 (ref 60–?)
EOSINOPHIL # BLD AUTO: 0 X10(3) UL (ref 0–0.7)
EOSINOPHIL NFR BLD AUTO: 0 %
ERYTHROCYTE [DISTWIDTH] IN BLOOD BY AUTOMATED COUNT: 13 % (ref 11–15)
GLUCOSE BLD-MCNC: 160 MG/DL (ref 70–99)
GLUCOSE BLDC GLUCOMTR-MCNC: 119 MG/DL (ref 70–99)
GLUCOSE BLDC GLUCOMTR-MCNC: 133 MG/DL (ref 70–99)
GLUCOSE BLDC GLUCOMTR-MCNC: 134 MG/DL (ref 70–99)
GLUCOSE BLDC GLUCOMTR-MCNC: 141 MG/DL (ref 70–99)
GLUCOSE BLDC GLUCOMTR-MCNC: 144 MG/DL (ref 70–99)
GLUCOSE BLDC GLUCOMTR-MCNC: 152 MG/DL (ref 70–99)
HCT VFR BLD AUTO: 32.5 %
HGB BLD-MCNC: 10.8 G/DL
IMM GRANULOCYTES # BLD AUTO: 0.09 X10(3) UL (ref 0–1)
IMM GRANULOCYTES NFR BLD: 0.9 %
LIPOPROTEIN (A): <8.4 NMOL/L
LYMPHOCYTES # BLD AUTO: 0.73 X10(3) UL (ref 1–4)
LYMPHOCYTES NFR BLD AUTO: 7 %
MCH RBC QN AUTO: 29.8 PG (ref 26–34)
MCHC RBC AUTO-ENTMCNC: 33.2 G/DL (ref 31–37)
MCV RBC AUTO: 89.5 FL
MONOCYTES # BLD AUTO: 0.75 X10(3) UL (ref 0.1–1)
MONOCYTES NFR BLD AUTO: 7.2 %
NEUTROPHILS # BLD AUTO: 8.86 X10 (3) UL (ref 1.5–7.7)
NEUTROPHILS # BLD AUTO: 8.86 X10(3) UL (ref 1.5–7.7)
NEUTROPHILS NFR BLD AUTO: 84.8 %
OSMOLALITY SERPL CALC.SUM OF ELEC: 295 MOSM/KG (ref 275–295)
PLATELET # BLD AUTO: 113 10(3)UL (ref 150–450)
PLATELETS.RETICULATED NFR BLD AUTO: 9.3 % (ref 0–7)
POTASSIUM SERPL-SCNC: 4.5 MMOL/L (ref 3.5–5.1)
RBC # BLD AUTO: 3.63 X10(6)UL
SODIUM SERPL-SCNC: 141 MMOL/L (ref 136–145)
UNIT VOLUME: 350 ML
WBC # BLD AUTO: 10.4 X10(3) UL (ref 4–11)

## 2025-01-10 PROCEDURE — 99232 SBSQ HOSP IP/OBS MODERATE 35: CPT | Performed by: INTERNAL MEDICINE

## 2025-01-10 PROCEDURE — 99233 SBSQ HOSP IP/OBS HIGH 50: CPT | Performed by: HOSPITALIST

## 2025-01-10 RX ORDER — FAMOTIDINE 20 MG/1
20 TABLET, FILM COATED ORAL 2 TIMES DAILY
Qty: 60 TABLET | Refills: 0 | Status: SHIPPED | OUTPATIENT
Start: 2025-01-10

## 2025-01-10 RX ORDER — CLOPIDOGREL BISULFATE 75 MG/1
75 TABLET ORAL DAILY
Qty: 90 TABLET | Refills: 0 | Status: SHIPPED | OUTPATIENT
Start: 2025-01-11

## 2025-01-10 RX ORDER — ASCORBIC ACID 500 MG
500 TABLET ORAL 2 TIMES DAILY
Qty: 60 TABLET | Refills: 0 | Status: SHIPPED | OUTPATIENT
Start: 2025-01-10

## 2025-01-10 RX ORDER — DOXEPIN HYDROCHLORIDE 50 MG/1
1 CAPSULE ORAL DAILY
Qty: 90 TABLET | Refills: 0 | Status: SHIPPED | OUTPATIENT
Start: 2025-01-11

## 2025-01-10 RX ORDER — TRAMADOL HYDROCHLORIDE 50 MG/1
50 TABLET ORAL EVERY 6 HOURS PRN
Qty: 30 TABLET | Refills: 0 | Status: SHIPPED | OUTPATIENT
Start: 2025-01-10

## 2025-01-10 RX ORDER — AMIODARONE HYDROCHLORIDE 200 MG/1
400 TABLET ORAL 2 TIMES DAILY WITH MEALS
Status: DISCONTINUED | OUTPATIENT
Start: 2025-01-10 | End: 2025-01-12

## 2025-01-10 RX ORDER — FUROSEMIDE 10 MG/ML
20 INJECTION INTRAMUSCULAR; INTRAVENOUS
Status: DISCONTINUED | OUTPATIENT
Start: 2025-01-10 | End: 2025-01-11

## 2025-01-10 RX ORDER — COLCHICINE 0.6 MG/1
0.6 TABLET ORAL DAILY
Status: DISCONTINUED | OUTPATIENT
Start: 2025-01-10 | End: 2025-01-12

## 2025-01-10 NOTE — PROGRESS NOTES
[facility]  Progress Note     Olimpia Medina  : 1965    Status: Inpatient  Day #: 2    Attending: Hieu Romero MD  PCP: Tori Samuels MD     Assessment and Plan:    Multivessel CAD s/p CABG  -cardiology, CT surgery, critical care following  -cont asa, plavix, metoprolol, statin  -chest tube to be removed today    HTN  -cont meds and monitor    HL  -cont statin    Hypothyroidism  -cont levothyroxine    H/o BILLINGSLEY        DVT Mechanical Prophylaxis:   SCDs,    DVT Pharmacologic Prophylaxis   Medication    enoxaparin (Lovenox) 40 MG/0.4ML SUBQ injection 40 mg         DVT Pharmacologic prophylaxis: Aspirin 81 mg     Subjective:      Initial Chief Complaint:  CAD    Pain controlled. No SOB.     10 point ROS completed and was negative, except for pertinent positive and negatives stated in subjective.      Objective:      Temp:  [98.4 °F (36.9 °C)-99 °F (37.2 °C)] 98.8 °F (37.1 °C)  Pulse:  [64-75] 75  Resp:  [12-20] 20  BP: (118-132)/(65-79) 125/68  SpO2:  [91 %-97 %] 93 %  General:  Alert, no distress  HEENT:  Normocephalic, atraumatic  Cardiac:  Regular rate, regular rhythm  Pulmonary:  Clear to auscultation bilaterally, respirations unlabored  Gastrointestinal:  Soft, non-tender, normal bowel sounds  Musculoskeletal:  No joint swelling  Extremities:  No edema, no cyanosis, no clubbing  Neurologic:  nonfocal  Psychiatric:  Normal affect, calm and appropriate    Intake/Output Summary (Last 24 hours) at 1/10/2025 1356  Last data filed at 1/10/2025 1100  Gross per 24 hour   Intake 2198.75 ml   Output 4265 ml   Net -2066.25 ml         Recent Labs   Lab 25  1221 25  0412 01/10/25  0515   WBC 7.7 11.3* 10.4   HGB 11.8* 11.5* 10.8*   HCT 33.0* 33.3* 32.5*   .0* 116.0* 113.0*   RBC 3.88 3.91 3.63*   MCV 85.1 85.2 89.5   MCH 30.4 29.4 29.8   MCHC 35.8 34.5 33.2   RDW 12.2 12.5 13.0   NEPRELIM  --  10.36* 8.86*     Recent Labs   Lab 25  1253 25  0412 01/10/25  0515   BUN 11 11  12   CREATSERUM 0.76 0.76 0.75   CA 7.5* 8.5* 8.7   * 145 141   K 3.6 3.9 4.5   * 112 107   CO2 26.0 25.0 29.0   * 135* 160*   MG 1.9 1.9  --    PTT 30.1  --   --    INR 1.22*  --   --        XR CHEST AP PORTABLE  (CPT=71045)    Result Date: 1/9/2025  CONCLUSION:   The endotracheal tube has been removed.   Unchanged [place]-Lucy catheter, right medial chest tube, and left basilar chest tube.  Unchanged epicardial drain.  Improved pulmonary edema.  Slightly decreased left pleural effusion.  No appreciable pneumothorax.  No new abnormality.        Dictated by (CST): Sebastian Lo MD on 1/09/2025 at 9:18 AM     Finalized by (CST): Sebastian Lo MD on 1/09/2025 at 9:20 AM         EKG 12 Lead    Result Date: 1/9/2025  Sinus rhythm with 1st degree A-V block Possible Inferior infarct , age undetermined Abnormal ECG When compared with ECG of 08-JAN-2025 12:35, ST elevation now present in Lateral leads Confirmed by JOSE MULLIGAN, MEADOWS ([id number]) on 1/9/2025 11:44:22 AM   Medications:   furosemide  20 mg Intravenous BID (Diuretic)    insulin aspart  1-5 Units Subcutaneous TID CC    levothyroxine  25 mcg Oral Before breakfast    rosuvastatin  40 mg Oral Nightly    sennosides  8.6 mg Oral BID    docusate sodium  100 mg Oral BID    metoclopramide  10 mg Intravenous Q6H    famotidine  20 mg Oral BID    metoprolol tartrate  25 mg Oral 2x Daily(Beta Blocker)    mupirocin  1 Application Nasal BID    chlorhexidine gluconate  15 mL Mouth/Throat BID    multivitamin  1 tablet Oral Daily    ascorbic acid  500 mg Oral TID    enoxaparin  40 mg Subcutaneous Daily    clopidogrel  75 mg Oral Daily    aspirin  81 mg Oral Daily      PRN Meds:   traMADol    melatonin    polyethylene glycol (PEG 3350)    bisacodyl    ondansetron    nitroGLYCERIN in dextrose 5%    potassium chloride **OR** potassium chloride    magnesium sulfate in dextrose 5%    magnesium sulfate in sterile water for injection    acetaminophen **OR**  HYDROcodone-acetaminophen **OR** HYDROcodone-acetaminophen    morphINE **OR** morphINE    glucose **OR** glucose **OR** glucose-vitamin C **OR** dextrose **OR** glucose **OR** glucose **OR** glucose-vitamin C    Supplementary Documentation:   DVT Mechanical Prophylaxis:   SCDs,    DVT Pharmacologic Prophylaxis   Medication    enoxaparin (Lovenox) 40 MG/0.4ML SUBQ injection 40 mg         DVT Pharmacologic prophylaxis: Aspirin 81 mg      Code Status: Full Code  Todd: No urinary catheter in place  Todd Duration (in days): 2  Central line:    BHAVIN:                     MDM High. Time spent on chart/note review, review of labs/imaging, discussion with patient, physical exam, discussion with staff, consultants, coordinating care, writing progress note, discussion of plan of care.      Hieu Romero MD

## 2025-01-10 NOTE — PLAN OF CARE
-sinus richa to sinus rhythm on monitor. Medicated with Morphine for pain. Pacer wires n place. No paced beats.  Prevena wound vac intact.  CT to suction with no air leak noted.  Up to the bathroom but no BM yet. Passing a lot of flatus. Uses call light appropriately.     Problem: Patient Centered Care  Goal: Patient preferences are identified and integrated in the patient's plan of care  Description: Interventions:  - What would you like us to know as we care for you? I like to travel  - Provide timely, complete, and accurate information to patient/family  - Incorporate patient and family knowledge, values, beliefs, and cultural backgrounds into the planning and delivery of care  - Encourage patient/family to participate in care and decision-making at the level they choose  - Honor patient and family perspectives and choices  Outcome: Progressing     Problem: Patient/Family Goals  Goal: Patient/Family Long Term Goal  Description: Patient's Long Term Goal: Go home upon discharge    Interventions:  - Monitor labs  - Administer medications  - Pain management  - Patient centered care  - Keep patient and family informed on plan of care  - See additional Care Plan goals for specific interventions  Outcome: Progressing  Goal: Patient/Family Short Term Goal  Description: Patient's Short Term Goal: manage pain    Interventions:   - PRN meds  - repositioning  - education  - See additional Care Plan goals for specific interventions  Outcome: Progressing     Problem: CARDIOVASCULAR - ADULT  Goal: Maintains optimal cardiac output and hemodynamic stability  Description: INTERVENTIONS:  - Monitor vital signs, rhythm, and trends  - Monitor for bleeding, hypotension and signs of decreased cardiac output  - Evaluate effectiveness of vasoactive medications to optimize hemodynamic stability  - Monitor arterial and/or venous puncture sites for bleeding and/or hematoma  - Assess quality of pulses, skin color and temperature  - Assess  for signs of decreased coronary artery perfusion - ex. Angina  - Evaluate fluid balance, assess for edema, trend weights  Outcome: Progressing  Goal: Absence of cardiac arrhythmias or at baseline  Description: INTERVENTIONS:  - Continuous cardiac monitoring, monitor vital signs, obtain 12 lead EKG if indicated  - Evaluate effectiveness of antiarrhythmic and heart rate control medications as ordered  - Initiate emergency measures for life threatening arrhythmias  - Monitor electrolytes and administer replacement therapy as ordered  Outcome: Progressing     Problem: RESPIRATORY - ADULT  Goal: Achieves optimal ventilation and oxygenation  Description: INTERVENTIONS:  - Assess for changes in respiratory status  - Assess for changes in mentation and behavior  - Position to facilitate oxygenation and minimize respiratory effort  - Oxygen supplementation based on oxygen saturation or ABGs  - Provide Smoking Cessation handout, if applicable  - Encourage broncho-pulmonary hygiene including cough, deep breathe, Incentive Spirometry  - Assess the need for suctioning and perform as needed  - Assess and instruct to report SOB or any respiratory difficulty  - Respiratory Therapy support as indicated  - Manage/alleviate anxiety  - Monitor for signs/symptoms of CO2 retention  Outcome: Progressing     Problem: GASTROINTESTINAL - ADULT  Goal: Minimal or absence of nausea and vomiting  Description: INTERVENTIONS:  - Maintain adequate hydration with IV or PO as ordered and tolerated  - Nasogastric tube to low intermittent suction as ordered  - Evaluate effectiveness of ordered antiemetic medications  - Provide nonpharmacologic comfort measures as appropriate  - Advance diet as tolerated, if ordered  - Obtain nutritional consult as needed  - Evaluate fluid balance  Outcome: Progressing  Goal: Maintains or returns to baseline bowel function  Description: INTERVENTIONS:  - Assess bowel function  - Maintain adequate hydration with IV or PO  as ordered and tolerated  - Evaluate effectiveness of GI medications  - Encourage mobilization and activity  - Obtain nutritional consult as needed  - Establish a toileting routine/schedule  - Consider collaborating with pharmacy to review patient's medication profile  Outcome: Progressing  Goal: Maintains adequate nutritional intake (undernourished)  Description: INTERVENTIONS:  - Monitor percentage of each meal consumed  - Identify factors contributing to decreased intake, treat as appropriate  - Assist with meals as needed  - Monitor I&O, WT and lab values  - Obtain nutritional consult as needed  - Optimize oral hygiene and moisture  - Encourage food from home; allow for food preferences  - Enhance eating environment  Outcome: Progressing  Goal: Achieves appropriate nutritional intake (bariatric)  Description: INTERVENTIONS:  - Monitor for over-consumption  - Identify factors contributing to increased intake, treat as appropriate  - Monitor I&O, WT and lab values  - Obtain nutritional consult as needed  - Evaluate psychosocial factors contributing to over-consumption  Outcome: Progressing     Problem: GENITOURINARY - ADULT  Goal: Absence of urinary retention  Description: INTERVENTIONS:  - Assess patient’s ability to void and empty bladder  - Monitor intake/output and perform bladder scan as needed  - Follow urinary retention protocol/standard of care  - Consider collaborating with pharmacy to review patient's medication profile  - Implement strategies to promote bladder emptying  Outcome: Progressing     Problem: METABOLIC/FLUID AND ELECTROLYTES - ADULT  Goal: Glucose maintained within prescribed range  Description: INTERVENTIONS:  - Monitor Blood Glucose as ordered  - Assess for signs and symptoms of hyperglycemia and hypoglycemia  - Administer ordered medications to maintain glucose within target range  - Assess barriers to adequate nutritional intake and initiate nutrition consult as needed  - Instruct  patient on self management of diabetes  Outcome: Progressing  Goal: Electrolytes maintained within normal limits  Description: INTERVENTIONS:  - Monitor labs and rhythm and assess patient for signs and symptoms of electrolyte imbalances  - Administer electrolyte replacement as ordered  - Monitor response to electrolyte replacements, including rhythm and repeat lab results as appropriate  - Fluid restriction as ordered  - Instruct patient on fluid and nutrition restrictions as appropriate  Outcome: Progressing     Problem: HEMATOLOGIC - ADULT  Goal: Maintains hematologic stability  Description: INTERVENTIONS  - Assess for signs and symptoms of bleeding or hemorrhage  - Monitor labs and vital signs for trends  - Administer supportive blood products/factors, fluids and medications as ordered and appropriate  - Administer supportive blood products/factors as ordered and appropriate  Outcome: Progressing  Goal: Free from bleeding injury  Description: (Example usage: patient with low platelets)  INTERVENTIONS:  - Avoid intramuscular injections, enemas and rectal medication administration  - Ensure safe mobilization of patient  - Hold pressure on venipuncture sites to achieve adequate hemostasis  - Assess for signs and symptoms of internal bleeding  - Monitor lab trends  - Patient is to report abnormal signs of bleeding to staff  - Avoid use of toothpicks and dental floss  - Use electric shaver for shaving  - Use soft bristle tooth brush  - Limit straining and forceful nose blowing  Outcome: Progressing

## 2025-01-10 NOTE — DIETARY NOTE
NUTRITION EDUCATION NOTE     Received consult for cardiac nutrition education. Verbally reviewed basic cardiac diet restrictions. Provided with Eating Heart-Healthy handout to reinforce. Receptive to instruction. May benefit from outpt f/u. Expect fair compliance. RD contact information provided to pt.         Nazia Turk RD, LDN  Clinical Dietitian  P: [phone]

## 2025-01-10 NOTE — PROGRESS NOTES
[facility]  part of [facility]     Progress Note    Olimpia Medina Patient Status:  Inpatient    1965 MRN [id number]   Location [facility] 2W/SW Attending Hieu Romero MD   Hosp Day # 2 PCP Tori Samuels MD       Subjective:   Patient seen and examined.  Resting in chair.  Pain well-controlled.  Denies significant dyspnea.  Tolerated some ambulation    Objective:   Blood pressure 123/72, pulse 70, temperature 99 °F (37.2 °C), temperature source Oral, resp. rate 20, height 5' 9\" (1.753 m), weight 239 lb 3.2 oz (108.5 kg), last menstrual period 01/15/2016, SpO2 95%, not currently breastfeeding.  Intake/Output:   Last 3 shifts: I/O last 3 completed shifts:  In: 3666.1 [P.O.:1560; I.V.:1406.1; IV PIGGYBACK:700]  Out: 3725 [Urine:3175; Chest Tube:550]   This shift: No intake/output data recorded.     Vent Settings:      Hemodynamic parameters (last 24 hours): PAP: (29)/(11-14) 29/11  CO:  [6.8 L/min-7.1 L/min] 6.8 L/min  CI:  [3.1 L/min/m2-3.2 L/min/m2] 3.1 L/min/m2    Scheduled Meds:   Current Facility-Administered Medications   Medication Dose Route Frequency    levothyroxine (Synthroid) tab 25 mcg  25 mcg Oral Before breakfast    rosuvastatin (Crestor) tab 40 mg  40 mg Oral Nightly    traMADol (Ultram) tab 50 mg  50 mg Oral Q6H PRN    sodium chloride 0.9% infusion  83 mL/hr Intravenous Continuous    insulin regular human (Novolin R, Humulin R) 100 Units in sodium chloride 0.9% 100 mL standard infusion (100 mL)  1-40 Units/hr Intravenous Continuous    sodium chloride 0.9% infusion  83 mL/hr Intravenous Continuous    sodium chloride 0.9% infusion   Intravenous Continuous    melatonin tab 3 mg  3 mg Oral Nightly PRN    sennosides (Senokot) tab 8.6 mg  8.6 mg Oral BID    docusate sodium (Colace) cap 100 mg  100 mg Oral BID    polyethylene glycol (PEG 3350) (Miralax) 17 g oral packet 17 g  17 g Oral Daily PRN    bisacodyl (Dulcolax) 10 MG rectal suppository 10 mg  10 mg Rectal Daily  PRN    metoclopramide (Reglan) 5 mg/mL injection 10 mg  10 mg Intravenous Q6H    ondansetron (Zofran) 4 MG/2ML injection 4 mg  4 mg Intravenous Q6H PRN    famotidine (Pepcid) tab 20 mg  20 mg Oral BID    Or    famotidine (Pepcid) 20 mg/2mL injection 20 mg  20 mg Intravenous BID    dexmedeTOMIDine in sodium chloride 0.9% (Precedex) 400 mcg/100mL infusion premix  0.2-1.5 mcg/kg/hr Intravenous Continuous    DOBUTamine in dextrose 5% (Dobutrex) 500 mg/250mL infusion premix  2.5-20 mcg/kg/min Intravenous Continuous PRN    nitroGLYCERIN in dextrose 5% 50 mg/250mL infusion premix  5-300 mcg/min Intravenous Continuous PRN    norepinephrine (Levophed) 4 mg/250mL infusion premix  0.5-30 mcg/min Intravenous Continuous PRN    metoprolol tartrate (Lopressor) tab 25 mg  25 mg Oral 2x Daily(Beta Blocker)    clevidipine (Cleviprex) 25 MG/50ML IV infusion  1-6 mg/hr Intravenous Continuous    potassium chloride 20 mEq/100mL IVPB premix 20 mEq  20 mEq Intravenous PRN    Or    potassium chloride 40 mEq/100mL IVPB premix (central line) 40 mEq  40 mEq Intravenous PRN    magnesium sulfate in dextrose 5% 1 g/100mL infusion premix 1 g  1 g Intravenous PRN    magnesium sulfate in sterile water for injection 2 g/50mL IVPB premix 2 g  2 g Intravenous PRN    mupirocin (Bactroban) 2% nasal ointment 1 Application  1 Application Nasal BID    chlorhexidine gluconate (Peridex) 0.12 % oral solution 15 mL  15 mL Mouth/Throat BID    multivitamin (Tab-A-Yoan/Beta Carotene) tab 1 tablet  1 tablet Oral Daily    ascorbic acid (Vitamin C) tab 500 mg  500 mg Oral TID    dextrose 5%-sodium chloride 0.45% infusion   mL/hr Intravenous Continuous    enoxaparin (Lovenox) 40 MG/0.4ML SUBQ injection 40 mg  40 mg Subcutaneous Daily    acetaminophen (Tylenol) tab 650 mg  650 mg Oral Q4H PRN    Or    HYDROcodone-acetaminophen (Norco) 5-325 MG per tab 1 tablet  1 tablet Oral Q4H PRN    Or    HYDROcodone-acetaminophen (Norco) 5-325 MG per tab 2 tablet  2 tablet  Oral Q4H PRN    morphINE PF 2 MG/ML injection 2 mg  2 mg Intravenous Q2H PRN    Or    morphINE PF 4 MG/ML injection 4 mg  4 mg Intravenous Q2H PRN    clopidogrel (Plavix) tab 75 mg  75 mg Oral Daily    aspirin DR tab 81 mg  81 mg Oral Daily    glucose (Dex4) 15 GM/59ML oral liquid 15 g  15 g Oral Q15 Min PRN    Or    glucose (Glutose) 40% oral gel 15 g  15 g Oral Q15 Min PRN    Or    glucose-vitamin C (Dex-4) chewable tab 4 tablet  4 tablet Oral Q15 Min PRN    Or    dextrose 50% injection 50 mL  50 mL Intravenous Q15 Min PRN    Or    glucose (Dex4) 15 GM/59ML oral liquid 30 g  30 g Oral Q15 Min PRN    Or    glucose (Glutose) 40% oral gel 30 g  30 g Oral Q15 Min PRN    Or    glucose-vitamin C (Dex-4) chewable tab 8 tablet  8 tablet Oral Q15 Min PRN    insulin regular human (Novolin R, Humulin R) 100 Units in sodium chloride 0.9% 100 mL standard infusion (100 mL)  1-28 Units/hr Intravenous Continuous       Continuous Infusions:    sodium chloride      insulin regular 2 Units/hr (01/08/25 1047)    sodium chloride 83 mL/hr (01/08/25 0600)    sodium chloride 10 mL/hr at 01/08/25 1305    dexmedetomidine      DOBUTamine Stopped (01/08/25 2100)    nitroGLYCERIN in dextrose 5% Stopped (01/08/25 1235)    norepinephrine 1 mcg/min (01/08/25 1345)    clevidipine      dextrose 5%-sodium chloride 0.45% 20 mL/hr (01/10/25 0600)    insulin regular 7 Units/hr (01/10/25 0600)       Physical Exam  Constitutional: no acute distress  Eyes: PERRL  ENT: nares pateint  Neck: supple, no JVD  Cardio: RRR, S1 S2  Respiratory: Faint basilar crackles  GI: abdomen soft, non tender, active bowel sounds, no organomegaly  Extremities: no clubbing, cyanosis, edema  Neurologic: no gross motor deficits  Skin: warm, dry      Results:     Lab Results   Component Value Date    WBC 10.4 01/10/2025    HGB 10.8 01/10/2025    HCT 32.5 01/10/2025    .0 01/10/2025    CREATSERUM 0.75 01/10/2025    BUN 12 01/10/2025     01/10/2025    K 4.5 01/10/2025      01/10/2025    CO2 29.0 01/10/2025     01/10/2025    CA 8.7 01/10/2025       XR CHEST AP PORTABLE  (CPT=71045)    Result Date: 1/9/2025  CONCLUSION:   The endotracheal tube has been removed.   Unchanged Geddes-Lucy catheter, right medial chest tube, and left basilar chest tube.  Unchanged epicardial drain.  Improved pulmonary edema.  Slightly decreased left pleural effusion.  No appreciable pneumothorax.  No new abnormality.        Dictated by (CST): Sebastian Lo MD on 1/09/2025 at 9:18 AM     Finalized by (CST): Sebastian Lo MD on 1/09/2025 at 9:20 AM          XR CHEST AP PORTABLE  (CPT=71045)    Result Date: 1/8/2025  CONCLUSION:  1. ET tube is high in position with the tip about 6 cm above the norma. 2. Swans Lucy catheter at junction between RVOT and main pulmonary artery pointing towards right. 3. Cardiomegaly with mild pulmonary venous congestion. 4. Chest tubes in place.  No pneumothorax.    Dictated by (CST): Kash Rivas MD on 1/08/2025 at 2:05 PM     Finalized by (CST): Kash Rivas MD on 1/08/2025 at 2:07 PM           EKG 12 Lead    Result Date: 1/9/2025  Sinus rhythm with 1st degree A-V block Possible Inferior infarct , age undetermined Abnormal ECG When compared with ECG of 08-JAN-2025 12:35, ST elevation now present in Lateral leads Confirmed by JOSE MULLIGAN, MEADOWS ([id number]) on 1/9/2025 11:44:22 AM    EKG 12 Lead    Result Date: 1/9/2025  Normal sinus rhythm Low voltage QRS, consider pulmonary disease, pericardial effusion, or normal variant Prolonged QT interval or tu fusion, consider myocardial disease, electrolyte imbalance, or drug effects Abnormal ECG When compared with ECG of 03-JAN-2025 08:09, QT has lengthened Confirmed by HEYDI MYRICK ([id number]) on 1/9/2025 7:30:50 AM     Assessment   1.  POD #2 status post CABG x 2  2.  Coronary artery disease  3.  Hypertension  4.  BILLINGSLEY  5.  Hyperlipidemia     Plan   -Patient presents for scheduled CABG x 2 on 1/8/2025.  Recent abnormal calcium  CT with subsequent cardiac cath with significant coronary artery disease seen.  -Tolerating extubation  -Up in chair.  Incentive spirometry.  -Aspirin, Plavix, beta-blocker, statin  -Possible chest tube removal later today  -Activity as tolerated  -DVT prophylaxis: Nancy Good, DO  Pulmonary Critical Care Medicine  [facility]

## 2025-01-10 NOTE — PROGRESS NOTES
[facility]  part of [facility]    Progress Note    Olimpia Medina Patient Status:  Inpatient    1965 MRN [id number]   Location [facility] 2W/SW Attending Hieu Romero MD   Hosp Day # 2 PCP Tori Samuels MD     Subjective:  OOB in chair on exam, no acute events noted overnight.  Mild to moderate incisional pain, controlled with current meds.  C/o insomnia. She currently denies: SOB, dizziness, or palpitations.  Friend currently visiting at bedside.    Objective:  /69 (BP Location: Right arm)   Pulse 65   Temp 98.4 °F (36.9 °C) (Oral)   Resp 16   Ht 5' 9\" (1.753 m)   Wt 239 lb 3.2 oz (108.5 kg)   LMP 01/15/2016 (Approximate)   SpO2 95%   BMI 35.32 kg/m²     Temp (24hrs), Av.9 °F (37.2 °C), Min:98.4 °F (36.9 °C), Max:99.3 °F (37.4 °C)  Telemetry-NSR    Intake/Output:    Intake/Output Summary (Last 24 hours) at 1/10/2025 0852  Last data filed at 1/10/2025 0800  Gross per 24 hour   Intake 2858.07 ml   Output 2630 ml   Net 228.07 ml       Wt Readings from Last 3 Encounters:   01/10/25 239 lb 3.2 oz (108.5 kg)   10/28/24 239 lb (108.4 kg)   24 241 lb 6.4 oz (109.5 kg)       Allergies:  Allergies[1]    Labs:  Lab Results   Component Value Date    WBC 10.4 01/10/2025    HGB 10.8 01/10/2025    HCT 32.5 01/10/2025    .0 01/10/2025    CREATSERUM 0.75 01/10/2025    BUN 12 01/10/2025     01/10/2025    K 4.5 01/10/2025     01/10/2025    CO2 29.0 01/10/2025     01/10/2025    CA 8.7 01/10/2025       Physical Exam:  Blood pressure 127/69, pulse 65, temperature 98.4 °F (36.9 °C), temperature source Oral, resp. rate 16, height 5' 9\" (1.753 m), weight 239 lb 3.2 oz (108.5 kg), last menstrual period 01/15/2016, SpO2 95%, not currently breastfeeding.  General: NAD  Neck: No JVD  Lungs: Diminished LLL, otherwise CTA bilateral  Chest tubes= 110cc/12 hours- no air leak noted  Heart: RRR, S1, S2  Abdomen: Soft/Round, NT/ND, BS+x 4/+ Flatus/no BM    Extremities: Warm, dry, no LE edema bilat  Pulses: 2+ bilat DP  Skin: sternotomy drsg: CDI w/TPW intact/L SVG site CDI  Neurological:  AAOx3, MAEW    Assessment/Plan:  S/P CABG X 2 POD # 2  Encourage pulm toilet: IS- able to perform approx 750cc w/IS. EZ apap. Stable on RA. CXR reviewed.   Pain meds as needed - wean off narcotics as tolerated  Increase activity-OOB to chair and ambulate in hallway   Scds/Lovenox prophylaxis DVT prevention   Hemodynamically stable requiring no vasoactive meds: remove CT/cordis/Todd today. Update PCU status, hospitalist already following  Expected post op volume overload: Lasix 20mg IV bid, monitor BMP, daily wt  Expected post op anemia w/o clinical significance/stable.   IV Insulin protocol. No hx: DM- transitioned off insulin gtt, no Hx DM preop, corrective scale only.  K/Mag replacement per protocol   Post op 12-lead EKG=SR   Hx: Thyroid disease- home med resumed post op. Incidental Left thyroid nodule noted on pre op carotid US/recommending thyroid US baseline exam. Pt asymptomatic. Discussed w/patient and recommend to further discuss w/PCP during her FU appt for on-going surveillance. She verbalized understanding.      Discharge planning: pt lives w/her SO. Anticipate home w/HH RN visits once medically stable - likely 48 hours.     Plan of care discussed w/patient/RN and CV Surgeon: Dr. Zuly KENDRICK  1/10/25  0900      Addendum:  Pt with frequent ectopy -per Dr. Caruso, start po Amio 400mg bid, colchicine for suspected PPS.         [1]   Allergies  Allergen Reactions    Pcns [Penicillins] HIVES     Patient has been on meropenem recently  Hives, age 24. Has had no exposure since.    Latex UNKNOWN     Added based on information entered during case entry, please review and add reactions, type, and severity as needed  Per pt. Denies latex allergy

## 2025-01-10 NOTE — PLAN OF CARE
Problem: Patient Centered Care  Goal: Patient preferences are identified and integrated in the patient's plan of care  Description: Interventions:  - What would you like us to know as we care for you? I like to travel  - Provide timely, complete, and accurate information to patient/family  - Incorporate patient and family knowledge, values, beliefs, and cultural backgrounds into the planning and delivery of care  - Encourage patient/family to participate in care and decision-making at the level they choose  - Honor patient and family perspectives and choices  Outcome: Progressing     Problem: CARDIOVASCULAR - ADULT  Goal: Maintains optimal cardiac output and hemodynamic stability  Description: INTERVENTIONS:  - Monitor vital signs, rhythm, and trends  - Monitor for bleeding, hypotension and signs of decreased cardiac output  - Evaluate effectiveness of vasoactive medications to optimize hemodynamic stability  - Monitor arterial and/or venous puncture sites for bleeding and/or hematoma  - Assess quality of pulses, skin color and temperature  - Assess for signs of decreased coronary artery perfusion - ex. Angina  - Evaluate fluid balance, assess for edema, trend weights  Outcome: Progressing  Goal: Absence of cardiac arrhythmias or at baseline  Description: INTERVENTIONS:  - Continuous cardiac monitoring, monitor vital signs, obtain 12 lead EKG if indicated  - Evaluate effectiveness of antiarrhythmic and heart rate control medications as ordered  - Initiate emergency measures for life threatening arrhythmias  - Monitor electrolytes and administer replacement therapy as ordered  Outcome: Progressing     Problem: RESPIRATORY - ADULT  Goal: Achieves optimal ventilation and oxygenation  Description: INTERVENTIONS:  - Assess for changes in respiratory status  - Assess for changes in mentation and behavior  - Position to facilitate oxygenation and minimize respiratory effort  - Oxygen supplementation based on oxygen  saturation or ABGs  - Provide Smoking Cessation handout, if applicable  - Encourage broncho-pulmonary hygiene including cough, deep breathe, Incentive Spirometry  - Assess the need for suctioning and perform as needed  - Assess and instruct to report SOB or any respiratory difficulty  - Respiratory Therapy support as indicated  - Manage/alleviate anxiety  - Monitor for signs/symptoms of CO2 retention  Outcome: Progressing     Problem: GASTROINTESTINAL - ADULT  Goal: Minimal or absence of nausea and vomiting  Description: INTERVENTIONS:  - Maintain adequate hydration with IV or PO as ordered and tolerated  - Nasogastric tube to low intermittent suction as ordered  - Evaluate effectiveness of ordered antiemetic medications  - Provide nonpharmacologic comfort measures as appropriate  - Advance diet as tolerated, if ordered  - Obtain nutritional consult as needed  - Evaluate fluid balance  Outcome: Progressing     Problem: GENITOURINARY - ADULT  Goal: Absence of urinary retention  Description: INTERVENTIONS:  - Assess patient’s ability to void and empty bladder  - Monitor intake/output and perform bladder scan as needed  - Follow urinary retention protocol/standard of care  - Consider collaborating with pharmacy to review patient's medication profile  - Implement strategies to promote bladder emptying  Outcome: Progressing     Problem: HEMATOLOGIC - ADULT  Goal: Free from bleeding injury  Description: (Example usage: patient with low platelets)  INTERVENTIONS:  - Avoid intramuscular injections, enemas and rectal medication administration  - Ensure safe mobilization of patient  - Hold pressure on venipuncture sites to achieve adequate hemostasis  - Assess for signs and symptoms of internal bleeding  - Monitor lab trends  - Patient is to report abnormal signs of bleeding to staff  - Avoid use of toothpicks and dental floss  - Use electric shaver for shaving  - Use soft bristle tooth brush  - Limit straining and forceful  nose blowing  Outcome: Progressing

## 2025-01-10 NOTE — PROGRESS NOTES
[facility]  part of [facility]    Cardiology Progress Note    Olimpia Medina Patient Status:  Inpatient    1965 MRN [id number]   Location [facility] 2W/SW Attending Hieu Romero MD   Hosp Day # 2 PCP Tori Samuels MD     Interval History   No sig events  Seated in chair  Denies any significant chest pain, shortness of breath  Telemetry unrevealing for significant arrhythmia    Assessment and Plan:   Multivessel CAD  Status post CABG  -LHC with severe distal LM and ostial OM disease  -pre-op LV function preserved (60-65%)  -s/p 2v CABG with LIMA-LAD and rSVG-RI  -currently hemodynamically stable, not on pressor support  -volume: on IV lasix 20mg BID, clinically appears compensated   -strict I/Os, closely monitor renal function / lytes  -rhythm: currently SR, monitor on tele  -BB and ASA  -high-intensity statin with rosuvastatin 40 mg daily  -CT management per CVS     Fhx premature CAD  -Lp(a) is pending     Objective:   Patient Vitals for the past 24 hrs:   BP Temp Temp src Pulse Resp SpO2   25 1200 (!) 127/93 98.7 °F (37.1 °C) Oral 69 15 95 %   25 1100 109/66 -- -- 63 12 96 %   25 1000 106/67 -- -- 62 15 95 %   25 0900 112/70 99.3 °F (37.4 °C) Pulmonary Ar 61 15 96 %   25 0800 128/71 99.5 °F (37.5 °C) Pulmonary Ar 76 19 95 %   25 0700 116/64 -- -- 69 15 93 %   25 0600 107/63 99.5 °F (37.5 °C) Pulmonary Ar 75 19 90 %   25 0500 114/62 99.4 °F (37.4 °C) Pulmonary Ar 76 17 94 %   25 0400 115/67 99.5 °F (37.5 °C) Pulmonary Ar 73 20 93 %   25 0300 121/65 99.3 °F (37.4 °C) Pulmonary Ar 74 17 94 %   25 0200 113/63 99.3 °F (37.4 °C) Pulmonary Ar 67 12 94 %   25 0130 -- -- -- 68 12 94 %   25 0100 123/65 99.4 °F (37.4 °C) Pulmonary Ar 68 12 95 %   25 0000 109/60 99.4 °F (37.4 °C) Pulmonary Ar 66 14 96 %   25 2300 106/61 99.5 °F (37.5 °C) Pulmonary Ar 64 12 95 %   25 2200 104/59 99.5 °F (37.5  °C) Pulmonary Ar 64 19 94 %   01/08/25 2100 140/65 99.4 °F (37.4 °C) Pulmonary Ar 73 17 96 %   01/08/25 2000 131/60 99.6 °F (37.6 °C) Pulmonary Ar 69 19 98 %   01/08/25 1900 133/63 -- -- 69 15 97 %   01/08/25 1800 120/63 -- -- 70 17 97 %   01/08/25 1700 131/61 99.5 °F (37.5 °C) Pulmonary Ar 67 17 94 %   01/08/25 1645 -- -- -- -- -- 95 %   01/08/25 1600 121/65 -- -- 64 15 97 %   01/08/25 1550 -- -- -- 64 14 97 %   01/08/25 1545 122/65 -- -- 64 12 97 %   01/08/25 1530 -- -- -- 64 12 97 %   01/08/25 1515 124/66 -- -- 64 12 99 %   01/08/25 1500 119/66 98.9 °F (37.2 °C) Pulmonary Ar 65 12 99 %   01/08/25 1445 -- -- -- 63 12 98 %       Intake/Output:   Last 3 shifts:   Intake/Output                   01/07/25 0700 - 01/08/25 0659 (Not Admitted) 01/08/25 0700 - 01/09/25 0659 01/09/25 0700 - 01/10/25 0659       Intake    P.O.  --  --  360    P.O. -- -- 360    I.V.  --  1358  7.2    I.V. -- 250 --    Volume (mL) Insulin -- 52 --    Volume (mL) Nitroglycerin -- 36 --    Volume (mL) Dobutamine -- 6 --    Volume (mL) Propofol -- 251 --    Volume (mL) Amiodarone -- 5 --    Volume (mL) Norepinephrine -- -- 7.2    Volume (mL) (sodium chloride 0.9% infusion) -- 100 --    Volume (mL) (dextrose 5%-sodium chloride 0.45% infusion) -- 658 --    IV PIGGYBACK  --  1000  --    Volume (mL) (acetaminophen (Ofirmev) 10 mg/mL infusion premix 1,000 mg) -- 300 --    Volume (mL) (potassium chloride 20 mEq/100mL IVPB premix 20 mEq) -- 100 --    Volume (mL) (albumin human (Albumin) 5% injection 12.5 g) -- 250 --    Volume (mL) (vancomycin (Vancocin) 1.5 g in sodium chloride 0.9% 250mL IVPB premix) -- 250 --    Volume (mL) (gentamicin (Garamycin) 400 mg in sodium chloride 0.9% 100 mL IVPB) -- 100 --    Total Intake -- 2358 367.2       Output    Urine  --  1776  285    Output (mL) (Urethral Catheter Latex;Double-lumen;Temperature probe) -- 1776 285    Chest Tube  --  620  100    Output (mL) (Y Chest Tube 1 and 2 Anterior Mediastinal 32 Fr. Anterior  Pleural 32 Fr.) -- 620 100    Total Output -- 2396 385       Net I/O     -- -38 -17.8             Vent Settings:      Hemodynamic parameters (last 24 hours):      Scheduled Meds:    furosemide  20 mg Intravenous BID (Diuretic)    insulin aspart  1-5 Units Subcutaneous TID CC    levothyroxine  25 mcg Oral Before breakfast    rosuvastatin  40 mg Oral Nightly    sennosides  8.6 mg Oral BID    docusate sodium  100 mg Oral BID    metoclopramide  10 mg Intravenous Q6H    famotidine  20 mg Oral BID    metoprolol tartrate  25 mg Oral 2x Daily(Beta Blocker)    mupirocin  1 Application Nasal BID    chlorhexidine gluconate  15 mL Mouth/Throat BID    multivitamin  1 tablet Oral Daily    ascorbic acid  500 mg Oral TID    enoxaparin  40 mg Subcutaneous Daily    clopidogrel  75 mg Oral Daily    aspirin  81 mg Oral Daily       Continuous Infusions:    nitroGLYCERIN in dextrose 5% Stopped (01/08/25 1235)       Results:     Lab Results   Component Value Date    WBC 10.4 01/10/2025    HGB 10.8 (L) 01/10/2025    HCT 32.5 (L) 01/10/2025    .0 (L) 01/10/2025    CREATSERUM 0.75 01/10/2025    BUN 12 01/10/2025     01/10/2025    K 4.5 01/10/2025     01/10/2025    CO2 29.0 01/10/2025     (H) 01/10/2025    CA 8.7 01/10/2025    ALB 4.6 01/03/2025    ALKPHO 48 01/03/2025    BILT 0.9 01/03/2025    TP 6.9 01/03/2025    AST 32 01/03/2025    ALT 74 (H) 01/03/2025    PTT 30.1 01/08/2025    INR 1.22 (H) 01/08/2025    T4F 0.9 01/11/2023    TSH 4.252 06/28/2024    ESRML 17 01/22/2020    MG 1.9 01/09/2025    PHOS 3.7 08/25/2018    B12 512 01/22/2020       Recent Labs   Lab 01/08/25  1253 01/09/25  0412 01/10/25  0515   * 135* 160*   BUN 11 11 12   CREATSERUM 0.76 0.76 0.75   CA 7.5* 8.5* 8.7   * 145 141   K 3.6 3.9 4.5   * 112 107   CO2 26.0 25.0 29.0     Recent Labs   Lab 01/08/25  1221 01/09/25  0412 01/10/25  0515   RBC 3.88 3.91 3.63*   HGB 11.8* 11.5* 10.8*   HCT 33.0* 33.3* 32.5*   MCV 85.1 85.2 89.5    MCH 30.4 29.4 29.8   MCHC 35.8 34.5 33.2   RDW 12.2 12.5 13.0   NEPRELIM  --  10.36* 8.86*   WBC 7.7 11.3* 10.4   .0* 116.0* 113.0*       No results for input(s): \"BNPML\" in the last 168 hours.    No results for input(s): \"TROP\", \"CK\" in the last 168 hours.    XR CHEST AP PORTABLE  (CPT=71045)    Result Date: 1/9/2025  CONCLUSION:   The endotracheal tube has been removed.   Unchanged [place]-Lucy catheter, right medial chest tube, and left basilar chest tube.  Unchanged epicardial drain.  Improved pulmonary edema.  Slightly decreased left pleural effusion.  No appreciable pneumothorax.  No new abnormality.        Dictated by (CST): Sebastian Lo MD on 1/09/2025 at 9:18 AM     Finalized by (CST): Sebastian Lo MD on 1/09/2025 at 9:20 AM          XR CHEST AP PORTABLE  (CPT=71045)    Result Date: 1/8/2025  CONCLUSION:  1. ET tube is high in position with the tip about 6 cm above the norma. 2. Swans Ulcy catheter at junction between RVOT and main pulmonary artery pointing towards right. 3. Cardiomegaly with mild pulmonary venous congestion. 4. Chest tubes in place.  No pneumothorax.    Dictated by (CST): Kash Rivas MD on 1/08/2025 at 2:05 PM     Finalized by (CST): Kash Rivas MD on 1/08/2025 at 2:07 PM         EKG 12 Lead    Result Date: 1/9/2025  Sinus rhythm with 1st degree A-V block Possible Inferior infarct , age undetermined Abnormal ECG When compared with ECG of 08-JAN-2025 12:35, ST elevation now present in Lateral leads Confirmed by JOSE MULLIGAN, MEADOWS ([id number]) on 1/9/2025 11:44:22 AM    EKG 12 Lead    Result Date: 1/9/2025  Normal sinus rhythm Low voltage QRS, consider pulmonary disease, pericardial effusion, or normal variant Prolonged QT interval or tu fusion, consider myocardial disease, electrolyte imbalance, or drug effects Abnormal ECG When compared with ECG of 03-JAN-2025 08:09, QT has lengthened Confirmed by HEYDI MYRICK ([id number]) on 1/9/2025 7:30:50 AM          Exam:     Physical  Exam:  General: Alert and oriented x 3. No apparent distress.   HEENT: Normocephalic, anicteric sclera, neck supple, no thyromegaly or adenopathy.  Neck: No JVD, carotids 2+, no bruits.  Cardiac: Regular rate and rhythm. S1, S2 normal.   Lungs: Clear without wheezes, rales, rhonchi or dullness.  Normal excursions and effort.  Abdomen: Soft, non-tender.   Extremities: Without clubbing or cyanosis.  Trace edema.    Neurologic: Alert and oriented, normal affect. No focal defects  Skin: Warm and dry.     Chacorta Muñoz, [facility] [place]

## 2025-01-11 ENCOUNTER — APPOINTMENT (OUTPATIENT)
Dept: GENERAL RADIOLOGY | Facility: HOSPITAL | Age: 60
End: 2025-01-11
Attending: NURSE PRACTITIONER
Payer: COMMERCIAL

## 2025-01-11 LAB
ANION GAP SERPL CALC-SCNC: 6 MMOL/L (ref 0–18)
BUN BLD-MCNC: 20 MG/DL (ref 9–23)
BUN/CREAT SERPL: 25 (ref 10–20)
CALCIUM BLD-MCNC: 8.7 MG/DL (ref 8.7–10.4)
CHLORIDE SERPL-SCNC: 105 MMOL/L (ref 98–112)
CO2 SERPL-SCNC: 32 MMOL/L (ref 21–32)
CREAT BLD-MCNC: 0.8 MG/DL
DEPRECATED RDW RBC AUTO: 41.1 FL (ref 35.1–46.3)
EGFRCR SERPLBLD CKD-EPI 2021: 86 ML/MIN/1.73M2 (ref 60–?)
ERYTHROCYTE [DISTWIDTH] IN BLOOD BY AUTOMATED COUNT: 12.7 % (ref 11–15)
GLUCOSE BLD-MCNC: 120 MG/DL (ref 70–99)
GLUCOSE BLDC GLUCOMTR-MCNC: 107 MG/DL (ref 70–99)
GLUCOSE BLDC GLUCOMTR-MCNC: 118 MG/DL (ref 70–99)
GLUCOSE BLDC GLUCOMTR-MCNC: 135 MG/DL (ref 70–99)
GLUCOSE BLDC GLUCOMTR-MCNC: 135 MG/DL (ref 70–99)
HCT VFR BLD AUTO: 29.9 %
HGB BLD-MCNC: 10.5 G/DL
MAGNESIUM SERPL-MCNC: 2 MG/DL (ref 1.6–2.6)
MCH RBC QN AUTO: 31 PG (ref 26–34)
MCHC RBC AUTO-ENTMCNC: 35.1 G/DL (ref 31–37)
MCV RBC AUTO: 88.2 FL
OSMOLALITY SERPL CALC.SUM OF ELEC: 300 MOSM/KG (ref 275–295)
PLATELET # BLD AUTO: 116 10(3)UL (ref 150–450)
PLATELETS.RETICULATED NFR BLD AUTO: 6.9 % (ref 0–7)
POTASSIUM SERPL-SCNC: 3.7 MMOL/L (ref 3.5–5.1)
RBC # BLD AUTO: 3.39 X10(6)UL
SODIUM SERPL-SCNC: 143 MMOL/L (ref 136–145)
WBC # BLD AUTO: 10.5 X10(3) UL (ref 4–11)

## 2025-01-11 PROCEDURE — 71046 X-RAY EXAM CHEST 2 VIEWS: CPT | Performed by: NURSE PRACTITIONER

## 2025-01-11 PROCEDURE — 99233 SBSQ HOSP IP/OBS HIGH 50: CPT | Performed by: HOSPITALIST

## 2025-01-11 PROCEDURE — 99233 SBSQ HOSP IP/OBS HIGH 50: CPT | Performed by: INTERNAL MEDICINE

## 2025-01-11 RX ORDER — POTASSIUM CHLORIDE 1500 MG/1
20 TABLET, EXTENDED RELEASE ORAL DAILY
Status: DISCONTINUED | OUTPATIENT
Start: 2025-01-11 | End: 2025-01-12

## 2025-01-11 RX ORDER — FUROSEMIDE 20 MG/1
20 TABLET ORAL DAILY
Status: DISCONTINUED | OUTPATIENT
Start: 2025-01-11 | End: 2025-01-12

## 2025-01-11 NOTE — PROGRESS NOTES
[facility]  part of [facility]    Cardiology Progress Note    Olimpia Medina Patient Status:  Inpatient    1965 MRN [id number]   Location [facility] 2W/SW Attending Hieu Romero MD   Hosp Day # 3 PCP Tori Samuels MD     Interval History   In chair and also walking in room this morning.  No chest pain, shortness of breath.  Feels PACs at times.    Assessment and Plan:   Multivessel CAD  Status post CABG  -C with severe distal LM and ostial OM disease  -pre-op LV function preserved (60-65%)  -s/p 2v CABG with LIMA-LAD and rSVG-RI  -weight down considerably, volume status near normal. Change to Lasix 20 mg PO once daily now.  -strict I/Os, closely monitor renal function / lytes  -rhythm: currently SR with PACs, contineu CV Surg amio protocol  -continue BB, ASA, rosuvastatin  -anticipate dismissal tomorrow     Fhx premature CAD  -Lp(a) is pending     Objective:   /73 (BP Location: Right arm)   Pulse 64   Temp 98.6 °F (37 °C) (Temporal)   Resp 13   Ht 5' 9\" (1.753 m)   Wt 231 lb 4.2 oz (104.9 kg)   LMP 01/15/2016 (Approximate)   SpO2 91%   BMI 34.15 kg/m²   GEN - no distress  HEENT - atraumatic  Neck - no masses  Lungs - nonlabored resps, clear bilat, normal symmetric excursion  CV - RRR, nl S1/S2, no precordial heave, no edema  Abd - nondistended  Ext - no cyanosis  Skin - no rash  Neuro - alert, oriented    Intake/Output Summary (Last 24 hours) at 2025 0624  Last data filed at 2025 0600  Gross per 24 hour   Intake 1127.88 ml   Output 2000 ml   Net -872.12 ml     Scheduled Meds:    furosemide  20 mg Intravenous BID (Diuretic)    insulin aspart  1-5 Units Subcutaneous TID CC    colchicine  0.6 mg Oral Daily    amiodarone  400 mg Oral BID with meals    levothyroxine  25 mcg Oral Before breakfast    rosuvastatin  40 mg Oral Nightly    sennosides  8.6 mg Oral BID    docusate sodium  100 mg Oral BID    metoclopramide  10 mg Intravenous Q6H    famotidine  20  mg Oral BID    metoprolol tartrate  25 mg Oral 2x Daily(Beta Blocker)    mupirocin  1 Application Nasal BID    chlorhexidine gluconate  15 mL Mouth/Throat BID    multivitamin  1 tablet Oral Daily    ascorbic acid  500 mg Oral TID    enoxaparin  40 mg Subcutaneous Daily    clopidogrel  75 mg Oral Daily    aspirin  81 mg Oral Daily       Lab Results   Component Value Date    WBC 10.5 01/11/2025    HGB 10.5 (L) 01/11/2025    HCT 29.9 (L) 01/11/2025    .0 (L) 01/11/2025    CREATSERUM 0.80 01/11/2025    BUN 20 01/11/2025     01/11/2025    K 3.7 01/11/2025     01/11/2025    CO2 32.0 01/11/2025     (H) 01/11/2025    CA 8.7 01/11/2025    ALB 4.6 01/03/2025    ALKPHO 48 01/03/2025    BILT 0.9 01/03/2025    TP 6.9 01/03/2025    AST 32 01/03/2025    ALT 74 (H) 01/03/2025    PTT 30.1 01/08/2025    INR 1.22 (H) 01/08/2025    T4F 0.9 01/11/2023    TSH 4.252 06/28/2024    ESRML 17 01/22/2020    MG 2.0 01/11/2025    PHOS 3.7 08/25/2018    B12 512 01/22/2020       XR CHEST AP PORTABLE  (CPT=71045)    Result Date: 1/9/2025  CONCLUSION:   The endotracheal tube has been removed.   Unchanged [place]-Lucy catheter, right medial chest tube, and left basilar chest tube.  Unchanged epicardial drain.  Improved pulmonary edema.  Slightly decreased left pleural effusion.  No appreciable pneumothorax.  No new abnormality.        Dictated by (CST): Sebastian Lo MD on 1/09/2025 at 9:18 AM     Finalized by (CST): Sebastian Lo MD on 1/09/2025 at 9:20 AM          XR CHEST AP PORTABLE  (CPT=71045)    Result Date: 1/8/2025  CONCLUSION:  1. ET tube is high in position with the tip about 6 cm above the norma. 2. Swans Lucy catheter at junction between RVOT and main pulmonary artery pointing towards right. 3. Cardiomegaly with mild pulmonary venous congestion. 4. Chest tubes in place.  No pneumothorax.    Dictated by (CST): Kash Rivas MD on 1/08/2025 at 2:05 PM     Finalized by (CST): Kash Rivas MD on 1/08/2025 at 2:07  EVETTE Wallace M.D.   [facility] - Cardiology/EP  1/11/2025   L3  -----------------------------------------

## 2025-01-11 NOTE — PROGRESS NOTES
[facility]  Progress Note     Olimpia Medina  : 1965    Status: Inpatient  Day #: 3    Attending: Hieu Romero MD  PCP: Tori Samuels MD     Assessment and Plan:    Multivessel CAD s/p CABG  -cardiology, CT surgery, critical care following  -cont asa, plavix, metoprolol, statin  -chest tube out, pacer wires to be removed    HTN  -cont meds and monitor    HL  -cont statin    Hypothyroidism  -cont levothyroxine    H/o [place]    Dispo: discharge planning for tomorrow        DVT Mechanical Prophylaxis:   SCDs,    DVT Pharmacologic Prophylaxis   Medication    enoxaparin (Lovenox) 40 MG/0.4ML SUBQ injection 40 mg         DVT Pharmacologic prophylaxis: Aspirin 81 mg     Subjective:      Initial Chief Complaint:  CAD    Pain controlled. No SOB.     10 point ROS completed and was negative, except for pertinent positive and negatives stated in subjective.      Objective:      Temp:  [97.8 °F (36.6 °C)-99 °F (37.2 °C)] 98.5 °F (36.9 °C)  Pulse:  [] 80  Resp:  [13-26] 24  BP: ()/(55-87) 149/77  SpO2:  [91 %-99 %] 96 %  General:  Alert, no distress  HEENT:  Normocephalic, atraumatic  Cardiac:  Regular rate, regular rhythm  Pulmonary:  Clear to auscultation bilaterally, respirations unlabored  Gastrointestinal:  Soft, non-tender, normal bowel sounds  Musculoskeletal:  No joint swelling  Extremities:  No edema, no cyanosis, no clubbing  Neurologic:  nonfocal  Psychiatric:  Normal affect, calm and appropriate    Intake/Output Summary (Last 24 hours) at 2025 1214  Last data filed at 2025 1200  Gross per 24 hour   Intake 1180 ml   Output --   Net 1180 ml         Recent Labs   Lab 25  0412 01/10/25  0515 25  0453   WBC 11.3* 10.4 10.5   HGB 11.5* 10.8* 10.5*   HCT 33.3* 32.5* 29.9*   .0* 113.0* 116.0*   RBC 3.91 3.63* 3.39*   MCV 85.2 89.5 88.2   MCH 29.4 29.8 31.0   MCHC 34.5 33.2 35.1   RDW 12.5 13.0 12.7   NEPRELIM 10.36* 8.86*  --      Recent Labs   Lab  01/08/25  1253 01/09/25  0412 01/10/25  0515 01/11/25  0453   BUN 11 11 12 20   CREATSERUM 0.76 0.76 0.75 0.80   CA 7.5* 8.5* 8.7 8.7   * 145 141 143   K 3.6 3.9 4.5 3.7   * 112 107 105   CO2 26.0 25.0 29.0 32.0   * 135* 160* 120*   MG 1.9 1.9  --  2.0   PTT 30.1  --   --   --    INR 1.22*  --   --   --        No results found.      Medications:   furosemide  20 mg Oral Daily    potassium chloride  20 mEq Oral Daily    insulin aspart  1-5 Units Subcutaneous TID CC    colchicine  0.6 mg Oral Daily    amiodarone  400 mg Oral BID with meals    levothyroxine  25 mcg Oral Before breakfast    rosuvastatin  40 mg Oral Nightly    sennosides  8.6 mg Oral BID    docusate sodium  100 mg Oral BID    metoclopramide  10 mg Intravenous Q6H    famotidine  20 mg Oral BID    metoprolol tartrate  25 mg Oral 2x Daily(Beta Blocker)    mupirocin  1 Application Nasal BID    chlorhexidine gluconate  15 mL Mouth/Throat BID    multivitamin  1 tablet Oral Daily    ascorbic acid  500 mg Oral TID    enoxaparin  40 mg Subcutaneous Daily    clopidogrel  75 mg Oral Daily    aspirin  81 mg Oral Daily      PRN Meds:   traMADol    melatonin    polyethylene glycol (PEG 3350)    bisacodyl    ondansetron    potassium chloride **OR** potassium chloride    magnesium sulfate in dextrose 5%    magnesium sulfate in sterile water for injection    acetaminophen **OR** HYDROcodone-acetaminophen **OR** HYDROcodone-acetaminophen    morphINE **OR** [DISCONTINUED] morphINE    glucose **OR** glucose **OR** glucose-vitamin C **OR** dextrose **OR** glucose **OR** glucose **OR** glucose-vitamin C    Supplementary Documentation:   DVT Mechanical Prophylaxis:   SCDs,    DVT Pharmacologic Prophylaxis   Medication    enoxaparin (Lovenox) 40 MG/0.4ML SUBQ injection 40 mg         DVT Pharmacologic prophylaxis: Aspirin 81 mg      Code Status: Full Code  Todd: No urinary catheter in place  Todd Duration (in days): 2  Central line:    BHAVIN:  1/12/2025                    MDM High. Time spent on chart/note review, review of labs/imaging, discussion with patient, physical exam, discussion with staff, consultants, coordinating care, writing progress note, discussion of plan of care.      Hieu Romero MD

## 2025-01-11 NOTE — PROGRESS NOTES
CV Surgery     S: Patient up walking, no complaints.     O:  /74 (BP Location: Right arm)   Pulse 71   Temp 98.6 °F (37 °C) (Temporal)   Resp 23   Ht 5' 9\" (1.753 m)   Wt 231 lb 4.2 oz (104.9 kg)   LMP 01/15/2016 (Approximate)   SpO2 96%   BMI 34.15 kg/m²      Gen A+O x3, NAD  CVS regular   Incision dressing c/d/I     A/P   Doing well.   - Continue amio, plavix, asa  - K bit low this AM, will add 20 PO KCL to go with lasix   - remove pacer wires  - Likely home tomorrow     IBETH Cooper MD

## 2025-01-11 NOTE — PLAN OF CARE
Problem: Patient Centered Care  Goal: Patient preferences are identified and integrated in the patient's plan of care  Description: Interventions:  - What would you like us to know as we care for you? I like to travel  - Provide timely, complete, and accurate information to patient/family  - Incorporate patient and family knowledge, values, beliefs, and cultural backgrounds into the planning and delivery of care  - Encourage patient/family to participate in care and decision-making at the level they choose  - Honor patient and family perspectives and choices  Outcome: Progressing     Problem: Patient/Family Goals  Goal: Patient/Family Long Term Goal  Description: Patient's Long Term Goal: Go home upon discharge    Interventions:  - Monitor labs  - Administer medications  - Pain management  - Patient centered care  - Keep patient and family informed on plan of care  - See additional Care Plan goals for specific interventions  Outcome: Progressing  Goal: Patient/Family Short Term Goal  Description: Patient's Short Term Goal: manage pain    Interventions:   - PRN meds  - repositioning  - education  - See additional Care Plan goals for specific interventions  Outcome: Progressing     Problem: CARDIOVASCULAR - ADULT  Goal: Maintains optimal cardiac output and hemodynamic stability  Description: INTERVENTIONS:  - Monitor vital signs, rhythm, and trends  - Monitor for bleeding, hypotension and signs of decreased cardiac output  - Evaluate effectiveness of vasoactive medications to optimize hemodynamic stability  - Monitor arterial and/or venous puncture sites for bleeding and/or hematoma  - Assess quality of pulses, skin color and temperature  - Assess for signs of decreased coronary artery perfusion - ex. Angina  - Evaluate fluid balance, assess for edema, trend weights  Outcome: Progressing  Goal: Absence of cardiac arrhythmias or at baseline  Description: INTERVENTIONS:  - Continuous cardiac monitoring, monitor vital  signs, obtain 12 lead EKG if indicated  - Evaluate effectiveness of antiarrhythmic and heart rate control medications as ordered  - Initiate emergency measures for life threatening arrhythmias  - Monitor electrolytes and administer replacement therapy as ordered  Outcome: Progressing     Problem: RESPIRATORY - ADULT  Goal: Achieves optimal ventilation and oxygenation  Description: INTERVENTIONS:  - Assess for changes in respiratory status  - Assess for changes in mentation and behavior  - Position to facilitate oxygenation and minimize respiratory effort  - Oxygen supplementation based on oxygen saturation or ABGs  - Provide Smoking Cessation handout, if applicable  - Encourage broncho-pulmonary hygiene including cough, deep breathe, Incentive Spirometry  - Assess the need for suctioning and perform as needed  - Assess and instruct to report SOB or any respiratory difficulty  - Respiratory Therapy support as indicated  - Manage/alleviate anxiety  - Monitor for signs/symptoms of CO2 retention  Outcome: Progressing     Problem: GASTROINTESTINAL - ADULT  Goal: Minimal or absence of nausea and vomiting  Description: INTERVENTIONS:  - Maintain adequate hydration with IV or PO as ordered and tolerated  - Nasogastric tube to low intermittent suction as ordered  - Evaluate effectiveness of ordered antiemetic medications  - Provide nonpharmacologic comfort measures as appropriate  - Advance diet as tolerated, if ordered  - Obtain nutritional consult as needed  - Evaluate fluid balance  Outcome: Progressing  Goal: Maintains or returns to baseline bowel function  Description: INTERVENTIONS:  - Assess bowel function  - Maintain adequate hydration with IV or PO as ordered and tolerated  - Evaluate effectiveness of GI medications  - Encourage mobilization and activity  - Obtain nutritional consult as needed  - Establish a toileting routine/schedule  - Consider collaborating with pharmacy to review patient's medication  profile  Outcome: Progressing  Goal: Maintains adequate nutritional intake (undernourished)  Description: INTERVENTIONS:  - Monitor percentage of each meal consumed  - Identify factors contributing to decreased intake, treat as appropriate  - Assist with meals as needed  - Monitor I&O, WT and lab values  - Obtain nutritional consult as needed  - Optimize oral hygiene and moisture  - Encourage food from home; allow for food preferences  - Enhance eating environment  Outcome: Progressing  Goal: Achieves appropriate nutritional intake (bariatric)  Description: INTERVENTIONS:  - Monitor for over-consumption  - Identify factors contributing to increased intake, treat as appropriate  - Monitor I&O, WT and lab values  - Obtain nutritional consult as needed  - Evaluate psychosocial factors contributing to over-consumption  Outcome: Progressing     Problem: GENITOURINARY - ADULT  Goal: Absence of urinary retention  Description: INTERVENTIONS:  - Assess patient’s ability to void and empty bladder  - Monitor intake/output and perform bladder scan as needed  - Follow urinary retention protocol/standard of care  - Consider collaborating with pharmacy to review patient's medication profile  - Implement strategies to promote bladder emptying  Outcome: Progressing     Problem: METABOLIC/FLUID AND ELECTROLYTES - ADULT  Goal: Glucose maintained within prescribed range  Description: INTERVENTIONS:  - Monitor Blood Glucose as ordered  - Assess for signs and symptoms of hyperglycemia and hypoglycemia  - Administer ordered medications to maintain glucose within target range  - Assess barriers to adequate nutritional intake and initiate nutrition consult as needed  - Instruct patient on self management of diabetes  Outcome: Progressing  Goal: Electrolytes maintained within normal limits  Description: INTERVENTIONS:  - Monitor labs and rhythm and assess patient for signs and symptoms of electrolyte imbalances  - Administer electrolyte  replacement as ordered  - Monitor response to electrolyte replacements, including rhythm and repeat lab results as appropriate  - Fluid restriction as ordered  - Instruct patient on fluid and nutrition restrictions as appropriate  Outcome: Progressing     Problem: HEMATOLOGIC - ADULT  Goal: Maintains hematologic stability  Description: INTERVENTIONS  - Assess for signs and symptoms of bleeding or hemorrhage  - Monitor labs and vital signs for trends  - Administer supportive blood products/factors, fluids and medications as ordered and appropriate  - Administer supportive blood products/factors as ordered and appropriate  Outcome: Progressing  Goal: Free from bleeding injury  Description: (Example usage: patient with low platelets)  INTERVENTIONS:  - Avoid intramuscular injections, enemas and rectal medication administration  - Ensure safe mobilization of patient  - Hold pressure on venipuncture sites to achieve adequate hemostasis  - Assess for signs and symptoms of internal bleeding  - Monitor lab trends  - Patient is to report abnormal signs of bleeding to staff  - Avoid use of toothpicks and dental floss  - Use electric shaver for shaving  - Use soft bristle tooth brush  - Limit straining and forceful nose blowing  Outcome: Progressing

## 2025-01-11 NOTE — PROGRESS NOTES
[facility]  part of [facility]    Progress Note      Assessment and Plan:   1.  Multivessel coronary artery disease status post coronary artery bypass grafting x 2 vessels.  Postoperative day #3.  Doing very well clinically.    Recommendations:  1.  Aggressive antipain, antithrombosis and antiatelectasis measures.  2.  As per CV surgery and cardiology  3.  Will follow clinically.    2.  DVT prophylaxis-Lovenox    3.  BI[place]      Subjective:   Olimpia Medina is a(n) 59 year old female who is breathing comfortably and in good spirits    Objective:   Blood pressure 151/74, pulse 71, temperature 98.5 °F (36.9 °C), temperature source Temporal, resp. rate 21, height 5' 9\" (1.753 m), weight 231 lb 4.2 oz (104.9 kg), last menstrual period 01/15/2016, SpO2 96%, not currently breastfeeding.    Physical Exam alert white female  HEENT examination is unremarkable with pupils equal round and reactive to light and accommodation.   Neck without adenopathy, thyromegaly, JVD nor bruit.   Lungs slightly diminished to auscultation and percussion.  Cardiac regular rate and rhythm no murmur.   Abdomen nontender, without hepatosplenomegaly and no mass appreciable.   Extremities without clubbing cyanosis nor edema.   Neurologic grossly intact with symmetric tone and strength and reflex.  Skin without gross abnormality     Results:     Lab Results   Component Value Date    WBC 10.5 01/11/2025    HGB 10.5 01/11/2025    HCT 29.9 01/11/2025    .0 01/11/2025    CREATSERUM 0.80 01/11/2025    BUN 20 01/11/2025     01/11/2025    K 3.7 01/11/2025     01/11/2025    CO2 32.0 01/11/2025     01/11/2025    CA 8.7 01/11/2025    MG 2.0 01/11/2025       Ignacio Montejo MD  Medical Director, Critical Care, [facility]  Medical Director, [facility]  Pager: [phone]

## 2025-01-11 NOTE — PLAN OF CARE
Pacing wires pulled. Showered. Possible discharge home tomorrow   Problem: Patient Centered Care  Goal: Patient preferences are identified and integrated in the patient's plan of care  Description: Interventions:  - What would you like us to know as we care for you? I like to travel  - Provide timely, complete, and accurate information to patient/family  - Incorporate patient and family knowledge, values, beliefs, and cultural backgrounds into the planning and delivery of care  - Encourage patient/family to participate in care and decision-making at the level they choose  - Honor patient and family perspectives and choices  Outcome: Progressing     Problem: Patient/Family Goals  Goal: Patient/Family Long Term Goal  Description: Patient's Long Term Goal: Go home upon discharge    Interventions:  - Monitor labs  - Administer medications  - Pain management  - Patient centered care  - Keep patient and family informed on plan of care  - See additional Care Plan goals for specific interventions  Outcome: Progressing  Goal: Patient/Family Short Term Goal  Description: Patient's Short Term Goal: manage pain    Interventions:   - PRN meds  - repositioning  - education  - See additional Care Plan goals for specific interventions  Outcome: Progressing     Problem: CARDIOVASCULAR - ADULT  Goal: Maintains optimal cardiac output and hemodynamic stability  Description: INTERVENTIONS:  - Monitor vital signs, rhythm, and trends  - Monitor for bleeding, hypotension and signs of decreased cardiac output  - Evaluate effectiveness of vasoactive medications to optimize hemodynamic stability  - Monitor arterial and/or venous puncture sites for bleeding and/or hematoma  - Assess quality of pulses, skin color and temperature  - Assess for signs of decreased coronary artery perfusion - ex. Angina  - Evaluate fluid balance, assess for edema, trend weights  Outcome: Progressing  Goal: Absence of cardiac arrhythmias or at baseline  Description:  INTERVENTIONS:  - Continuous cardiac monitoring, monitor vital signs, obtain 12 lead EKG if indicated  - Evaluate effectiveness of antiarrhythmic and heart rate control medications as ordered  - Initiate emergency measures for life threatening arrhythmias  - Monitor electrolytes and administer replacement therapy as ordered  Outcome: Progressing     Problem: RESPIRATORY - ADULT  Goal: Achieves optimal ventilation and oxygenation  Description: INTERVENTIONS:  - Assess for changes in respiratory status  - Assess for changes in mentation and behavior  - Position to facilitate oxygenation and minimize respiratory effort  - Oxygen supplementation based on oxygen saturation or ABGs  - Provide Smoking Cessation handout, if applicable  - Encourage broncho-pulmonary hygiene including cough, deep breathe, Incentive Spirometry  - Assess the need for suctioning and perform as needed  - Assess and instruct to report SOB or any respiratory difficulty  - Respiratory Therapy support as indicated  - Manage/alleviate anxiety  - Monitor for signs/symptoms of CO2 retention  Outcome: Progressing     Problem: GASTROINTESTINAL - ADULT  Goal: Minimal or absence of nausea and vomiting  Description: INTERVENTIONS:  - Maintain adequate hydration with IV or PO as ordered and tolerated  - Nasogastric tube to low intermittent suction as ordered  - Evaluate effectiveness of ordered antiemetic medications  - Provide nonpharmacologic comfort measures as appropriate  - Advance diet as tolerated, if ordered  - Obtain nutritional consult as needed  - Evaluate fluid balance  Outcome: Progressing  Goal: Maintains or returns to baseline bowel function  Description: INTERVENTIONS:  - Assess bowel function  - Maintain adequate hydration with IV or PO as ordered and tolerated  - Evaluate effectiveness of GI medications  - Encourage mobilization and activity  - Obtain nutritional consult as needed  - Establish a toileting routine/schedule  - Consider  collaborating with pharmacy to review patient's medication profile  Outcome: Progressing  Goal: Maintains adequate nutritional intake (undernourished)  Description: INTERVENTIONS:  - Monitor percentage of each meal consumed  - Identify factors contributing to decreased intake, treat as appropriate  - Assist with meals as needed  - Monitor I&O, WT and lab values  - Obtain nutritional consult as needed  - Optimize oral hygiene and moisture  - Encourage food from home; allow for food preferences  - Enhance eating environment  Outcome: Progressing  Goal: Achieves appropriate nutritional intake (bariatric)  Description: INTERVENTIONS:  - Monitor for over-consumption  - Identify factors contributing to increased intake, treat as appropriate  - Monitor I&O, WT and lab values  - Obtain nutritional consult as needed  - Evaluate psychosocial factors contributing to over-consumption  Outcome: Progressing     Problem: GENITOURINARY - ADULT  Goal: Absence of urinary retention  Description: INTERVENTIONS:  - Assess patient’s ability to void and empty bladder  - Monitor intake/output and perform bladder scan as needed  - Follow urinary retention protocol/standard of care  - Consider collaborating with pharmacy to review patient's medication profile  - Implement strategies to promote bladder emptying  Outcome: Progressing     Problem: METABOLIC/FLUID AND ELECTROLYTES - ADULT  Goal: Glucose maintained within prescribed range  Description: INTERVENTIONS:  - Monitor Blood Glucose as ordered  - Assess for signs and symptoms of hyperglycemia and hypoglycemia  - Administer ordered medications to maintain glucose within target range  - Assess barriers to adequate nutritional intake and initiate nutrition consult as needed  - Instruct patient on self management of diabetes  Outcome: Progressing  Goal: Electrolytes maintained within normal limits  Description: INTERVENTIONS:  - Monitor labs and rhythm and assess patient for signs and  symptoms of electrolyte imbalances  - Administer electrolyte replacement as ordered  - Monitor response to electrolyte replacements, including rhythm and repeat lab results as appropriate  - Fluid restriction as ordered  - Instruct patient on fluid and nutrition restrictions as appropriate  Outcome: Progressing     Problem: HEMATOLOGIC - ADULT  Goal: Maintains hematologic stability  Description: INTERVENTIONS  - Assess for signs and symptoms of bleeding or hemorrhage  - Monitor labs and vital signs for trends  - Administer supportive blood products/factors, fluids and medications as ordered and appropriate  - Administer supportive blood products/factors as ordered and appropriate  Outcome: Progressing  Goal: Free from bleeding injury  Description: (Example usage: patient with low platelets)  INTERVENTIONS:  - Avoid intramuscular injections, enemas and rectal medication administration  - Ensure safe mobilization of patient  - Hold pressure on venipuncture sites to achieve adequate hemostasis  - Assess for signs and symptoms of internal bleeding  - Monitor lab trends  - Patient is to report abnormal signs of bleeding to staff  - Avoid use of toothpicks and dental floss  - Use electric shaver for shaving  - Use soft bristle tooth brush  - Limit straining and forceful nose blowing  Outcome: Progressing     Problem: Diabetes/Glucose Control  Goal: Glucose maintained within prescribed range  Description: INTERVENTIONS:  - Monitor Blood Glucose as ordered  - Assess for signs and symptoms of hyperglycemia and hypoglycemia  - Administer ordered medications to maintain glucose within target range  - Assess barriers to adequate nutritional intake and initiate nutrition consult as needed  - Instruct patient on self management of diabetes  Outcome: Progressing

## 2025-01-12 VITALS
BODY MASS INDEX: 33.6 KG/M2 | OXYGEN SATURATION: 94 % | SYSTOLIC BLOOD PRESSURE: 121 MMHG | TEMPERATURE: 98 F | HEIGHT: 69 IN | DIASTOLIC BLOOD PRESSURE: 62 MMHG | RESPIRATION RATE: 14 BRPM | WEIGHT: 226.88 LBS | HEART RATE: 58 BPM

## 2025-01-12 LAB
ANION GAP SERPL CALC-SCNC: 7 MMOL/L (ref 0–18)
BASOPHILS # BLD AUTO: 0.02 X10(3) UL (ref 0–0.2)
BASOPHILS NFR BLD AUTO: 0.2 %
BUN BLD-MCNC: 16 MG/DL (ref 9–23)
BUN/CREAT SERPL: 20.8 (ref 10–20)
CALCIUM BLD-MCNC: 9.1 MG/DL (ref 8.7–10.4)
CHLORIDE SERPL-SCNC: 108 MMOL/L (ref 98–112)
CO2 SERPL-SCNC: 29 MMOL/L (ref 21–32)
CREAT BLD-MCNC: 0.77 MG/DL
DEPRECATED RDW RBC AUTO: 39.3 FL (ref 35.1–46.3)
EGFRCR SERPLBLD CKD-EPI 2021: 90 ML/MIN/1.73M2 (ref 60–?)
EOSINOPHIL # BLD AUTO: 0.13 X10(3) UL (ref 0–0.7)
EOSINOPHIL NFR BLD AUTO: 1.5 %
ERYTHROCYTE [DISTWIDTH] IN BLOOD BY AUTOMATED COUNT: 12.5 % (ref 11–15)
GLUCOSE BLD-MCNC: 107 MG/DL (ref 70–99)
GLUCOSE BLDC GLUCOMTR-MCNC: 102 MG/DL (ref 70–99)
GLUCOSE BLDC GLUCOMTR-MCNC: 95 MG/DL (ref 70–99)
HCT VFR BLD AUTO: 32.8 %
HGB BLD-MCNC: 11.4 G/DL
IMM GRANULOCYTES # BLD AUTO: 0.06 X10(3) UL (ref 0–1)
IMM GRANULOCYTES NFR BLD: 0.7 %
LYMPHOCYTES # BLD AUTO: 2.27 X10(3) UL (ref 1–4)
LYMPHOCYTES NFR BLD AUTO: 26.1 %
MCH RBC QN AUTO: 30 PG (ref 26–34)
MCHC RBC AUTO-ENTMCNC: 34.8 G/DL (ref 31–37)
MCV RBC AUTO: 86.3 FL
MONOCYTES # BLD AUTO: 0.67 X10(3) UL (ref 0.1–1)
MONOCYTES NFR BLD AUTO: 7.7 %
NEUTROPHILS # BLD AUTO: 5.54 X10 (3) UL (ref 1.5–7.7)
NEUTROPHILS # BLD AUTO: 5.54 X10(3) UL (ref 1.5–7.7)
NEUTROPHILS NFR BLD AUTO: 63.8 %
OSMOLALITY SERPL CALC.SUM OF ELEC: 300 MOSM/KG (ref 275–295)
PLATELET # BLD AUTO: 139 10(3)UL (ref 150–450)
PLATELETS.RETICULATED NFR BLD AUTO: 9.5 % (ref 0–7)
POTASSIUM SERPL-SCNC: 3.6 MMOL/L (ref 3.5–5.1)
RBC # BLD AUTO: 3.8 X10(6)UL
SODIUM SERPL-SCNC: 144 MMOL/L (ref 136–145)
WBC # BLD AUTO: 8.7 X10(3) UL (ref 4–11)

## 2025-01-12 PROCEDURE — 99239 HOSP IP/OBS DSCHRG MGMT >30: CPT | Performed by: HOSPITALIST

## 2025-01-12 PROCEDURE — 99233 SBSQ HOSP IP/OBS HIGH 50: CPT | Performed by: INTERNAL MEDICINE

## 2025-01-12 RX ORDER — FUROSEMIDE 20 MG/1
20 TABLET ORAL DAILY
Qty: 14 TABLET | Refills: 0 | Status: SHIPPED | OUTPATIENT
Start: 2025-01-13

## 2025-01-12 RX ORDER — AMIODARONE HYDROCHLORIDE 400 MG/1
400 TABLET ORAL 2 TIMES DAILY WITH MEALS
Qty: 28 TABLET | Refills: 0 | Status: SHIPPED | OUTPATIENT
Start: 2025-01-12

## 2025-01-12 RX ORDER — POTASSIUM CHLORIDE 1500 MG/1
20 TABLET, EXTENDED RELEASE ORAL DAILY
Qty: 30 TABLET | Refills: 0 | Status: SHIPPED | OUTPATIENT
Start: 2025-01-13

## 2025-01-12 RX ORDER — METOPROLOL TARTRATE 25 MG/1
25 TABLET, FILM COATED ORAL
Qty: 60 TABLET | Refills: 0 | Status: SHIPPED | OUTPATIENT
Start: 2025-01-12

## 2025-01-12 RX ORDER — COLCHICINE 0.6 MG/1
0.6 TABLET ORAL DAILY
Qty: 30 TABLET | Refills: 0 | Status: SHIPPED | OUTPATIENT
Start: 2025-01-13

## 2025-01-12 NOTE — PROGRESS NOTES
CV Surgery     Patient up at bedside.  No new issues.  Eager to go home.     /61   Pulse 55   Temp 97.6 °F (36.4 °C) (Temporal)   Resp 13   Ht 5' 9\" (1.753 m)   Wt 226 lb 13.7 oz (102.9 kg)   LMP 01/15/2016 (Approximate)   SpO2 94%   BMI 33.50 kg/m²      Incision C/D/I     A/P   - Doing well, ok for discharge home from CV point of view  - Some diarrhea overnight, better now.  If returns advised patient to call so we can hold colchicine.   - All questions answered.     Ok to discharge home if cleared by other consultants.     IBETH Cooper MD

## 2025-01-12 NOTE — PROGRESS NOTES
[facility]  part of [facility]    Cardiology Progress Note    Olimpia Medina Patient Status:  Inpatient    1965 MRN [id number]   Location [facility] 2W/SW Attending Hieu Romero MD   Hosp Day # 4 PCP Tori Samuels MD     Interval History   No sig events  Denies any significant chest pain, shortness of breath  Ambulating without any significant limitations  Telemetry unrevealing for significant arrhythmia    Assessment and Plan:   Multivessel CAD  Status post CABG  -LHC with severe distal LM and ostial OM disease  -pre-op LV function preserved (60-65%)  -s/p 2v CABG with LIMA-LAD and rSVG-RI  -currently hemodynamically stable, not on pressor support  -volume: Currently appears well compensated, no evidence of overload, on maintenance diuresis with Lasix  -rhythm: currently SR, monitor on tele  -BB and ASA  -high-intensity statin with rosuvastatin 40 mg daily  -Cardiac rehab     Fhx premature CAD  -Lp(a) is not elevated  -Continue aggressive lipid-lowering and ASCVD risk management     Objective:   Patient Vitals for the past 24 hrs:   BP Temp Temp src Pulse Resp SpO2   25 1200 (!) 127/93 98.7 °F (37.1 °C) Oral 69 15 95 %   25 1100 109/66 -- -- 63 12 96 %   25 1000 106/67 -- -- 62 15 95 %   25 0900 112/70 99.3 °F (37.4 °C) Pulmonary Ar 61 15 96 %   25 0800 128/71 99.5 °F (37.5 °C) Pulmonary Ar 76 19 95 %   25 0700 116/64 -- -- 69 15 93 %   25 0600 107/63 99.5 °F (37.5 °C) Pulmonary Ar 75 19 90 %   25 0500 114/62 99.4 °F (37.4 °C) Pulmonary Ar 76 17 94 %   25 0400 115/67 99.5 °F (37.5 °C) Pulmonary Ar 73 20 93 %   25 0300 121/65 99.3 °F (37.4 °C) Pulmonary Ar 74 17 94 %   25 0200 113/63 99.3 °F (37.4 °C) Pulmonary Ar 67 12 94 %   25 0130 -- -- -- 68 12 94 %   25 0100 123/65 99.4 °F (37.4 °C) Pulmonary Ar 68 12 95 %   25 0000 109/60 99.4 °F (37.4 °C) Pulmonary Ar 66 14 96 %   25 2300 106/61  99.5 °F (37.5 °C) Pulmonary Ar 64 12 95 %   01/08/25 2200 104/59 99.5 °F (37.5 °C) Pulmonary Ar 64 19 94 %   01/08/25 2100 140/65 99.4 °F (37.4 °C) Pulmonary Ar 73 17 96 %   01/08/25 2000 131/60 99.6 °F (37.6 °C) Pulmonary Ar 69 19 98 %   01/08/25 1900 133/63 -- -- 69 15 97 %   01/08/25 1800 120/63 -- -- 70 17 97 %   01/08/25 1700 131/61 99.5 °F (37.5 °C) Pulmonary Ar 67 17 94 %   01/08/25 1645 -- -- -- -- -- 95 %   01/08/25 1600 121/65 -- -- 64 15 97 %   01/08/25 1550 -- -- -- 64 14 97 %   01/08/25 1545 122/65 -- -- 64 12 97 %   01/08/25 1530 -- -- -- 64 12 97 %   01/08/25 1515 124/66 -- -- 64 12 99 %   01/08/25 1500 119/66 98.9 °F (37.2 °C) Pulmonary Ar 65 12 99 %   01/08/25 1445 -- -- -- 63 12 98 %       Intake/Output:   Last 3 shifts:   Intake/Output                   01/07/25 0700 - 01/08/25 0659 (Not Admitted) 01/08/25 0700 - 01/09/25 0659 01/09/25 0700 - 01/10/25 0659       Intake    P.O.  --  --  360    P.O. -- -- 360    I.V.  --  1358  7.2    I.V. -- 250 --    Volume (mL) Insulin -- 52 --    Volume (mL) Nitroglycerin -- 36 --    Volume (mL) Dobutamine -- 6 --    Volume (mL) Propofol -- 251 --    Volume (mL) Amiodarone -- 5 --    Volume (mL) Norepinephrine -- -- 7.2    Volume (mL) (sodium chloride 0.9% infusion) -- 100 --    Volume (mL) (dextrose 5%-sodium chloride 0.45% infusion) -- 658 --    IV PIGGYBACK  --  1000  --    Volume (mL) (acetaminophen (Ofirmev) 10 mg/mL infusion premix 1,000 mg) -- 300 --    Volume (mL) (potassium chloride 20 mEq/100mL IVPB premix 20 mEq) -- 100 --    Volume (mL) (albumin human (Albumin) 5% injection 12.5 g) -- 250 --    Volume (mL) (vancomycin (Vancocin) 1.5 g in sodium chloride 0.9% 250mL IVPB premix) -- 250 --    Volume (mL) (gentamicin (Garamycin) 400 mg in sodium chloride 0.9% 100 mL IVPB) -- 100 --    Total Intake -- 2358 367.2       Output    Urine  --  1776  285    Output (mL) (Urethral Catheter Latex;Double-lumen;Temperature probe) -- 1776 285    Chest Tube  --  620   100    Output (mL) (Y Chest Tube 1 and 2 Anterior Mediastinal 32 Fr. Anterior Pleural 32 Fr.) -- 620 100    Total Output -- 2396 385       Net I/O     -- -38 -17.8             Vent Settings:      Hemodynamic parameters (last 24 hours):      Scheduled Meds:    furosemide  20 mg Oral Daily    potassium chloride  20 mEq Oral Daily    insulin aspart  1-5 Units Subcutaneous TID CC    colchicine  0.6 mg Oral Daily    amiodarone  400 mg Oral BID with meals    levothyroxine  25 mcg Oral Before breakfast    rosuvastatin  40 mg Oral Nightly    sennosides  8.6 mg Oral BID    docusate sodium  100 mg Oral BID    metoclopramide  10 mg Intravenous Q6H    famotidine  20 mg Oral BID    metoprolol tartrate  25 mg Oral 2x Daily(Beta Blocker)    mupirocin  1 Application Nasal BID    chlorhexidine gluconate  15 mL Mouth/Throat BID    multivitamin  1 tablet Oral Daily    ascorbic acid  500 mg Oral TID    enoxaparin  40 mg Subcutaneous Daily    clopidogrel  75 mg Oral Daily    aspirin  81 mg Oral Daily       Continuous Infusions:         Results:     Lab Results   Component Value Date    WBC 8.7 01/12/2025    HGB 11.4 (L) 01/12/2025    HCT 32.8 (L) 01/12/2025    .0 (L) 01/12/2025    CREATSERUM 0.77 01/12/2025    BUN 16 01/12/2025     01/12/2025    K 3.6 01/12/2025     01/12/2025    CO2 29.0 01/12/2025     (H) 01/12/2025    CA 9.1 01/12/2025    ALB 4.6 01/03/2025    ALKPHO 48 01/03/2025    BILT 0.9 01/03/2025    TP 6.9 01/03/2025    AST 32 01/03/2025    ALT 74 (H) 01/03/2025    PTT 30.1 01/08/2025    INR 1.22 (H) 01/08/2025    T4F 0.9 01/11/2023    TSH 4.252 06/28/2024    ESRML 17 01/22/2020    MG 2.0 01/11/2025    PHOS 3.7 08/25/2018    B12 512 01/22/2020       Recent Labs   Lab 01/10/25  0515 01/11/25  0453 01/12/25  0439   * 120* 107*   BUN 12 20 16   CREATSERUM 0.75 0.80 0.77   CA 8.7 8.7 9.1    143 144   K 4.5 3.7 3.6    105 108   CO2 29.0 32.0 29.0     Recent Labs   Lab 01/09/25  0412  01/10/25  0515 01/11/25  0453 01/12/25  0439   RBC 3.91 3.63* 3.39* 3.80   HGB 11.5* 10.8* 10.5* 11.4*   HCT 33.3* 32.5* 29.9* 32.8*   MCV 85.2 89.5 88.2 86.3   MCH 29.4 29.8 31.0 30.0   MCHC 34.5 33.2 35.1 34.8   RDW 12.5 13.0 12.7 12.5   NEPRELIM 10.36* 8.86*  --  5.54   WBC 11.3* 10.4 10.5 8.7   .0* 113.0* 116.0* 139.0*       No results for input(s): \"BNPML\" in the last 168 hours.    No results for input(s): \"TROP\", \"CK\" in the last 168 hours.    XR CHEST PA + LAT CHEST (CPT=71046)    Result Date: 1/12/2025  CONCLUSION:  1. Post coronary artery bypass chest with stable cardiomegaly. 2. Left basilar pleural effusion with compressive atelectasis.  No pneumothorax.    Dictated by (CST): Kash Rivas MD on 1/12/2025 at 0:06 AM     Finalized by (CST): Kash Rivas MD on 1/12/2025 at 0:08 AM                    Exam:     Physical Exam:  General: Alert and oriented x 3. No apparent distress.   HEENT: Normocephalic, anicteric sclera, neck supple, no thyromegaly or adenopathy.  Neck: No JVD, carotids 2+, no bruits.  Cardiac: Regular rate and rhythm. S1, S2 normal.   Lungs: Clear without wheezes, rales, rhonchi or dullness.  Normal excursions and effort.  Abdomen: Soft, non-tender.   Extremities: Without clubbing or cyanosis no edema.    Neurologic: Alert and oriented, normal affect. No focal defects  Skin: Warm and dry.     Chacorta Muñoz, [facility] [place]

## 2025-01-12 NOTE — PROGRESS NOTES
Patient was discharged home with family. All belongings went with family. Answered all questions patient had. Went over the discharge instructions as well. Removed patients piv from her left hand. Took patient down to car where family was waiting.

## 2025-01-12 NOTE — PROGRESS NOTES
[facility]  part of [facility]    Progress Note      Assessment and Plan:   1.  Multivessel coronary artery disease status post coronary artery bypass grafting x 2 vessels.  Postoperative day #4.  Doing very well clinically.  No new complaints, sitting up in bed and breathing comfortably.  Anticipate home today.  Will sign off.  Please call if we can help further.    Recommendations:  1.  Aggressive antipain, antithrombosis and antiatelectasis measures.  2.  As per CV surgery and cardiology  3.  Will follow clinically.    2.  DVT prophylaxis-Lovenox    3.  BILLINGSLEY      Subjective:   Olimpia Medina is a(n) 59 year old female who is breathing comfortably and in good spirits.    Objective:   Blood pressure 116/61, pulse 55, temperature 97.6 °F (36.4 °C), temperature source Temporal, resp. rate 13, height 5' 9\" (1.753 m), weight 226 lb 13.7 oz (102.9 kg), last menstrual period 01/15/2016, SpO2 94%, not currently breastfeeding.    Physical Exam alert white female  HEENT examination is unremarkable with pupils equal round and reactive to light and accommodation.   Neck without adenopathy, thyromegaly, JVD nor bruit.   Lungs slightly diminished to auscultation and percussion.  Cardiac regular rate and rhythm no murmur.   Abdomen nontender, without hepatosplenomegaly and no mass appreciable.   Extremities without clubbing cyanosis nor edema.   Neurologic grossly intact with symmetric tone and strength and reflex.  Skin without gross abnormality     Results:     Lab Results   Component Value Date    WBC 8.7 01/12/2025    HGB 11.4 01/12/2025    HCT 32.8 01/12/2025    .0 01/12/2025    CREATSERUM 0.77 01/12/2025    BUN 16 01/12/2025     01/12/2025    K 3.6 01/12/2025     01/12/2025    CO2 29.0 01/12/2025     01/12/2025    CA 9.1 01/12/2025       Ignacio Montejo MD  Medical Director, Critical Care, [facility]  Medical Director, [facility]  Pager: [phone]

## 2025-01-12 NOTE — PROGRESS NOTES
[facility]  Progress Note     Olimpia Medina  : 1965    Status: Inpatient  Day #: 4    Attending: Hieu Romero MD  PCP: Tori Samuels MD     Assessment and Plan:    Multivessel CAD s/p CABG  -cardiology, CT surgery, critical care following  -cont asa, plavix, metoprolol, statin    HTN  -cont meds and monitor    HL  -cont statin    Hypothyroidism  -cont levothyroxine    H/o BILLINGSLEY    Dispo: discharge anticipated today        DVT Mechanical Prophylaxis:   SCDs,    DVT Pharmacologic Prophylaxis   Medication    enoxaparin (Lovenox) 40 MG/0.4ML SUBQ injection 40 mg         DVT Pharmacologic prophylaxis: Aspirin 81 mg     Subjective:      Initial Chief Complaint:  CAD    Pain controlled. No SOB.     10 point ROS completed and was negative, except for pertinent positive and negatives stated in subjective.      Objective:      Temp:  [97.1 °F (36.2 °C)-98.5 °F (36.9 °C)] 97.7 °F (36.5 °C)  Pulse:  [55-84] 58  Resp:  [13-26] 14  BP: (108-151)/(60-80) 121/62  SpO2:  [93 %-96 %] 94 %  General:  Alert, no distress  HEENT:  Normocephalic, atraumatic  Cardiac:  Regular rate, regular rhythm  Pulmonary:  Clear to auscultation bilaterally, respirations unlabored  Gastrointestinal:  Soft, non-tender, normal bowel sounds  Musculoskeletal:  No joint swelling  Extremities:  No edema, no cyanosis, no clubbing  Neurologic:  nonfocal  Psychiatric:  Normal affect, calm and appropriate    Intake/Output Summary (Last 24 hours) at 2025 1225  Last data filed at 2025 0800  Gross per 24 hour   Intake 540 ml   Output --   Net 540 ml         Recent Labs   Lab 25  0412 01/10/25  0515 25  0453 25  0439   WBC 11.3* 10.4 10.5 8.7   HGB 11.5* 10.8* 10.5* 11.4*   HCT 33.3* 32.5* 29.9* 32.8*   .0* 113.0* 116.0* 139.0*   RBC 3.91 3.63* 3.39* 3.80   MCV 85.2 89.5 88.2 86.3   MCH 29.4 29.8 31.0 30.0   MCHC 34.5 33.2 35.1 34.8   RDW 12.5 13.0 12.7 12.5   NEPRELIM 10.36* 8.86*  --  5.54     Recent Labs    Lab 01/08/25  1253 01/09/25  0412 01/10/25  0515 01/11/25  0453 01/12/25  0439   BUN 11 11 12 20 16   CREATSERUM 0.76 0.76 0.75 0.80 0.77   CA 7.5* 8.5* 8.7 8.7 9.1   * 145 141 143 144   K 3.6 3.9 4.5 3.7 3.6   * 112 107 105 108   CO2 26.0 25.0 29.0 32.0 29.0   * 135* 160* 120* 107*   MG 1.9 1.9  --  2.0  --    PTT 30.1  --   --   --   --    INR 1.22*  --   --   --   --        XR CHEST PA + LAT CHEST (CPT=71046)    Result Date: 1/12/2025  CONCLUSION:  1. Post coronary artery bypass chest with stable cardiomegaly. 2. Left basilar pleural effusion with compressive atelectasis.  No pneumothorax.    Dictated by (CST): Kash Rivas MD on 1/12/2025 at 0:06 AM     Finalized by (CST): Kash Rivas MD on 1/12/2025 at 0:08 AM             Medications:   furosemide  20 mg Oral Daily    potassium chloride  20 mEq Oral Daily    insulin aspart  1-5 Units Subcutaneous TID CC    colchicine  0.6 mg Oral Daily    amiodarone  400 mg Oral BID with meals    levothyroxine  25 mcg Oral Before breakfast    rosuvastatin  40 mg Oral Nightly    sennosides  8.6 mg Oral BID    docusate sodium  100 mg Oral BID    metoclopramide  10 mg Intravenous Q6H    famotidine  20 mg Oral BID    metoprolol tartrate  25 mg Oral 2x Daily(Beta Blocker)    mupirocin  1 Application Nasal BID    chlorhexidine gluconate  15 mL Mouth/Throat BID    multivitamin  1 tablet Oral Daily    ascorbic acid  500 mg Oral TID    enoxaparin  40 mg Subcutaneous Daily    clopidogrel  75 mg Oral Daily    aspirin  81 mg Oral Daily      PRN Meds:   traMADol    melatonin    polyethylene glycol (PEG 3350)    bisacodyl    ondansetron    potassium chloride **OR** potassium chloride    magnesium sulfate in dextrose 5%    magnesium sulfate in sterile water for injection    acetaminophen **OR** HYDROcodone-acetaminophen **OR** HYDROcodone-acetaminophen    morphINE **OR** [DISCONTINUED] morphINE    glucose **OR** glucose **OR** glucose-vitamin C **OR** dextrose **OR**  glucose **OR** glucose **OR** glucose-vitamin C    Supplementary Documentation:   DVT Mechanical Prophylaxis:   SCDs,    DVT Pharmacologic Prophylaxis   Medication    enoxaparin (Lovenox) 40 MG/0.4ML SUBQ injection 40 mg         DVT Pharmacologic prophylaxis: Aspirin 81 mg      Code Status: Full Code  Todd: No urinary catheter in place  Todd Duration (in days): 2  Central line:    BHAVIN: 1/12/2025                    [facility]. Time spent on chart/note review, review of labs/imaging, discussion with patient, physical exam, discussion with staff, consultants, coordinating care, writing progress note, discussion of plan of care.      Hieu Romero MD

## 2025-01-12 NOTE — DISCHARGE SUMMARY
[facility]  Discharge Summary     Olimpia Medina  : 1965    Status: Inpatient  Day #: 4    Attending: Hieu Romero MD  PCP: Tori Samuels MD     Date of Admission:  2025  Date of Discharge:  2025     Hospital Discharge Diagnoses:     Multivessel CAD s/p CABG   HTN  HL  Hypothyroidism  BILLINGSLEY      History of Present Illness:     Copied from Admission H&P:    Olimpia Medina is a a(n) 59 year old female.  History of hypertension hyperlipidemia and nonalcoholic steatohepatitis.  Denies any history of chest pain, chest pressure, back pain, nausea or vomiting.  Underwent a screening calcium coronary scan for positive family history of coronary artery disease and found to have an elevated score.  This prompted referral to cardiology where stress test was ordered and was abnormal.  This prompted angiography demonstrating severe multivessel coronary artery disease including a 60-70% lesion left main and ostial circumflex lesion greater than 70%.  We are asked to evaluate for surgical revascularization options.  Patient denies symptoms as above.  She does report 4-5 episodes approximately 4 to 5 years ago where her heart rate will reach 180 and sustained there for approximately 15 to 30 minutes.  This was associated with either exercise, 1 episode where she was having a alcoholic drink and another episode when she knew she was dehydrated.  Has not been diagnosed with atrial fibrillation or arrhythmia otherwise.  Has seen cardiology at the time.         Hospital Course:     Multivessel CAD s/p CABG  -cardiology, CT surgery, critical care following  -cont asa, plavix, metoprolol, statin  -doing very well post-op     HTN  -cont meds and monitor     HL  -cont statin     Hypothyroidism  -cont levothyroxine     H/o ANALILIA     Consultants         Provider   Role Specialty     Rajinder Heath DO      Consulting Physician PULMONARY DISEASES     Melanie Carty MD      Consulting Physician  HOSPITALIST     Evan Mallory MD      Consulting Physician CARDIOLOGY          Surgical Procedures       Case IDs Date Procedure Surgeon Location Status    [id number] 1/8/25 CORONARY ARTERY BYPASS GRAFT X2; UTILIZING THE LEFT INTERNAL MAMMARY ARTERY TO THE LAD; SVG TO THE RAMUS; LEFT LEG ENDOSCOPIC GREATER SAPHENOUS VEIN GRAFT HARVEST; INTRAOPERATIVE TRANSESOPHAGEAL ECHOCARDIOGRAM; INSERTION OF TEMPORARY VENTRICULAR PACING WIRE; Alfredito Caruso MD [facility] MAIN OR Comp           Physical Exam:   Blood pressure 121/62, pulse 58, temperature 97.7 °F (36.5 °C), temperature source Temporal, resp. rate 14, height 5' 9\" (1.753 m), weight 226 lb 13.7 oz (102.9 kg), last menstrual period 01/15/2016, SpO2 94%, not currently breastfeeding.  General:  Alert, no distress  HEENT:  Normocephalic, atraumatic  Cardiac:  Regular rate, regular rhythm  Pulmonary:  Clear to auscultation bilaterally, respirations unlabored  Gastrointestinal:  Soft, non-tender, normal bowel sounds  Musculoskeletal:  No joint swelling  Extremities:  No edema, no cyanosis, no clubbing  Neurologic:  nonfocal  Psychiatric:  Normal affect, calm and appropriate         Discharge Medications        START taking these medications        Instructions Prescription details   Amiodarone HCl 400 MG Tabs  Commonly known as: PACERONE      Take 1 tablet (400 mg total) by mouth 2 (two) times daily with meals.   Quantity: 28 tablet  Refills: 0     ascorbic acid 500 MG Tabs  Commonly known as: Vitamin C      Take 1 tablet (500 mg total) by mouth 2 (two) times daily.   Quantity: 60 tablet  Refills: 0     clopidogrel 75 MG Tabs  Commonly known as: Plavix      Take 1 tablet (75 mg total) by mouth daily.   Quantity: 90 tablet  Refills: 0     colchicine 0.6 MG Tabs  Start taking on: January 13, 2025      Take 1 tablet (0.6 mg total) by mouth daily.   Quantity: 30 tablet  Refills: 0     famotidine 20 MG Tabs  Commonly known as: Pepcid      Take 1 tablet (20 mg total) by mouth 2 (two)  times daily.   Quantity: 60 tablet  Refills: 0     furosemide 20 MG Tabs  Commonly known as: Lasix  Start taking on: January 13, 2025      Take 1 tablet (20 mg total) by mouth daily.   Quantity: 14 tablet  Refills: 0     metoprolol tartrate 25 MG Tabs  Commonly known as: Lopressor      Take 1 tablet (25 mg total) by mouth 2x Daily(Beta Blocker).   Quantity: 60 tablet  Refills: 0     multivitamin Tabs      Take 1 tablet by mouth daily.   Quantity: 90 tablet  Refills: 0     potassium chloride 20 MEQ Tbcr  Commonly known as: Klor-Con M20  Start taking on: January 13, 2025      Take 1 tablet (20 mEq total) by mouth daily.   Quantity: 30 tablet  Refills: 0     traMADol 50 MG Tabs  Commonly known as: Ultram      Take 1 tablet (50 mg total) by mouth every 6 (six) hours as needed.   Quantity: 30 tablet  Refills: 0            CONTINUE taking these medications        Instructions Prescription details   aspirin 81 MG Tbec      Take 1 tablet (81 mg total) by mouth daily.   Refills: 0     ergocalciferol 1.25 MG (88861 UT) Caps  Commonly known as: Vitamin D2      Take 1 capsule (50,000 Units total) by mouth once a week.   Quantity: 12 capsule  Refills: 0     levothyroxine 25 MCG Tabs  Commonly known as: Synthroid      Take 1 tablet (25 mcg total) by mouth before breakfast.   Refills: 0     rosuvastatin 40 MG Tabs  Commonly known as: Crestor      Take 1 tablet (40 mg total) by mouth nightly.   Stop taking on: January 29, 2025  Quantity: 90 tablet  Refills: 1            STOP taking these medications      ketoconazole 2 % Crea  Commonly known as: Nizoral        losartan 50 MG Tabs  Commonly known as: Cozaar        Na Sulfate-K Sulfate-Mg Sulf 17.5-3.13-1.6 GM/177ML Soln  Commonly known as: Suprep Bowel Prep Kit        Zepbound 5 MG/0.5ML Soln  Generic drug: Tirzepatide-Weight Management                  Where to Get Your Medications        These medications were sent to [id number] - [place], [place] - [address]  [phone], [phone]  [address], [place] [address]      Phone: [phone]   Amiodarone HCl 400 MG Tabs  ascorbic acid 500 MG Tabs  clopidogrel 75 MG Tabs  colchicine 0.6 MG Tabs  famotidine 20 MG Tabs  furosemide 20 MG Tabs  metoprolol tartrate 25 MG Tabs  multivitamin Tabs  potassium chloride 20 MEQ Tbcr  traMADol 50 MG Tabs        Follow-up Information       Elisa Flores APRN Follow up on 1/20/2025.    Specialty: Nurse Practitioner  Why: Hospital follow-up appointment @ 10:30am  Contact information:  [address] [place] [address]  [facility] [id number] [phone]               Alfredito Caruso MD Follow up on 1/29/2025.    Specialty: Surgery, Thoracic  Why: Hospital follow-up appointment @ 1:30pm  Contact information:  [address]  [address]  [facility] [address]  [phone]               Evan Mallory MD Follow up on 2/4/2025.    Specialty: CARDIOLOGY  Why: Hospital follow-up appointment @ 3pm  Contact information:  [address] [place] [address]  [facility] [address]  [phone]               Tori Samuels MD Follow up on 2/11/2025.    Specialty: Internal Medicine  Why: Hospital follow-up appointment @ 11am  Contact information:  [address] [facility] [id number] [phone]                             Hospital Discharge Diagnoses:  multivessel CAD    Lace+ Score: 44  59-90 High Risk  29-58 Medium Risk  0-28   Low Risk.    TCM Follow-Up Recommendation:  LACE 29-58: Moderate Risk of readmission after discharge from the hospital.        I spent >30 minutes on this discharge. Discussed treatment and discharge plans.       Hieu Romero MD

## 2025-01-12 NOTE — PLAN OF CARE
Vitals WNL throughout night & up to the bathroom without difficulty. Freq BM starting on PMs with total of 6-8 overnight. Able to fall asleep this morning approx 0200. Otherwise no c/o.    Problem: CARDIOVASCULAR - ADULT  Goal: Maintains optimal cardiac output and hemodynamic stability  Description: INTERVENTIONS:  - Monitor vital signs, rhythm, and trends  - Monitor for bleeding, hypotension and signs of decreased cardiac output  - Evaluate effectiveness of vasoactive medications to optimize hemodynamic stability  - Monitor arterial and/or venous puncture sites for bleeding and/or hematoma  - Assess quality of pulses, skin color and temperature  - Assess for signs of decreased coronary artery perfusion - ex. Angina  - Evaluate fluid balance, assess for edema, trend weights  Outcome: Progressing  Goal: Absence of cardiac arrhythmias or at baseline  Description: INTERVENTIONS:  - Continuous cardiac monitoring, monitor vital signs, obtain 12 lead EKG if indicated  - Evaluate effectiveness of antiarrhythmic and heart rate control medications as ordered  - Initiate emergency measures for life threatening arrhythmias  - Monitor electrolytes and administer replacement therapy as ordered  Outcome: Progressing     Problem: RESPIRATORY - ADULT  Goal: Achieves optimal ventilation and oxygenation  Description: INTERVENTIONS:  - Assess for changes in respiratory status  - Assess for changes in mentation and behavior  - Position to facilitate oxygenation and minimize respiratory effort  - Oxygen supplementation based on oxygen saturation or ABGs  - Provide Smoking Cessation handout, if applicable  - Encourage broncho-pulmonary hygiene including cough, deep breathe, Incentive Spirometry  - Assess the need for suctioning and perform as needed  - Assess and instruct to report SOB or any respiratory difficulty  - Respiratory Therapy support as indicated  - Manage/alleviate anxiety  - Monitor for signs/symptoms of CO2  retention  Outcome: Progressing     Problem: GASTROINTESTINAL - ADULT  Goal: Minimal or absence of nausea and vomiting  Description: INTERVENTIONS:  - Maintain adequate hydration with IV or PO as ordered and tolerated  - Nasogastric tube to low intermittent suction as ordered  - Evaluate effectiveness of ordered antiemetic medications  - Provide nonpharmacologic comfort measures as appropriate  - Advance diet as tolerated, if ordered  - Obtain nutritional consult as needed  - Evaluate fluid balance  Outcome: Progressing  Goal: Maintains or returns to baseline bowel function  Description: INTERVENTIONS:  - Assess bowel function  - Maintain adequate hydration with IV or PO as ordered and tolerated  - Evaluate effectiveness of GI medications  - Encourage mobilization and activity  - Obtain nutritional consult as needed  - Establish a toileting routine/schedule  - Consider collaborating with pharmacy to review patient's medication profile  Outcome: Progressing     Problem: GENITOURINARY - ADULT  Goal: Absence of urinary retention  Description: INTERVENTIONS:  - Assess patient’s ability to void and empty bladder  - Monitor intake/output and perform bladder scan as needed  - Follow urinary retention protocol/standard of care  - Consider collaborating with pharmacy to review patient's medication profile  - Implement strategies to promote bladder emptying  Outcome: Progressing     Problem: METABOLIC/FLUID AND ELECTROLYTES - ADULT  Goal: Glucose maintained within prescribed range  Description: INTERVENTIONS:  - Monitor Blood Glucose as ordered  - Assess for signs and symptoms of hyperglycemia and hypoglycemia  - Administer ordered medications to maintain glucose within target range  - Assess barriers to adequate nutritional intake and initiate nutrition consult as needed  - Instruct patient on self management of diabetes  Outcome: Progressing  Goal: Electrolytes maintained within normal limits  Description: INTERVENTIONS:  -  Monitor labs and rhythm and assess patient for signs and symptoms of electrolyte imbalances  - Administer electrolyte replacement as ordered  - Monitor response to electrolyte replacements, including rhythm and repeat lab results as appropriate  - Fluid restriction as ordered  - Instruct patient on fluid and nutrition restrictions as appropriate  Outcome: Progressing     Problem: HEMATOLOGIC - ADULT  Goal: Maintains hematologic stability  Description: INTERVENTIONS  - Assess for signs and symptoms of bleeding or hemorrhage  - Monitor labs and vital signs for trends  - Administer supportive blood products/factors, fluids and medications as ordered and appropriate  - Administer supportive blood products/factors as ordered and appropriate  Outcome: Progressing  Goal: Free from bleeding injury  Description: (Example usage: patient with low platelets)  INTERVENTIONS:  - Avoid intramuscular injections, enemas and rectal medication administration  - Ensure safe mobilization of patient  - Hold pressure on venipuncture sites to achieve adequate hemostasis  - Assess for signs and symptoms of internal bleeding  - Monitor lab trends  - Patient is to report abnormal signs of bleeding to staff  - Avoid use of toothpicks and dental floss  - Use electric shaver for shaving  - Use soft bristle tooth brush  - Limit straining and forceful nose blowing  Outcome: Progressing     Problem: Diabetes/Glucose Control  Goal: Glucose maintained within prescribed range  Description: INTERVENTIONS:  - Monitor Blood Glucose as ordered  - Assess for signs and symptoms of hyperglycemia and hypoglycemia  - Administer ordered medications to maintain glucose within target range  - Assess barriers to adequate nutritional intake and initiate nutrition consult as needed  - Instruct patient on self management of diabetes  Outcome: Progressing

## 2025-01-12 NOTE — PLAN OF CARE
Problem: Patient Centered Care  Goal: Patient preferences are identified and integrated in the patient's plan of care  Description: Interventions:  - What would you like us to know as we care for you? I like to travel  - Provide timely, complete, and accurate information to patient/family  - Incorporate patient and family knowledge, values, beliefs, and cultural backgrounds into the planning and delivery of care  - Encourage patient/family to participate in care and decision-making at the level they choose  - Honor patient and family perspectives and choices  Outcome: Completed     Problem: Patient/Family Goals  Goal: Patient/Family Long Term Goal  Description: Patient's Long Term Goal: Go home upon discharge    Interventions:  - Monitor labs  - Administer medications  - Pain management  - Patient centered care  - Keep patient and family informed on plan of care  - See additional Care Plan goals for specific interventions  Outcome: Completed  Goal: Patient/Family Short Term Goal  Description: Patient's Short Term Goal: manage pain    Interventions:   - PRN meds  - repositioning  - education  - See additional Care Plan goals for specific interventions  Outcome: Completed     Problem: CARDIOVASCULAR - ADULT  Goal: Maintains optimal cardiac output and hemodynamic stability  Description: INTERVENTIONS:  - Monitor vital signs, rhythm, and trends  - Monitor for bleeding, hypotension and signs of decreased cardiac output  - Evaluate effectiveness of vasoactive medications to optimize hemodynamic stability  - Monitor arterial and/or venous puncture sites for bleeding and/or hematoma  - Assess quality of pulses, skin color and temperature  - Assess for signs of decreased coronary artery perfusion - ex. Angina  - Evaluate fluid balance, assess for edema, trend weights  Outcome: Completed  Goal: Absence of cardiac arrhythmias or at baseline  Description: INTERVENTIONS:  - Continuous cardiac monitoring, monitor vital signs,  obtain 12 lead EKG if indicated  - Evaluate effectiveness of antiarrhythmic and heart rate control medications as ordered  - Initiate emergency measures for life threatening arrhythmias  - Monitor electrolytes and administer replacement therapy as ordered  Outcome: Completed     Problem: RESPIRATORY - ADULT  Goal: Achieves optimal ventilation and oxygenation  Description: INTERVENTIONS:  - Assess for changes in respiratory status  - Assess for changes in mentation and behavior  - Position to facilitate oxygenation and minimize respiratory effort  - Oxygen supplementation based on oxygen saturation or ABGs  - Provide Smoking Cessation handout, if applicable  - Encourage broncho-pulmonary hygiene including cough, deep breathe, Incentive Spirometry  - Assess the need for suctioning and perform as needed  - Assess and instruct to report SOB or any respiratory difficulty  - Respiratory Therapy support as indicated  - Manage/alleviate anxiety  - Monitor for signs/symptoms of CO2 retention  Outcome: Completed     Problem: GASTROINTESTINAL - ADULT  Goal: Minimal or absence of nausea and vomiting  Description: INTERVENTIONS:  - Maintain adequate hydration with IV or PO as ordered and tolerated  - Nasogastric tube to low intermittent suction as ordered  - Evaluate effectiveness of ordered antiemetic medications  - Provide nonpharmacologic comfort measures as appropriate  - Advance diet as tolerated, if ordered  - Obtain nutritional consult as needed  - Evaluate fluid balance  Outcome: Completed  Goal: Maintains or returns to baseline bowel function  Description: INTERVENTIONS:  - Assess bowel function  - Maintain adequate hydration with IV or PO as ordered and tolerated  - Evaluate effectiveness of GI medications  - Encourage mobilization and activity  - Obtain nutritional consult as needed  - Establish a toileting routine/schedule  - Consider collaborating with pharmacy to review patient's medication profile  Outcome:  Completed  Goal: Maintains adequate nutritional intake (undernourished)  Description: INTERVENTIONS:  - Monitor percentage of each meal consumed  - Identify factors contributing to decreased intake, treat as appropriate  - Assist with meals as needed  - Monitor I&O, WT and lab values  - Obtain nutritional consult as needed  - Optimize oral hygiene and moisture  - Encourage food from home; allow for food preferences  - Enhance eating environment  Outcome: Completed  Goal: Achieves appropriate nutritional intake (bariatric)  Description: INTERVENTIONS:  - Monitor for over-consumption  - Identify factors contributing to increased intake, treat as appropriate  - Monitor I&O, WT and lab values  - Obtain nutritional consult as needed  - Evaluate psychosocial factors contributing to over-consumption  Outcome: Completed     Problem: GENITOURINARY - ADULT  Goal: Absence of urinary retention  Description: INTERVENTIONS:  - Assess patient’s ability to void and empty bladder  - Monitor intake/output and perform bladder scan as needed  - Follow urinary retention protocol/standard of care  - Consider collaborating with pharmacy to review patient's medication profile  - Implement strategies to promote bladder emptying  Outcome: Completed     Problem: METABOLIC/FLUID AND ELECTROLYTES - ADULT  Goal: Glucose maintained within prescribed range  Description: INTERVENTIONS:  - Monitor Blood Glucose as ordered  - Assess for signs and symptoms of hyperglycemia and hypoglycemia  - Administer ordered medications to maintain glucose within target range  - Assess barriers to adequate nutritional intake and initiate nutrition consult as needed  - Instruct patient on self management of diabetes  Outcome: Completed  Goal: Electrolytes maintained within normal limits  Description: INTERVENTIONS:  - Monitor labs and rhythm and assess patient for signs and symptoms of electrolyte imbalances  - Administer electrolyte replacement as ordered  -  Monitor response to electrolyte replacements, including rhythm and repeat lab results as appropriate  - Fluid restriction as ordered  - Instruct patient on fluid and nutrition restrictions as appropriate  Outcome: Completed     Problem: HEMATOLOGIC - ADULT  Goal: Maintains hematologic stability  Description: INTERVENTIONS  - Assess for signs and symptoms of bleeding or hemorrhage  - Monitor labs and vital signs for trends  - Administer supportive blood products/factors, fluids and medications as ordered and appropriate  - Administer supportive blood products/factors as ordered and appropriate  Outcome: Completed  Goal: Free from bleeding injury  Description: (Example usage: patient with low platelets)  INTERVENTIONS:  - Avoid intramuscular injections, enemas and rectal medication administration  - Ensure safe mobilization of patient  - Hold pressure on venipuncture sites to achieve adequate hemostasis  - Assess for signs and symptoms of internal bleeding  - Monitor lab trends  - Patient is to report abnormal signs of bleeding to staff  - Avoid use of toothpicks and dental floss  - Use electric shaver for shaving  - Use soft bristle tooth brush  - Limit straining and forceful nose blowing  Outcome: Completed     Problem: Diabetes/Glucose Control  Goal: Glucose maintained within prescribed range  Description: INTERVENTIONS:  - Monitor Blood Glucose as ordered  - Assess for signs and symptoms of hyperglycemia and hypoglycemia  - Administer ordered medications to maintain glucose within target range  - Assess barriers to adequate nutritional intake and initiate nutrition consult as needed  - Instruct patient on self management of diabetes  Outcome: Completed

## 2025-01-12 NOTE — CM/SW NOTE
01/12/25 1200   Discharge disposition   Expected discharge disposition Home-Health   Post Acute Care Provider   ([facility])   Discharge transportation Private car     Per chart, pt has DC order for today.    Notified [facility] via [facility].    Pt is cleared from SW/CM stand point.    PLAN: Home w/ [facility]          ALMAZ Winn, LSW [id number]

## 2025-01-16 ENCOUNTER — TELEPHONE (OUTPATIENT)
Dept: MEDSURG UNIT | Facility: HOSPITAL | Age: 60
End: 2025-01-16

## 2025-01-17 ENCOUNTER — TELEPHONE (OUTPATIENT)
Dept: MEDSURG UNIT | Facility: HOSPITAL | Age: 60
End: 2025-01-17

## 2025-01-20 ENCOUNTER — TELEPHONE (OUTPATIENT)
Dept: MEDSURG UNIT | Facility: HOSPITAL | Age: 60
End: 2025-01-20

## 2025-01-20 ENCOUNTER — ORDER TRANSCRIPTION (OUTPATIENT)
Dept: CARDIAC REHAB | Facility: HOSPITAL | Age: 60
End: 2025-01-20

## 2025-01-20 DIAGNOSIS — I21.9 MYOCARDIAL INFARCTION (HCC): ICD-10-CM

## 2025-01-20 DIAGNOSIS — Z95.1 S/P CABG (CORONARY ARTERY BYPASS GRAFT): Primary | ICD-10-CM

## 2025-01-29 ENCOUNTER — PATIENT MESSAGE (OUTPATIENT)
Dept: INTERNAL MEDICINE CLINIC | Facility: CLINIC | Age: 60
End: 2025-01-29

## 2025-01-29 DIAGNOSIS — E66.9 OBESITY (BMI 30-39.9): Primary | ICD-10-CM

## 2025-01-30 ENCOUNTER — CARDPULM VISIT (OUTPATIENT)
Dept: CARDIAC REHAB | Facility: HOSPITAL | Age: 60
End: 2025-01-30
Attending: INTERNAL MEDICINE
Payer: COMMERCIAL

## 2025-02-03 ENCOUNTER — CARDPULM VISIT (OUTPATIENT)
Dept: CARDIAC REHAB | Facility: HOSPITAL | Age: 60
End: 2025-02-03
Attending: INTERNAL MEDICINE
Payer: COMMERCIAL

## 2025-02-03 PROCEDURE — 93798 PHYS/QHP OP CAR RHAB W/ECG: CPT

## 2025-02-03 NOTE — TELEPHONE ENCOUNTER
Patient was taking Zepbound 5mg, paying out of pocket before.     Patient wants to know if there is a less expensive prescription.     Here is the last prescription below:   Disp Refills Start End       Tirzepatide-Weight Management (ZEPBOUND) 5 MG/0.5ML Subcutaneous Solution (Discontinued) 6 mL 0 11/6/2024 1/12/2025     Sig - Route: Inject 5 mg into the skin once a week. - Subcutaneous      Please advise?

## 2025-02-04 ENCOUNTER — TELEPHONE (OUTPATIENT)
Dept: INTERNAL MEDICINE CLINIC | Facility: CLINIC | Age: 60
End: 2025-02-04

## 2025-02-04 DIAGNOSIS — I25.10 CORONARY ARTERY DISEASE INVOLVING NATIVE CORONARY ARTERY WITHOUT ANGINA PECTORIS, UNSPECIFIED WHETHER NATIVE OR TRANSPLANTED HEART: Primary | ICD-10-CM

## 2025-02-04 DIAGNOSIS — I25.10 ATHEROSCLEROSIS OF NATIVE CORONARY ARTERY WITHOUT ANGINA PECTORIS, UNSPECIFIED WHETHER NATIVE OR TRANSPLANTED HEART: ICD-10-CM

## 2025-02-04 NOTE — TELEPHONE ENCOUNTER
Referral for Dr. Evan Mallory entered. Message routed to manage care to expedite referral. Patient has appointment today.

## 2025-02-04 NOTE — TELEPHONE ENCOUNTER
Patient called the office.  She is seeing Dr. Evan Mallory today for a follow up office visit after her bypass surgery.    She also said her last appointment on 1/20 in his office, they need a referral for that too.

## 2025-02-05 ENCOUNTER — CARDPULM VISIT (OUTPATIENT)
Dept: CARDIAC REHAB | Facility: HOSPITAL | Age: 60
End: 2025-02-05
Attending: INTERNAL MEDICINE
Payer: COMMERCIAL

## 2025-02-05 PROCEDURE — 93798 PHYS/QHP OP CAR RHAB W/ECG: CPT

## 2025-02-06 ENCOUNTER — CARDPULM VISIT (OUTPATIENT)
Dept: CARDIAC REHAB | Facility: HOSPITAL | Age: 60
End: 2025-02-06
Attending: INTERNAL MEDICINE
Payer: COMMERCIAL

## 2025-02-06 PROCEDURE — 93798 PHYS/QHP OP CAR RHAB W/ECG: CPT

## 2025-02-06 RX ORDER — TIRZEPATIDE 5 MG/.5ML
5 INJECTION, SOLUTION SUBCUTANEOUS WEEKLY
Qty: 2 ML | Refills: 0 | Status: SHIPPED | OUTPATIENT
Start: 2025-02-06 | End: 2025-02-28

## 2025-02-06 RX ORDER — TIRZEPATIDE 2.5 MG/.5ML
2.5 INJECTION, SOLUTION SUBCUTANEOUS WEEKLY
Qty: 2 ML | Refills: 0 | Status: SHIPPED | OUTPATIENT
Start: 2025-02-06 | End: 2025-02-28

## 2025-02-10 ENCOUNTER — CARDPULM VISIT (OUTPATIENT)
Dept: CARDIAC REHAB | Facility: HOSPITAL | Age: 60
End: 2025-02-10
Attending: INTERNAL MEDICINE
Payer: COMMERCIAL

## 2025-02-10 PROCEDURE — 93798 PHYS/QHP OP CAR RHAB W/ECG: CPT

## 2025-02-10 RX ORDER — TIRZEPATIDE 5 MG/.5ML
5 INJECTION, SOLUTION SUBCUTANEOUS WEEKLY
Qty: 6 ML | Refills: 0 | Status: SHIPPED | OUTPATIENT
Start: 2025-02-10

## 2025-02-11 ENCOUNTER — OFFICE VISIT (OUTPATIENT)
Dept: PODIATRY CLINIC | Facility: CLINIC | Age: 60
End: 2025-02-11
Payer: COMMERCIAL

## 2025-02-11 ENCOUNTER — OFFICE VISIT (OUTPATIENT)
Dept: INTERNAL MEDICINE CLINIC | Facility: CLINIC | Age: 60
End: 2025-02-11
Payer: COMMERCIAL

## 2025-02-11 VITALS
SYSTOLIC BLOOD PRESSURE: 128 MMHG | HEART RATE: 66 BPM | BODY MASS INDEX: 33.97 KG/M2 | HEIGHT: 69 IN | DIASTOLIC BLOOD PRESSURE: 70 MMHG | WEIGHT: 229.38 LBS | OXYGEN SATURATION: 96 %

## 2025-02-11 DIAGNOSIS — I25.810 CORONARY ARTERY DISEASE INVOLVING CORONARY BYPASS GRAFT OF NATIVE HEART, UNSPECIFIED WHETHER ANGINA PRESENT: ICD-10-CM

## 2025-02-11 DIAGNOSIS — D37.3 LOW GRADE MUCINOUS NEOPLASM OF APPENDIX: ICD-10-CM

## 2025-02-11 DIAGNOSIS — D69.6 THROMBOCYTOPENIA: ICD-10-CM

## 2025-02-11 DIAGNOSIS — L60.0 INGROWN TOENAIL: Primary | ICD-10-CM

## 2025-02-11 DIAGNOSIS — L60.3 NAIL DYSTROPHY: ICD-10-CM

## 2025-02-11 DIAGNOSIS — Z95.1 S/P CABG (CORONARY ARTERY BYPASS GRAFT): ICD-10-CM

## 2025-02-11 DIAGNOSIS — M79.89 LEG SWELLING: ICD-10-CM

## 2025-02-11 DIAGNOSIS — D64.9 ANEMIA, UNSPECIFIED TYPE: ICD-10-CM

## 2025-02-11 DIAGNOSIS — E04.1 THYROID NODULE: ICD-10-CM

## 2025-02-11 DIAGNOSIS — Z09 HOSPITAL DISCHARGE FOLLOW-UP: Primary | ICD-10-CM

## 2025-02-11 PROBLEM — J47.0 BRONCHIECTASIS WITH ACUTE LOWER RESPIRATORY INFECTION (HCC): Status: RESOLVED | Noted: 2019-07-10 | Resolved: 2025-02-11

## 2025-02-11 PROCEDURE — 99203 OFFICE O/P NEW LOW 30 MIN: CPT | Performed by: STUDENT IN AN ORGANIZED HEALTH CARE EDUCATION/TRAINING PROGRAM

## 2025-02-11 PROCEDURE — 3008F BODY MASS INDEX DOCD: CPT | Performed by: INTERNAL MEDICINE

## 2025-02-11 PROCEDURE — 3078F DIAST BP <80 MM HG: CPT | Performed by: INTERNAL MEDICINE

## 2025-02-11 PROCEDURE — 99215 OFFICE O/P EST HI 40 MIN: CPT | Performed by: INTERNAL MEDICINE

## 2025-02-11 PROCEDURE — 3074F SYST BP LT 130 MM HG: CPT | Performed by: INTERNAL MEDICINE

## 2025-02-11 RX ORDER — POTASSIUM CHLORIDE 750 MG/1
10 TABLET, EXTENDED RELEASE ORAL DAILY
Qty: 30 TABLET | Refills: 1 | Status: SHIPPED | OUTPATIENT
Start: 2025-02-11

## 2025-02-11 RX ORDER — FUROSEMIDE 20 MG/1
20 TABLET ORAL DAILY PRN
Qty: 30 TABLET | Refills: 0 | Status: SHIPPED | OUTPATIENT
Start: 2025-02-11 | End: 2025-03-13

## 2025-02-11 NOTE — PROGRESS NOTES
[facility]  Progress Note      Olimpia Medina is a 60 year old female.   Chief Complaint   Patient presents with    Consult     Bilateral feet- right hallux 2nd digit- left 2nd digit- patient states she had procedures on bilateral hallux right hallux ingrown came back- rates pain 4/10 bilateral 2nd digit- painful when wearing socks and when bed sheets rubbed -          HPI:     Patient is a pleasant 60-year-old female who presents to clinic for bilateral second digit toenail pain as well as right hallux toenail pain.  Patient relates that she is going through cardiopulmonary rehab at this time where she has to be walking.  Patient would like to discuss treatment options    Allergies: Pcns [penicillins] and Latex    Current Outpatient Medications   Medication Sig Dispense Refill    Tirzepatide-Weight Management (ZEPBOUND) 5 MG/0.5ML Subcutaneous Solution Inject 5 mg into the skin once a week. 6 mL 0    Tirzepatide-Weight Management (ZEPBOUND) 2.5 MG/0.5ML Subcutaneous Solution Auto-injector Inject 2.5 mg into the skin once a week for 4 doses. 2 mL 0    Tirzepatide-Weight Management (ZEPBOUND) 5 MG/0.5ML Subcutaneous Solution Auto-injector Inject 5 mg into the skin once a week for 4 doses. 2 mL 0    metoprolol tartrate 25 MG Oral Tab Take 1 tablet (25 mg total) by mouth 2x Daily(Beta Blocker). 60 tablet 0    ascorbic acid 500 MG Oral Tab Take 1 tablet (500 mg total) by mouth 2 (two) times daily. 60 tablet 0    clopidogrel (PLAVIX) 75 MG Oral Tab Take 1 tablet (75 mg total) by mouth daily. 90 tablet 0    multivitamin Oral Tab Take 1 tablet by mouth daily. 90 tablet 0    ergocalciferol 1.25 MG (22976 UT) Oral Cap Take 1 capsule (50,000 Units total) by mouth once a week. 12 capsule 0    aspirin 81 MG Oral Tab EC Take 1 tablet (81 mg total) by mouth daily.      levothyroxine 25 MCG Oral Tab Take 1 tablet (25 mcg total) by mouth before breakfast.      furosemide 20 MG Oral Tab Take 1 tablet (20 mg total)  by mouth daily as needed. (Patient not taking: Reported on 2/11/2025) 30 tablet 0    Potassium Chloride ER 10 MEQ Oral Tab CR Take 1 tablet (10 mEq total) by mouth daily. (Patient not taking: Reported on 2/11/2025) 30 tablet 1      Past Medical History:    Arrhythmia    Hx of irregular heartbeat, cardiac workup completed 2016, negative per cardiologist Dr. Joey Orantes, no treatment    Back problem    Neck pain, into left hand with numbness and tingling    Coronary atherosclerosis    DEPRESSION    Disorder of thyroid    HERPES SIMPLEX    shingles on right upper arm, genital, not oral    High blood pressure    High cholesterol    Hx of motion sickness    HYPERLIPIDEMIA    Low grade mucinous neoplasm of appendix    Migraines    Blurred Vision r/t headache    Migraines    NEUROCARDIOGENIC DISEASE    Other and unspecified hyperlipidemia    Thyroid disease      Past Surgical History:   Procedure Laterality Date    Appendectomy  07/10/2018    Laparoscopic Cecectomy, Drainage of Appendiceal abcess    Appendectomy      Colonoscopy N/A 08/13/2018    Procedure: COLONOSCOPY, POSSIBLE BIOPSY, POSSIBLE POLYPECTOMY 11311;  Surgeon: Guillermo Meyer MD;  Location: [facility]    Colonoscopy N/A 06/28/2021    Procedure: COLONOSCOPY;  Surgeon: Guillermo Meyer MD;  Location: [facility]    Lasik enhancement - od - right eye Bilateral     Other surgical history  01/10/2011    flex cysto, dr alvarez    Other surgical history  11/2011    Rt knee torn meniscus repair    Other surgical history Left 03/13/2015    Procedure: KNEE ARTHROSCOPY;  Surgeon: Eduardo Hidalgo MD;  Location:  MAIN OR    Other surgical history  08/23/2018    robotic right hemicolectomy    Other surgical history      lef t meniscus repair knee      Family History   Problem Relation Age of Onset    Hypertension Mother     Lipids Mother     Heart Disorder Mother         heavy smoker    Other (PVD) Mother     Diabetes Maternal  Grandmother     Hypertension Maternal Grandmother     Lipids Maternal Grandmother     Obesity Maternal Grandmother     Heart Disorder Maternal Grandmother         MI  60's    Cancer Paternal Grandmother         colon cancer 50's, 92    Cancer Paternal Grandfather         bladder ca 60's    Cancer Father         Bladder cancer,  late 60'd    Hypertension Brother     Ovarian Cancer Paternal Cousin Female         under 50      Social History     Socioeconomic History    Marital status:     Number of children: 2   Tobacco Use    Smoking status: Never    Smokeless tobacco: Never   Vaping Use    Vaping status: Never Used   Substance and Sexual Activity    Alcohol use: Yes     Comment: occ    Drug use: No    Sexual activity: Yes     Partners: Male     Birth control/protection: Vasectomy   Other Topics Concern    Caffeine Concern No    Seat Belt Yes    History of tanning Yes    Reaction to local anesthetic No    Pt has a pacemaker No    Pt has a defibrillator No           REVIEW OF SYSTEMS:     Denies nause, fever, chills  No calf pain  Denies chest pain or SOB      EXAM:   LMP 01/15/2016 (Approximate)   GENERAL: well developed, well nourished, in no apparent distress  EXTREMITIES:   1. Integument: Normal skin temperature and turgor. Lionel 2nd digit toenails are thickened and incurvated. Incurvation of right hallux toenail.   2. Vascular: Dorsalis pedis two out of four bilateral and posterior tibial pulses two out of   four bilateral, capillary refill normal.   3. Musculoskeletal: pain with palpation to lionel 2nd digit toenails and right hallux toenail.    4. Neurological: Normal sharp dull sensation; reflexes normal.             ASSESSMENT AND PLAN:   Diagnoses and all orders for this visit:    Ingrown toenail    Nail dystrophy        Plan:     Patient seen and examined and findings discussed with patient.  Discussed etiology of condition along with treatment options.  Discussed partial nail avulsion versus  total nail avulsion with chemical matricectomy.  Since patient is currently going through rehab I recommend that she completes her rehab and we do a temporary slant back of ingrown toenails.  Patient related understanding and wishes to proceed as discussed.  Using sterile nail nippers a slant back was performed to bilateral medial lateral nail borders of second digits as well as bilateral borders of right hallux.  Patient tolerated procedure well.  Instructed patient to book a 20-minute procedure to have nail avulsions with chemical matricectomy's of bilateral second digits.    The patient indicates understanding of these issues and agrees to the plan.        Leslie Cotto DPM

## 2025-02-11 NOTE — PROGRESS NOTES
Olimpia Medina is a 60 year old female.    Chief complaint: hospital discharge follow up     HPI:     Olimpia Medina is a 60 year old pleasant female who presents for hospital discharge follow up   Per discharge summary;    Multivessel CAD s/p CABG   HTN  HL  Hypothyroidism  [place]      History of Present Illness:      Copied from Admission H&P:     Olimpia Medina is a a(n) 59 year old female.  History of hypertension hyperlipidemia and nonalcoholic steatohepatitis.  Denies any history of chest pain, chest pressure, back pain, nausea or vomiting.  Underwent a screening calcium coronary scan for positive family history of coronary artery disease and found to have an elevated score.  This prompted referral to cardiology where stress test was ordered and was abnormal.  This prompted angiography demonstrating severe multivessel coronary artery disease including a 60-70% lesion left main and ostial circumflex lesion greater than 70%.  We are asked to evaluate for surgical revascularization options.  Patient denies symptoms as above.  She does report 4-5 episodes approximately 4 to 5 years ago where her heart rate will reach 180 and sustained there for approximately 15 to 30 minutes.  This was associated with either exercise, 1 episode where she was having a alcoholic drink and another episode when she knew she was dehydrated.  Has not been diagnosed with atrial fibrillation or arrhythmia otherwise.  Has seen cardiology at the time.         Hospital Course:      Multivessel CAD s/p CABG  -cardiology, CT surgery, critical care following  -cont asa, plavix, metoprolol, statin  -doing very well post-op     HTN  -cont meds and monitor     HL  -cont statin     Hypothyroidism  -cont levothyroxine             Today   She statered cardiac rehab   No chest   No SOB   On cardiac meds   Reports having   Left leg swelling   No leg pain   No calf tenderness   Mainly of the left leg where she had surgery           When she  had US carotid   Was found to have thyroid nodule            Obesity   On zepbound         Current Outpatient Medications   Medication Sig Dispense Refill    metoprolol tartrate 25 MG Oral Tab Take 1 tablet (25 mg total) by mouth 2x Daily(Beta Blocker). 60 tablet 0    ascorbic acid 500 MG Oral Tab Take 1 tablet (500 mg total) by mouth 2 (two) times daily. 60 tablet 0    clopidogrel (PLAVIX) 75 MG Oral Tab Take 1 tablet (75 mg total) by mouth daily. 90 tablet 0    multivitamin Oral Tab Take 1 tablet by mouth daily. 90 tablet 0    ergocalciferol 1.25 MG (12316 UT) Oral Cap Take 1 capsule (50,000 Units total) by mouth once a week. 12 capsule 0    aspirin 81 MG Oral Tab EC Take 1 tablet (81 mg total) by mouth daily.      levothyroxine 25 MCG Oral Tab Take 1 tablet (25 mcg total) by mouth before breakfast.      Tirzepatide-Weight Management (ZEPBOUND) 5 MG/0.5ML Subcutaneous Solution Inject 5 mg into the skin once a week. (Patient not taking: Reported on 2/11/2025) 6 mL 0    Tirzepatide-Weight Management (ZEPBOUND) 2.5 MG/0.5ML Subcutaneous Solution Auto-injector Inject 2.5 mg into the skin once a week for 4 doses. (Patient not taking: Reported on 2/11/2025) 2 mL 0    Tirzepatide-Weight Management (ZEPBOUND) 5 MG/0.5ML Subcutaneous Solution Auto-injector Inject 5 mg into the skin once a week for 4 doses. (Patient not taking: Reported on 2/11/2025) 2 mL 0      Past Medical History:    Arrhythmia    Hx of irregular heartbeat, cardiac workup completed 2016, negative per cardiologist Dr. Joey Orantes, no treatment    Back problem    Neck pain, into left hand with numbness and tingling    Coronary atherosclerosis    DEPRESSION    Disorder of thyroid    HERPES SIMPLEX    shingles on right upper arm, genital, not oral    High blood pressure    High cholesterol    Hx of motion sickness    HYPERLIPIDEMIA    Low grade mucinous neoplasm of appendix    Migraines    Blurred Vision r/t headache    Migraines    NEUROCARDIOGENIC DISEASE     Other and unspecified hyperlipidemia    Thyroid disease     Past Surgical History:   Procedure Laterality Date    Appendectomy  07/10/2018    Laparoscopic Cecectomy, Drainage of Appendiceal abcess    Appendectomy      Colonoscopy N/A 2018    Procedure: COLONOSCOPY, POSSIBLE BIOPSY, POSSIBLE POLYPECTOMY 41581;  Surgeon: Guillermo Meyer MD;  Location: [facility]    Colonoscopy N/A 2021    Procedure: COLONOSCOPY;  Surgeon: Guillermo Meyer MD;  Location: [facility]    Lasik enhancement - od - right eye Bilateral     Other surgical history  01/10/2011    flex cysto, dr alvarez    Other surgical history  2011    Rt knee torn meniscus repair    Other surgical history Left 2015    Procedure: KNEE ARTHROSCOPY;  Surgeon: Eduardo Hidalgo MD;  Location:  [facility] OR    Other surgical history  2018    robotic right hemicolectomy    Other surgical history      lef t meniscus repair knee             Family History   Problem Relation Age of Onset    Hypertension Mother     Lipids Mother     Heart Disorder Mother         heavy smoker    Other (PVD) Mother     Diabetes Maternal Grandmother     Hypertension Maternal Grandmother     Lipids Maternal Grandmother     Obesity Maternal Grandmother     Heart Disorder Maternal Grandmother         MI  60's    Cancer Paternal Grandmother         colon cancer 50's, 92    Cancer Paternal Grandfather         bladder ca 60's    Cancer Father         Bladder cancer,  late 60'd    Hypertension Brother     Ovarian Cancer Paternal Cousin Female         under 50     Patient Active Problem List   Diagnosis    Low grade mucinous neoplasm of appendix    Preoperative testing    Lipoma of left upper extremity    Bronchiectasis with acute lower respiratory infection (HCC)    Hepatic steatosis    Microscopic hematuria    Obesity (BMI 30-39.9)    Mixed hyperlipidemia    Mass of left forearm    Essential hypertension    Liver cyst     Vitamin D deficiency    Encounter for screening mammogram for malignant neoplasm of breast    Hyperlipidemia    Sensation of plugged ear on right side    Impacted cerumen of right ear    Elevated coronary artery calcium score    CAD (coronary artery disease)       REVIEW OF SYSTEMS:   A comprehensive 10 point review of systems was completed.  Pertinent positives and negatives noted in the the HPI            EXAM:   /72   Pulse 66   Ht 5' 9\" (1.753 m)   Wt 229 lb 6.4 oz (104.1 kg)   LMP 01/15/2016 (Approximate)   SpO2 96%   BMI 33.88 kg/m²   GENERAL: well developed, well nourished,in no apparent distress  Neck : prominent thyroid   LUNGS: clear to auscultation  CARDIO: RRR without murmur  CABG healing well no signs of infection       NEURO: no gross deficits       Legs : no calf tenderness  Bilateral pitting edema more on the left              No orders of the defined types were placed in this encounter.    XR CHEST PA + LAT CHEST (CPT=71046)    Result Date: 1/12/2025  CONCLUSION:  1. Post coronary artery bypass chest with stable cardiomegaly. 2. Left basilar pleural effusion with compressive atelectasis.  No pneumothorax.    Dictated by (CST): Kash Rivas MD on 1/12/2025 at 0:06 AM     Finalized by (CST): Kash Rivas MD on 1/12/2025 at 0:08 AM          XR CHEST AP PORTABLE  (CPT=71045)    Result Date: 1/9/2025  CONCLUSION:   The endotracheal tube has been removed.   Unchanged Seale-Lucy catheter, right medial chest tube, and left basilar chest tube.  Unchanged epicardial drain.  Improved pulmonary edema.  Slightly decreased left pleural effusion.  No appreciable pneumothorax.  No new abnormality.        Dictated by (CST): Sebastian Lo MD on 1/09/2025 at 9:18 AM     Finalized by (CST): Sebastian Lo MD on 1/09/2025 at 9:20 AM          XR CHEST AP PORTABLE  (CPT=71045)    Result Date: 1/8/2025  CONCLUSION:  1. ET tube is high in position with the tip about 6 cm above the norma. 2. Swans Lucy  catheter at junction between RVOT and main pulmonary artery pointing towards right. 3. Cardiomegaly with mild pulmonary venous congestion. 4. Chest tubes in place.  No pneumothorax.    Dictated by (CST): Kash Rivas MD on 1/08/2025 at 2:05 PM     Finalized by (CST): Kash Rivas MD on 1/08/2025 at 2:07 PM          US CAROTID DOPPLER BILAT - DIAG IMG (CPT=93880)    Result Date: 1/6/2025  CONCLUSION:  1. Bilateral carotid bifurcation/proximal ICA plaque without hemodynamic significance (<49% stenosis).  2. Antegrade flow within both vertebral arteries. 3. Dominant solid left thyroid nodule measures 3.6 x 1.4 x 3.0 cm.  Recommend thyroid ultrasound for baseline exam.  Consider FNA for cytologic evaluation.     Dictated by (CST): Ian Guevara MD on 1/06/2025 at 1:50 PM     Finalized by (CST): Ian Guevara MD on 1/06/2025 at 1:54 PM     PLEASE FAX REPORT           ASSESSMENT AND PLAN:     1. Coronary artery disease involving coronary bypass graft of native heart, unspecified whether angina present  Reviewed Op note   Continue cardiac meds   Continue cardiac rehab  She is scheduled to repeat her echo  Follow-up with a cardiologist per his plan    - Lipid Panel [E]; Future  - Comp Metabolic Panel (14) [E]; Future  - CBC With Differential With Platelet; Future  - Iron And Tibc; Future  - Ferritin; Future  - Vitamin B12; Future  - Folic Acid Serum [E]; Future  - TSH W Reflex To Free T4; Future  - Gastro Referral - In Network  - furosemide 20 MG Oral Tab; Take 1 tablet (20 mg total) by mouth daily as needed.  Dispense: 30 tablet; Refill: 0  - Potassium Chloride ER 10 MEQ Oral Tab CR; Take 1 tablet (10 mEq total) by mouth daily.  Dispense: 30 tablet; Refill: 1  - MyChart Blood Pressure Flowsheet  - US THYROID (CPT=76536); Future    2. Thyroid nodule  Advised to have an ultrasound thyroid  Discussed based on the size most likely she is going needed for FNA  - Lipid Panel [E]; Future  - Comp Metabolic Panel  (14) [E]; Future  - CBC With Differential With Platelet; Future  - Iron And Tibc; Future  - Ferritin; Future  - Vitamin B12; Future  - Folic Acid Serum [E]; Future  - TSH W Reflex To Free T4; Future  - Gastro Referral - In Network  - furosemide 20 MG Oral Tab; Take 1 tablet (20 mg total) by mouth daily as needed.  Dispense: 30 tablet; Refill: 0  - Potassium Chloride ER 10 MEQ Oral Tab CR; Take 1 tablet (10 mEq total) by mouth daily.  Dispense: 30 tablet; Refill: 1  - MyChart Blood Pressure Flowsheet  - US THYROID (CPT=76536); Future    3. Low grade mucinous neoplasm of appendix  Follow-up with the surgeon  - Lipid Panel [E]; Future  - Comp Metabolic Panel (14) [E]; Future  - CBC With Differential With Platelet; Future  - Iron And Tibc; Future  - Ferritin; Future  - Vitamin B12; Future  - Folic Acid Serum [E]; Future  - TSH W Reflex To Free T4; Future  - Gastro Referral - In Network  - furosemide 20 MG Oral Tab; Take 1 tablet (20 mg total) by mouth daily as needed.  Dispense: 30 tablet; Refill: 0  - Potassium Chloride ER 10 MEQ Oral Tab CR; Take 1 tablet (10 mEq total) by mouth daily.  Dispense: 30 tablet; Refill: 1  - MyChart Blood Pressure Flowsheet  - US THYROID (CPT=76536); Future    4. S/P CABG (coronary artery bypass graft)  Continue cardiac medications  Reviewed operative note, labs, and imaging    - Lipid Panel [E]; Future  - Comp Metabolic Panel (14) [E]; Future  - CBC With Differential With Platelet; Future  - Iron And Tibc; Future  - Ferritin; Future  - Vitamin B12; Future  - Folic Acid Serum [E]; Future  - TSH W Reflex To Free T4; Future  - Gastro Referral - In Network  - furosemide 20 MG Oral Tab; Take 1 tablet (20 mg total) by mouth daily as needed.  Dispense: 30 tablet; Refill: 0  - Potassium Chloride ER 10 MEQ Oral Tab CR; Take 1 tablet (10 mEq total) by mouth daily.  Dispense: 30 tablet; Refill: 1  - MyChart Blood Pressure Flowsheet  - US THYROID (CPT=76536); Future    5. Leg swelling  Continue  cardiac medications  Can use Lasix as needed for leg swelling    - Lipid Panel [E]; Future  - Comp Metabolic Panel (14) [E]; Future  - CBC With Differential With Platelet; Future  - Iron And Tibc; Future  - Ferritin; Future  - Vitamin B12; Future  - Folic Acid Serum [E]; Future  - TSH W Reflex To Free T4; Future  - Gastro Referral - In Network  - furosemide 20 MG Oral Tab; Take 1 tablet (20 mg total) by mouth daily as needed.  Dispense: 30 tablet; Refill: 0  - Potassium Chloride ER 10 MEQ Oral Tab CR; Take 1 tablet (10 mEq total) by mouth daily.  Dispense: 30 tablet; Refill: 1  - MyChart Blood Pressure Flowsheet  - US THYROID (CPT=76536); Future    6. Anemia, unspecified type  Repeat CBC  Will check vitamin B12, iron, folic acid  - Lipid Panel [E]; Future  - Comp Metabolic Panel (14) [E]; Future  - CBC With Differential With Platelet; Future  - Iron And Tibc; Future  - Ferritin; Future  - Vitamin B12; Future  - Folic Acid Serum [E]; Future  - TSH W Reflex To Free T4; Future  - Gastro Referral - In Network  - furosemide 20 MG Oral Tab; Take 1 tablet (20 mg total) by mouth daily as needed.  Dispense: 30 tablet; Refill: 0  - Potassium Chloride ER 10 MEQ Oral Tab CR; Take 1 tablet (10 mEq total) by mouth daily.  Dispense: 30 tablet; Refill: 1  - MyChart Blood Pressure Flowsheet  - US THYROID (CPT=76536); Future    7. Thrombocytopenia  CBC  - Lipid Panel [E]; Future  - Comp Metabolic Panel (14) [E]; Future  - CBC With Differential With Platelet; Future  - Iron And Tibc; Future  - Ferritin; Future  - Vitamin B12; Future  - Folic Acid Serum [E]; Future  - TSH W Reflex To Free T4; Future  - Gastro Referral - In Network  - furosemide 20 MG Oral Tab; Take 1 tablet (20 mg total) by mouth daily as needed.  Dispense: 30 tablet; Refill: 0  - Potassium Chloride ER 10 MEQ Oral Tab CR; Take 1 tablet (10 mEq total) by mouth daily.  Dispense: 30 tablet; Refill: 1  - MyChart Blood Pressure Flowsheet  - US THYROID (CPT=76536);  Future    8. Hospital discharge follow-up  Reviewed hospital chart, notes, labs and imaging  Discussed with the patient  - Lipid Panel [E]; Future  - Comp Metabolic Panel (14) [E]; Future  - CBC With Differential With Platelet; Future  - Iron And Tibc; Future  - Ferritin; Future  - Vitamin B12; Future  - Folic Acid Serum [E]; Future  - TSH W Reflex To Free T4; Future  - Gastro Referral - In Network  - furosemide 20 MG Oral Tab; Take 1 tablet (20 mg total) by mouth daily as needed.  Dispense: 30 tablet; Refill: 0  - Potassium Chloride ER 10 MEQ Oral Tab CR; Take 1 tablet (10 mEq total) by mouth daily.  Dispense: 30 tablet; Refill: 1  - MyChart Blood Pressure Flowsheet  - US THYROID (CPT=76536); Future       Declined prevnar vaccine and flu vaccine   Follow up in 2 months     I spent 40 minutes at the day of the service seeing the patient, examination, reviewing labs, independently interpreting results, completing charting and counseling the patient and/or on coordination of care.  The diagnosis, prognosis, and general treatment was explained to the patient.     Please return to the clinic if you are having persistent symptoms. If worsening symptoms should go to the ER    Tori Samuels MD,   Diplomate of the American Board of Internal Medicine  Diplomate of the American Board of Obesity Medicine

## 2025-02-12 ENCOUNTER — MED REC SCAN ONLY (OUTPATIENT)
Dept: INTERNAL MEDICINE CLINIC | Facility: CLINIC | Age: 60
End: 2025-02-12

## 2025-02-12 ENCOUNTER — PATIENT MESSAGE (OUTPATIENT)
Dept: PODIATRY CLINIC | Facility: CLINIC | Age: 60
End: 2025-02-12

## 2025-02-12 RX ORDER — METOPROLOL TARTRATE 25 MG/1
25 TABLET, FILM COATED ORAL 2 TIMES DAILY
Qty: 60 TABLET | Refills: 0 | OUTPATIENT
Start: 2025-02-12

## 2025-02-12 NOTE — TELEPHONE ENCOUNTER
I don;t see mention of a cream in office note, but please advise and send new prescription if needed

## 2025-02-12 NOTE — TELEPHONE ENCOUNTER
Future Appt. 03/06/2025    Last Visit: 02/11/2025    Medication Requested:  Requested Prescriptions     Pending Prescriptions Disp Refills    METOPROLOL TARTRATE 25 MG Oral Tab [Pharmacy Med Name: Metoprolol Tartrate 25 Mg Tab Auro] 60 tablet 0     Sig: TAKE ONE TABLET BY MOUTH TWICE DAILY (beta-blocker)      Last refill:        Disp Refills Start End     metoprolol tartrate 25 MG Oral Tab 60 tablet 0 1/12/2025 --    Sig - Route: Take 1 tablet (25 mg total) by mouth 2x Daily(Beta Blocker). - Oral        Hypertension Medications Protocol [id number]/12/2025 12:58 PM   Protocol Details Medication is active on med list    CMP or BMP in past 12 months    Last BP reading less than 140/90    In person appointment or virtual visit in the past 12 mos or appointment in next 3 mos    EGFRCR or GFRNAA > 50

## 2025-02-13 ENCOUNTER — TELEPHONE (OUTPATIENT)
Dept: INTERNAL MEDICINE CLINIC | Facility: CLINIC | Age: 60
End: 2025-02-13

## 2025-02-13 ENCOUNTER — CARDPULM VISIT (OUTPATIENT)
Dept: CARDIAC REHAB | Facility: HOSPITAL | Age: 60
End: 2025-02-13
Attending: INTERNAL MEDICINE
Payer: COMMERCIAL

## 2025-02-13 PROCEDURE — 93798 PHYS/QHP OP CAR RHAB W/ECG: CPT

## 2025-02-13 RX ORDER — KETOCONAZOLE 20 MG/G
1 CREAM TOPICAL DAILY
Qty: 60 G | Refills: 3 | Status: SHIPPED | OUTPATIENT
Start: 2025-02-13

## 2025-02-13 NOTE — TELEPHONE ENCOUNTER
Received fax from [facility] on Coverage Exception Form for Zepbound.     Patient is requesting to fill out this form.     Forms placed in Dr maru HELM MA

## 2025-02-14 ENCOUNTER — HOSPITAL ENCOUNTER (OUTPATIENT)
Dept: ULTRASOUND IMAGING | Age: 60
Discharge: HOME | End: 2025-02-14
Attending: INTERNAL MEDICINE
Payer: COMMERCIAL

## 2025-02-14 DIAGNOSIS — M79.89 LEG SWELLING: ICD-10-CM

## 2025-02-14 DIAGNOSIS — D64.9 ANEMIA, UNSPECIFIED TYPE: ICD-10-CM

## 2025-02-14 DIAGNOSIS — D69.6 THROMBOCYTOPENIA: ICD-10-CM

## 2025-02-14 DIAGNOSIS — I25.810 CORONARY ARTERY DISEASE INVOLVING CORONARY BYPASS GRAFT OF NATIVE HEART, UNSPECIFIED WHETHER ANGINA PRESENT: ICD-10-CM

## 2025-02-14 DIAGNOSIS — E04.1 THYROID NODULE: ICD-10-CM

## 2025-02-14 DIAGNOSIS — Z95.1 S/P CABG (CORONARY ARTERY BYPASS GRAFT): ICD-10-CM

## 2025-02-14 DIAGNOSIS — D37.3 LOW GRADE MUCINOUS NEOPLASM OF APPENDIX: ICD-10-CM

## 2025-02-14 DIAGNOSIS — Z09 HOSPITAL DISCHARGE FOLLOW-UP: ICD-10-CM

## 2025-02-14 PROCEDURE — 76536 US EXAM OF HEAD AND NECK: CPT | Performed by: INTERNAL MEDICINE

## 2025-02-17 ENCOUNTER — CARDPULM VISIT (OUTPATIENT)
Dept: CARDIAC REHAB | Facility: HOSPITAL | Age: 60
End: 2025-02-17
Attending: INTERNAL MEDICINE
Payer: COMMERCIAL

## 2025-02-17 PROCEDURE — 93798 PHYS/QHP OP CAR RHAB W/ECG: CPT

## 2025-02-18 ENCOUNTER — PATIENT MESSAGE (OUTPATIENT)
Dept: INTERNAL MEDICINE CLINIC | Facility: CLINIC | Age: 60
End: 2025-02-18

## 2025-02-18 DIAGNOSIS — I25.10 CORONARY ARTERY DISEASE DUE TO LIPID RICH PLAQUE: Primary | ICD-10-CM

## 2025-02-18 DIAGNOSIS — F43.9 STRESS: ICD-10-CM

## 2025-02-18 DIAGNOSIS — I25.83 CORONARY ARTERY DISEASE DUE TO LIPID RICH PLAQUE: Primary | ICD-10-CM

## 2025-02-19 ENCOUNTER — CARDPULM VISIT (OUTPATIENT)
Dept: CARDIAC REHAB | Facility: HOSPITAL | Age: 60
End: 2025-02-19
Attending: INTERNAL MEDICINE
Payer: COMMERCIAL

## 2025-02-19 DIAGNOSIS — E04.1 THYROID NODULE: Primary | ICD-10-CM

## 2025-02-19 PROCEDURE — 93798 PHYS/QHP OP CAR RHAB W/ECG: CPT

## 2025-02-20 ENCOUNTER — CARDPULM VISIT (OUTPATIENT)
Dept: CARDIAC REHAB | Facility: HOSPITAL | Age: 60
End: 2025-02-20
Attending: INTERNAL MEDICINE
Payer: COMMERCIAL

## 2025-02-20 PROCEDURE — 93798 PHYS/QHP OP CAR RHAB W/ECG: CPT

## 2025-02-24 ENCOUNTER — PATIENT MESSAGE (OUTPATIENT)
Dept: INTERNAL MEDICINE CLINIC | Facility: CLINIC | Age: 60
End: 2025-02-24

## 2025-02-24 ENCOUNTER — CARDPULM VISIT (OUTPATIENT)
Dept: CARDIAC REHAB | Facility: HOSPITAL | Age: 60
End: 2025-02-24
Attending: INTERNAL MEDICINE
Payer: COMMERCIAL

## 2025-02-24 PROCEDURE — 93798 PHYS/QHP OP CAR RHAB W/ECG: CPT

## 2025-02-26 ENCOUNTER — CARDPULM VISIT (OUTPATIENT)
Dept: CARDIAC REHAB | Facility: HOSPITAL | Age: 60
End: 2025-02-26
Attending: INTERNAL MEDICINE
Payer: COMMERCIAL

## 2025-02-26 PROCEDURE — 93798 PHYS/QHP OP CAR RHAB W/ECG: CPT

## 2025-02-27 ENCOUNTER — HOSPITAL ENCOUNTER (OUTPATIENT)
Dept: ULTRASOUND IMAGING | Facility: HOSPITAL | Age: 60
Discharge: HOME | End: 2025-02-27
Attending: CLINICAL NURSE SPECIALIST
Payer: COMMERCIAL

## 2025-02-27 DIAGNOSIS — M79.89 LEFT LEG SWELLING: ICD-10-CM

## 2025-02-27 DIAGNOSIS — Z95.1 S/P CABG (CORONARY ARTERY BYPASS GRAFT): ICD-10-CM

## 2025-02-27 DIAGNOSIS — M79.605 PAIN IN LEFT LEG: ICD-10-CM

## 2025-02-27 PROCEDURE — 93971 EXTREMITY STUDY: CPT | Performed by: CLINICAL NURSE SPECIALIST

## 2025-03-03 ENCOUNTER — CARDPULM VISIT (OUTPATIENT)
Dept: CARDIAC REHAB | Facility: HOSPITAL | Age: 60
End: 2025-03-03
Attending: INTERNAL MEDICINE
Payer: COMMERCIAL

## 2025-03-03 PROCEDURE — 93798 PHYS/QHP OP CAR RHAB W/ECG: CPT

## 2025-03-04 ENCOUNTER — TELEPHONE (OUTPATIENT)
Dept: INTERNAL MEDICINE CLINIC | Facility: CLINIC | Age: 60
End: 2025-03-04

## 2025-03-04 DIAGNOSIS — I25.810 ATHEROSCLEROSIS OF AUTOLOGOUS VEIN CORONARY ARTERY BYPASS GRAFT, UNSPECIFIED WHETHER ANGINA PRESENT: Primary | ICD-10-CM

## 2025-03-04 NOTE — TELEPHONE ENCOUNTER
Cardiology referral for Dr. Mallory entered and auto authorized. RN [facility] Mychart notification sent to Luis Miguel

## 2025-03-04 NOTE — TELEPHONE ENCOUNTER
Patient called to request a new referral to her Cardiologist:    Evan Mallory MD     Patient needs to schedule with Dr. Mallory to receive a heart rate monitor.    Please call patient when referral is in the system and available to schedule:    [phone]     KG

## 2025-03-05 ENCOUNTER — CARDPULM VISIT (OUTPATIENT)
Dept: CARDIAC REHAB | Facility: HOSPITAL | Age: 60
End: 2025-03-05
Attending: INTERNAL MEDICINE
Payer: COMMERCIAL

## 2025-03-05 PROCEDURE — 93798 PHYS/QHP OP CAR RHAB W/ECG: CPT

## 2025-03-06 ENCOUNTER — OFFICE VISIT (OUTPATIENT)
Dept: INTERNAL MEDICINE CLINIC | Facility: CLINIC | Age: 60
End: 2025-03-06
Payer: COMMERCIAL

## 2025-03-06 ENCOUNTER — CARDPULM VISIT (OUTPATIENT)
Dept: CARDIAC REHAB | Facility: HOSPITAL | Age: 60
End: 2025-03-06
Attending: INTERNAL MEDICINE
Payer: COMMERCIAL

## 2025-03-06 VITALS
BODY MASS INDEX: 33.45 KG/M2 | HEART RATE: 53 BPM | DIASTOLIC BLOOD PRESSURE: 72 MMHG | OXYGEN SATURATION: 96 % | WEIGHT: 225.81 LBS | SYSTOLIC BLOOD PRESSURE: 120 MMHG | HEIGHT: 69 IN

## 2025-03-06 DIAGNOSIS — E04.1 THYROID NODULE: ICD-10-CM

## 2025-03-06 DIAGNOSIS — E66.9 OBESITY (BMI 30-39.9): ICD-10-CM

## 2025-03-06 DIAGNOSIS — L03.90 CELLULITIS, UNSPECIFIED CELLULITIS SITE: Primary | ICD-10-CM

## 2025-03-06 DIAGNOSIS — I25.810 CORONARY ARTERY DISEASE INVOLVING CORONARY BYPASS GRAFT OF NATIVE HEART, UNSPECIFIED WHETHER ANGINA PRESENT: ICD-10-CM

## 2025-03-06 PROCEDURE — 93798 PHYS/QHP OP CAR RHAB W/ECG: CPT

## 2025-03-06 PROCEDURE — 99214 OFFICE O/P EST MOD 30 MIN: CPT | Performed by: INTERNAL MEDICINE

## 2025-03-06 PROCEDURE — 3008F BODY MASS INDEX DOCD: CPT | Performed by: INTERNAL MEDICINE

## 2025-03-06 PROCEDURE — 3078F DIAST BP <80 MM HG: CPT | Performed by: INTERNAL MEDICINE

## 2025-03-06 PROCEDURE — 3074F SYST BP LT 130 MM HG: CPT | Performed by: INTERNAL MEDICINE

## 2025-03-06 RX ORDER — DOXYCYCLINE HYCLATE 100 MG
100 TABLET ORAL 2 TIMES DAILY
Qty: 20 TABLET | Refills: 0 | Status: SHIPPED | OUTPATIENT
Start: 2025-03-06 | End: 2025-03-16

## 2025-03-06 RX ORDER — TOPIRAMATE 25 MG/1
25 TABLET, FILM COATED ORAL 2 TIMES DAILY
Qty: 60 TABLET | Refills: 2 | Status: SHIPPED | OUTPATIENT
Start: 2025-03-06 | End: 2025-04-05

## 2025-03-06 NOTE — PROGRESS NOTES
Olimpia Medina is a 60 year old female.    Chief complaint:weight loss follow up , medication refill, therapeutic drug monitoring    HPI:   OLIMPIA here for follow up on weight loss   Wt Readings from Last 12 Encounters:   03/06/25 225 lb 12.8 oz (102.4 kg)   02/11/25 229 lb 6.4 oz (104.1 kg)   01/12/25 226 lb 13.7 oz (102.9 kg)   10/28/24 239 lb (108.4 kg)   11/05/24 241 lb 6.4 oz (109.5 kg)   06/26/24 234 lb 3.2 oz (106.2 kg)   07/12/21 218 lb 6.4 oz (99.1 kg)   06/28/21 213 lb (96.6 kg)   06/16/21 221 lb (100.2 kg)   06/07/21 221 lb (100.2 kg)   05/11/21 223 lb (101.2 kg)   11/02/20 222 lb (100.7 kg)     Starting weight: 241 lbs   Total weight loss: 16 lbs   Medication: zepbound 2.5 mg         Typical diet   Breakfast: Lunch: Dinner: Snacks:    Soups   [place]    Chicken and rice for dinner   Hasnot had ny take out for a long time   Hard boiled egg for breakfast and coffee    Nuts   Some pretzels    Not counting carbs or protein       Soda/ juice/ alcohol: no soda   Alcohol wine every once in a while   Water with the packets   Electrolytes     Water intake: adequate  Exercise: Yes: cardiac rehab   Challenges: cookies at night \    1 week ago  Had left leg red lesion   Area of redness of the skin where she had the surgery   She did see the surgeon  AROLDO  Had US venous doppler was negative   Was started on cipro  Painful not as tender as when it first started   Feels 50% better  No more discharge   Has been wearing her compression stocks   No fever or chills             Current Outpatient Medications   Medication Sig Dispense Refill    ciprofloxacin (CIPRO) 500 MG Oral Tab Take 1 tablet (500 mg total) by mouth 2 (two) times daily. 14 tablet 0    ketoconazole 2 % External Cream Apply 1 Application topically daily. 60 g 3    metoprolol tartrate 25 MG Oral Tab Take 1 tablet (25 mg total) by mouth 2x Daily(Beta Blocker). 60 tablet 0    ascorbic acid 500 MG Oral Tab Take 1 tablet (500 mg total) by mouth 2 (two) times  daily. 60 tablet 0    clopidogrel (PLAVIX) 75 MG Oral Tab Take 1 tablet (75 mg total) by mouth daily. 90 tablet 0    multivitamin Oral Tab Take 1 tablet by mouth daily. 90 tablet 0    ergocalciferol 1.25 MG (26516 UT) Oral Cap Take 1 capsule (50,000 Units total) by mouth once a week. 12 capsule 0    aspirin 81 MG Oral Tab EC Take 1 tablet (81 mg total) by mouth daily.      levothyroxine 25 MCG Oral Tab Take 1 tablet (25 mcg total) by mouth before breakfast.      furosemide 20 MG Oral Tab Take 1 tablet (20 mg total) by mouth daily as needed. (Patient not taking: Reported on 3/6/2025) 30 tablet 0    Potassium Chloride ER 10 MEQ Oral Tab CR Take 1 tablet (10 mEq total) by mouth daily. (Patient not taking: Reported on 3/6/2025) 30 tablet 1    Tirzepatide-Weight Management (ZEPBOUND) 5 MG/0.5ML Subcutaneous Solution Inject 5 mg into the skin once a week. (Patient not taking: Reported on 3/6/2025) 6 mL 0      Past Medical History:    Arrhythmia    Hx of irregular heartbeat, cardiac workup completed 2016, negative per cardiologist Dr. Joey Orantes, no treatment    Back problem    Neck pain, into left hand with numbness and tingling    Coronary atherosclerosis    DEPRESSION    Disorder of thyroid    HERPES SIMPLEX    shingles on right upper arm, genital, not oral    High blood pressure    High cholesterol    Hx of motion sickness    HYPERLIPIDEMIA    Low grade mucinous neoplasm of appendix    Migraines    Blurred Vision r/t headache    Migraines    NEUROCARDIOGENIC DISEASE    Other and unspecified hyperlipidemia    Thyroid disease     Past Surgical History:   Procedure Laterality Date    Appendectomy  07/10/2018    Laparoscopic Cecectomy, Drainage of Appendiceal abcess    Appendectomy      Colonoscopy N/A 08/13/2018    Procedure: COLONOSCOPY, POSSIBLE BIOPSY, POSSIBLE POLYPECTOMY 74461;  Surgeon: Guillermo Meyer MD;  Location: [facility]    Colonoscopy N/A 06/28/2021    Procedure: COLONOSCOPY;  Surgeon:  Guillermo Meyer MD;  [facility]    Lasik enhancement - od - right eye Bilateral     Other surgical history  01/10/2011    flex richy, dr alvarez    Other surgical history  2011    Rt knee torn meniscus repair    Other surgical history Left 2015    Procedure: KNEE ARTHROSCOPY;  Surgeon: Eduardo Hidalgo MD;  Location:  MAIN OR    Other surgical history  2018    robotic right hemicolectomy    Other surgical history      lef t meniscus repair knee        Social History:  Social History     Socioeconomic History    Marital status:     Number of children: 2   Tobacco Use    Smoking status: Never    Smokeless tobacco: Never   Vaping Use    Vaping status: Never Used   Substance and Sexual Activity    Alcohol use: Yes     Comment: occ    Drug use: No    Sexual activity: Yes     Partners: Male     Birth control/protection: Vasectomy   Other Topics Concern    Caffeine Concern No    Seat Belt Yes    History of tanning Yes    Reaction to local anesthetic No    Pt has a pacemaker No    Pt has a defibrillator No     Social Drivers of Health     Food Insecurity: No Food Insecurity (2025)    Food Insecurity     Food Insecurity: Never true   Transportation Needs: No Transportation Needs (2025)    Transportation Needs     Lack of Transportation: No   Housing Stability: Low Risk  (2025)    Housing Stability     Housing Instability: No        Family History   Problem Relation Age of Onset    Hypertension Mother     Lipids Mother     Heart Disorder Mother         heavy smoker    Other (PVD) Mother     Diabetes Maternal Grandmother     Hypertension Maternal Grandmother     Lipids Maternal Grandmother     Obesity Maternal Grandmother     Heart Disorder Maternal Grandmother         MI  60's    Cancer Paternal Grandmother         colon cancer 50's, 92    Cancer Paternal Grandfather         bladder ca 60's    Cancer Father         Bladder cancer,  late 60'd     Hypertension Brother     Ovarian Cancer Paternal Cousin Female         under 50     Patient Active Problem List   Diagnosis    Low grade mucinous neoplasm of appendix    Preoperative testing    Lipoma of left upper extremity    Hepatic steatosis    Microscopic hematuria    Obesity (BMI 30-39.9)    Mixed hyperlipidemia    Mass of left forearm    Essential hypertension    Liver cyst    Vitamin D deficiency    Encounter for screening mammogram for malignant neoplasm of breast    Hyperlipidemia    Sensation of plugged ear on right side    Impacted cerumen of right ear    Elevated coronary artery calcium score    CAD (coronary artery disease)               REVIEW OF SYSTEMS:   A comprehensive 10 point review of systems was completed.  Pertinent positives and negatives noted in the the HPI          PHYSICAL EXAM:   /72   Pulse 53   Ht 5' 9\" (1.753 m)   Wt 225 lb 12.8 oz (102.4 kg)   LMP 01/15/2016 (Approximate)   SpO2 96%   BMI 33.34 kg/m²   GENERAL: well developed, well nourished,in no apparent distress  LUNGS: clear to auscultation  CARDIO: RRR without murmur  NEURO: no gross deficits              No orders of the defined types were placed in this encounter.        ASSESSMENT/PLAN:       ICD-10-CM    1. Cellulitis, unspecified cellulitis site  L03.90 topiramate 25 MG Oral Tab     Doxycycline Hyclate 100 MG Oral Tab      2. Obesity (BMI 30-39.9)  E66.9 topiramate 25 MG Oral Tab     Doxycycline Hyclate 100 MG Oral Tab      3. Coronary artery disease involving coronary bypass graft of native heart, unspecified whether angina present  I25.810 topiramate 25 MG Oral Tab     Doxycycline Hyclate 100 MG Oral Tab      4. Thyroid nodule  E04.1 topiramate 25 MG Oral Tab     Doxycycline Hyclate 100 MG Oral Tab           Plan:  Patient has lost 4 lbs # since LOV. Patient has lost a total weight loss of 16 lbs # since first weight loss consult.  Labs reviewed  Advised to continue with low carb diet   Goal is less than  100  g per day  Advised to read nutrition labels  Log in carbs if possible   Increase protein intake   exercise goal is 1 hour 3 times per week cardio and strength training   discussed challenges with weight loss   Weigh once a week at least   Avoid sugary drinks or artificially sweetened drinks  Water intake goal is 64 oz per day   Goal weight loss is 11 lbs in 3 months   Continue zepbound due to high cost and limitation of increasing the dose   Will add topamax for craving and night eating  no history of kidney stone   Advised to start Doxycyline for cellulitis for MRSA coverage ( held off on clindamycin since she was recently started on cipro and higher risk of C diff )  Advised to take probiotics   Discussed alarming signs       Plan:  Nutrition: low carb diet   Referral RD/nutritionist : no  Behavior:  Motivational interviewing performed      Discussed strategies to overcome habits/challenges       Reviewed:  Nutrition and the importance of regular protein intake  Labs ordered:    no  Treatment plan   I spent 30 minutes at the day of the service seeing the patient, examination, reviewing labs, independently interpreting results, completing charting and counseling the patient and/or on coordination of care.  The diagnosis, prognosis, and general treatment was explained to the patient.   Please return to the clinic if you are having persistent or worsening symptoms   Tori Samuels MD,   Diplomate of the American Board of Internal Medicine  Diplomate of the American Board of Obesity Medicine

## 2025-03-10 ENCOUNTER — CARDPULM VISIT (OUTPATIENT)
Dept: CARDIAC REHAB | Facility: HOSPITAL | Age: 60
End: 2025-03-10
Attending: INTERNAL MEDICINE
Payer: COMMERCIAL

## 2025-03-10 PROCEDURE — 93798 PHYS/QHP OP CAR RHAB W/ECG: CPT

## 2025-03-12 ENCOUNTER — CARDPULM VISIT (OUTPATIENT)
Dept: CARDIAC REHAB | Facility: HOSPITAL | Age: 60
End: 2025-03-12
Attending: INTERNAL MEDICINE
Payer: COMMERCIAL

## 2025-03-12 PROCEDURE — 93798 PHYS/QHP OP CAR RHAB W/ECG: CPT

## 2025-03-13 ENCOUNTER — APPOINTMENT (OUTPATIENT)
Dept: CARDIAC REHAB | Facility: HOSPITAL | Age: 60
End: 2025-03-13
Attending: INTERNAL MEDICINE
Payer: COMMERCIAL

## 2025-03-15 ENCOUNTER — LAB ENCOUNTER (OUTPATIENT)
Dept: LAB | Facility: HOSPITAL | Age: 60
End: 2025-03-15
Attending: INTERNAL MEDICINE
Payer: COMMERCIAL

## 2025-03-15 DIAGNOSIS — M79.89 LEG SWELLING: ICD-10-CM

## 2025-03-15 DIAGNOSIS — D69.6 THROMBOCYTOPENIA: ICD-10-CM

## 2025-03-15 DIAGNOSIS — D64.9 ANEMIA, UNSPECIFIED TYPE: ICD-10-CM

## 2025-03-15 DIAGNOSIS — E04.1 THYROID NODULE: ICD-10-CM

## 2025-03-15 DIAGNOSIS — I25.810 CORONARY ARTERY DISEASE INVOLVING CORONARY BYPASS GRAFT OF NATIVE HEART, UNSPECIFIED WHETHER ANGINA PRESENT: ICD-10-CM

## 2025-03-15 DIAGNOSIS — Z95.1 S/P CABG (CORONARY ARTERY BYPASS GRAFT): ICD-10-CM

## 2025-03-15 DIAGNOSIS — D37.3 LOW GRADE MUCINOUS NEOPLASM OF APPENDIX: ICD-10-CM

## 2025-03-15 DIAGNOSIS — Z09 HOSPITAL DISCHARGE FOLLOW-UP: ICD-10-CM

## 2025-03-15 LAB
ALBUMIN SERPL-MCNC: 4.6 G/DL (ref 3.2–4.8)
ALBUMIN/GLOB SERPL: 2 {RATIO} (ref 1–2)
ALP LIVER SERPL-CCNC: 54 U/L
ALT SERPL-CCNC: 36 U/L
ANION GAP SERPL CALC-SCNC: 6 MMOL/L (ref 0–18)
AST SERPL-CCNC: 24 U/L (ref ?–34)
BASOPHILS # BLD AUTO: 0.04 X10(3) UL (ref 0–0.2)
BASOPHILS NFR BLD AUTO: 0.7 %
BILIRUB SERPL-MCNC: 0.6 MG/DL (ref 0.3–1.2)
BUN BLD-MCNC: 18 MG/DL (ref 9–23)
BUN/CREAT SERPL: 16.4 (ref 10–20)
CALCIUM BLD-MCNC: 9.3 MG/DL (ref 8.7–10.4)
CHLORIDE SERPL-SCNC: 111 MMOL/L (ref 98–112)
CHOLEST SERPL-MCNC: 130 MG/DL (ref ?–200)
CO2 SERPL-SCNC: 25 MMOL/L (ref 21–32)
CREAT BLD-MCNC: 1.1 MG/DL
DEPRECATED HBV CORE AB SER IA-ACNC: 62 NG/ML
DEPRECATED RDW RBC AUTO: 36.6 FL (ref 35.1–46.3)
EGFRCR SERPLBLD CKD-EPI 2021: 58 ML/MIN/1.73M2 (ref 60–?)
EOSINOPHIL # BLD AUTO: 0.11 X10(3) UL (ref 0–0.7)
EOSINOPHIL NFR BLD AUTO: 2 %
ERYTHROCYTE [DISTWIDTH] IN BLOOD BY AUTOMATED COUNT: 11.9 % (ref 11–15)
FASTING PATIENT LIPID ANSWER: YES
FASTING STATUS PATIENT QL REPORTED: YES
FOLATE SERPL-MCNC: >48 NG/ML (ref 5.4–?)
GLOBULIN PLAS-MCNC: 2.3 G/DL (ref 2–3.5)
GLUCOSE BLD-MCNC: 96 MG/DL (ref 70–99)
HCT VFR BLD AUTO: 42.8 %
HDLC SERPL-MCNC: 42 MG/DL (ref 40–59)
HGB BLD-MCNC: 14.9 G/DL
IMM GRANULOCYTES # BLD AUTO: 0.01 X10(3) UL (ref 0–1)
IMM GRANULOCYTES NFR BLD: 0.2 %
IRON SATN MFR SERPL: 23 %
IRON SERPL-MCNC: 66 UG/DL
LDLC SERPL CALC-MCNC: 67 MG/DL (ref ?–100)
LYMPHOCYTES # BLD AUTO: 1.77 X10(3) UL (ref 1–4)
LYMPHOCYTES NFR BLD AUTO: 31.8 %
MCH RBC QN AUTO: 29.4 PG (ref 26–34)
MCHC RBC AUTO-ENTMCNC: 34.8 G/DL (ref 31–37)
MCV RBC AUTO: 84.6 FL
MONOCYTES # BLD AUTO: 0.4 X10(3) UL (ref 0.1–1)
MONOCYTES NFR BLD AUTO: 7.2 %
NEUTROPHILS # BLD AUTO: 3.24 X10 (3) UL (ref 1.5–7.7)
NEUTROPHILS # BLD AUTO: 3.24 X10(3) UL (ref 1.5–7.7)
NEUTROPHILS NFR BLD AUTO: 58.1 %
NONHDLC SERPL-MCNC: 88 MG/DL (ref ?–130)
OSMOLALITY SERPL CALC.SUM OF ELEC: 296 MOSM/KG (ref 275–295)
PLATELET # BLD AUTO: 162 10(3)UL (ref 150–450)
POTASSIUM SERPL-SCNC: 4.3 MMOL/L (ref 3.5–5.1)
PROT SERPL-MCNC: 6.9 G/DL (ref 5.7–8.2)
RBC # BLD AUTO: 5.06 X10(6)UL
SODIUM SERPL-SCNC: 142 MMOL/L (ref 136–145)
TOTAL IRON BINDING CAPACITY: 292 UG/DL (ref 250–425)
TRANSFERRIN SERPL-MCNC: 227 MG/DL (ref 250–380)
TRIGL SERPL-MCNC: 116 MG/DL (ref 30–149)
TSI SER-ACNC: 3.64 UIU/ML (ref 0.55–4.78)
VIT B12 SERPL-MCNC: 616 PG/ML (ref 211–911)
VLDLC SERPL CALC-MCNC: 17 MG/DL (ref 0–30)
WBC # BLD AUTO: 5.6 X10(3) UL (ref 4–11)

## 2025-03-15 PROCEDURE — 83540 ASSAY OF IRON: CPT

## 2025-03-15 PROCEDURE — 84443 ASSAY THYROID STIM HORMONE: CPT

## 2025-03-15 PROCEDURE — 82746 ASSAY OF FOLIC ACID SERUM: CPT

## 2025-03-15 PROCEDURE — 80061 LIPID PANEL: CPT

## 2025-03-15 PROCEDURE — 36415 COLL VENOUS BLD VENIPUNCTURE: CPT

## 2025-03-15 PROCEDURE — 82607 VITAMIN B-12: CPT

## 2025-03-15 PROCEDURE — 84466 ASSAY OF TRANSFERRIN: CPT

## 2025-03-15 PROCEDURE — 82728 ASSAY OF FERRITIN: CPT

## 2025-03-15 PROCEDURE — 80053 COMPREHEN METABOLIC PANEL: CPT

## 2025-03-15 PROCEDURE — 85025 COMPLETE CBC W/AUTO DIFF WBC: CPT

## 2025-03-16 DIAGNOSIS — K76.0 HEPATIC STEATOSIS: ICD-10-CM

## 2025-03-16 DIAGNOSIS — E78.5 HYPERLIPIDEMIA, UNSPECIFIED HYPERLIPIDEMIA TYPE: ICD-10-CM

## 2025-03-16 DIAGNOSIS — H61.21 IMPACTED CERUMEN OF RIGHT EAR: ICD-10-CM

## 2025-03-16 DIAGNOSIS — R93.1 ELEVATED CORONARY ARTERY CALCIUM SCORE: ICD-10-CM

## 2025-03-16 DIAGNOSIS — H93.8X1 SENSATION OF PLUGGED EAR ON RIGHT SIDE: ICD-10-CM

## 2025-03-16 DIAGNOSIS — N17.9 AKI (ACUTE KIDNEY INJURY): Primary | ICD-10-CM

## 2025-03-16 DIAGNOSIS — E66.9 OBESITY (BMI 30-39.9): ICD-10-CM

## 2025-03-17 ENCOUNTER — CARDPULM VISIT (OUTPATIENT)
Dept: CARDIAC REHAB | Facility: HOSPITAL | Age: 60
End: 2025-03-17
Attending: INTERNAL MEDICINE
Payer: COMMERCIAL

## 2025-03-17 DIAGNOSIS — D37.3 LOW GRADE MUCINOUS NEOPLASM OF APPENDIX: ICD-10-CM

## 2025-03-17 DIAGNOSIS — Z95.1 S/P CABG (CORONARY ARTERY BYPASS GRAFT): ICD-10-CM

## 2025-03-17 DIAGNOSIS — E04.1 THYROID NODULE: ICD-10-CM

## 2025-03-17 DIAGNOSIS — D69.6 THROMBOCYTOPENIA: ICD-10-CM

## 2025-03-17 DIAGNOSIS — D64.9 ANEMIA, UNSPECIFIED TYPE: ICD-10-CM

## 2025-03-17 DIAGNOSIS — M79.89 LEG SWELLING: ICD-10-CM

## 2025-03-17 DIAGNOSIS — Z09 HOSPITAL DISCHARGE FOLLOW-UP: ICD-10-CM

## 2025-03-17 DIAGNOSIS — I25.810 CORONARY ARTERY DISEASE INVOLVING CORONARY BYPASS GRAFT OF NATIVE HEART, UNSPECIFIED WHETHER ANGINA PRESENT: ICD-10-CM

## 2025-03-17 PROCEDURE — 93798 PHYS/QHP OP CAR RHAB W/ECG: CPT

## 2025-03-17 RX ORDER — FUROSEMIDE 20 MG/1
20 TABLET ORAL
Qty: 30 TABLET | Refills: 0 | Status: SHIPPED | OUTPATIENT
Start: 2025-03-17

## 2025-03-17 NOTE — TELEPHONE ENCOUNTER
Future Appt. 05/05/2025    Last Visit: 03/06/2025    Medication Requested:  Requested Prescriptions     Pending Prescriptions Disp Refills    ERGOCALCIFEROL 1.25 MG (70952 UT) Oral Cap [Pharmacy Med Name: Vitamin D 50,000 Unit Cap Bion] 12 capsule 0     Sig: Take 1 capsule (50,000 Units total) by mouth once a week.        Last refill:      Disp Refills Start End     ergocalciferol 1.25 MG (64606 UT) Oral Cap 12 capsule 0 12/25/2024 --    Sig - Route: Take 1 capsule (50,000 Units total) by mouth once a week. - Oral

## 2025-03-18 RX ORDER — ERGOCALCIFEROL 1.25 MG/1
50000 CAPSULE, LIQUID FILLED ORAL WEEKLY
Qty: 12 CAPSULE | Refills: 0 | Status: SHIPPED | OUTPATIENT
Start: 2025-03-18

## 2025-03-19 ENCOUNTER — CARDPULM VISIT (OUTPATIENT)
Dept: CARDIAC REHAB | Facility: HOSPITAL | Age: 60
End: 2025-03-19
Attending: INTERNAL MEDICINE
Payer: COMMERCIAL

## 2025-03-19 PROCEDURE — 93798 PHYS/QHP OP CAR RHAB W/ECG: CPT

## 2025-03-20 ENCOUNTER — CARDPULM VISIT (OUTPATIENT)
Dept: CARDIAC REHAB | Facility: HOSPITAL | Age: 60
End: 2025-03-20
Attending: INTERNAL MEDICINE
Payer: COMMERCIAL

## 2025-03-20 PROCEDURE — 93798 PHYS/QHP OP CAR RHAB W/ECG: CPT

## 2025-03-24 ENCOUNTER — CARDPULM VISIT (OUTPATIENT)
Dept: CARDIAC REHAB | Facility: HOSPITAL | Age: 60
End: 2025-03-24
Attending: INTERNAL MEDICINE
Payer: COMMERCIAL

## 2025-03-24 PROCEDURE — 93798 PHYS/QHP OP CAR RHAB W/ECG: CPT

## 2025-03-26 ENCOUNTER — APPOINTMENT (OUTPATIENT)
Dept: CARDIAC REHAB | Facility: HOSPITAL | Age: 60
End: 2025-03-26
Attending: INTERNAL MEDICINE
Payer: COMMERCIAL

## 2025-03-26 NOTE — DISCHARGE INSTRUCTIONS
Discharge Instructions for Fine-Needle Thyroid Biopsy  You have had a procedure called fine-needle thyroid biopsy. This biopsy was done to learn more about a nodule or cyst in your thyroid gland or to find out what might be causing enlargement of your thyroid. During the biopsy, a very thin needle is inserted through the skin into the gland. The needle is used to remove a small amount of tissue from the gland. More than 1 tissue sample may be done to be sure to get cells from all parts of the nodule. Or the needle might be used to drain fluid from a cyst. Often a special ultrasound probe or transducer is used to help guide the needle to the right place. The tissue or fluid is then studied in a lab.    Home care  Here are some tips to take care of yourself at home:   You will have a small adhesive bandage on your biopsy site. Leave the bandage on for 4 to 6 hours. After this time, you don't need to keep it covered.   If you feel discomfort after the biopsy, take over-the-counter pain medicine. Don't take aspirin. It's normal to feel sore for a day or 2.  Ask your healthcare provider when you can return to normal activities. This will likely be the same day as your procedure.  Getting your results  Your biopsy results may take a few days. When the results are ready, your healthcare provider will discuss them with you and tell you what, if anything, needs to be done next. If you have questions, consider writing them down so you won't forget to ask when the provider calls.   Follow-up  Make a follow-up appointment as directed by your healthcare provider.  When to call your healthcare provider  Call your healthcare provider right away if you have any of the following:  Bleeding that won’t stop  Shortness of breath, trouble breathing or speaking, or a change in your voice  Fever of 100.4°F (38°C) or higher, or as directed by your provider  Increasing pain, redness, tenderness, or drainage at the biopsy site  Swelling of  the biopsy site  Be sure you understand what problems you should watch for and know how to reach the healthcare provider after hours and on weekends.    Demetri last reviewed this educational content on 12/1/2021 © 2000-2023 [employer]. All rights reserved. This information is not intended as a substitute for professional medical care. Always follow your healthcare professional's instructions.

## 2025-03-26 NOTE — IMAGING NOTE
0852 Called pt after [facility] RN called to say MD approves holding Plavix 5 days prior ASA 1 day prior to FNA, they will fax approval. When I checked pt's appt date, noted FNA is scheduled tomorrow. Pt said she didn't get their call yet, she has had both meds on hold. Copied following is my Contact note from 3/20 at 4:06 pm: Called w/FNA instructions, ASA & Plavix on med list, pt states had CABG 1/2025. Instructed her to call cardiology office now, if MD approves, hold both after tommorrow, otherwise ask MD when she will be ok to be off & can reschedule. Pt understands.     At this time informed her she should have waited for MD approval, pt said she may as well proceed since off med, I told her she can,  since off meds & biopsy tomorrow, will proceed.

## 2025-03-27 ENCOUNTER — APPOINTMENT (OUTPATIENT)
Dept: CARDIAC REHAB | Facility: HOSPITAL | Age: 60
End: 2025-03-27
Attending: INTERNAL MEDICINE
Payer: COMMERCIAL

## 2025-03-27 ENCOUNTER — HOSPITAL ENCOUNTER (OUTPATIENT)
Dept: ULTRASOUND IMAGING | Facility: HOSPITAL | Age: 60
Discharge: HOME | End: 2025-03-27
Attending: INTERNAL MEDICINE
Payer: COMMERCIAL

## 2025-03-27 VITALS — HEART RATE: 60 BPM | RESPIRATION RATE: 16 BRPM | SYSTOLIC BLOOD PRESSURE: 124 MMHG | DIASTOLIC BLOOD PRESSURE: 67 MMHG

## 2025-03-27 DIAGNOSIS — E04.1 THYROID NODULE: ICD-10-CM

## 2025-03-27 PROCEDURE — 88177 CYTP FNA EVAL EA ADDL: CPT | Performed by: INTERNAL MEDICINE

## 2025-03-27 PROCEDURE — 88173 CYTOPATH EVAL FNA REPORT: CPT | Performed by: INTERNAL MEDICINE

## 2025-03-27 PROCEDURE — 88172 CYTP DX EVAL FNA 1ST EA SITE: CPT | Performed by: INTERNAL MEDICINE

## 2025-03-27 PROCEDURE — 10005 FNA BX W/US GDN 1ST LES: CPT | Performed by: INTERNAL MEDICINE

## 2025-03-27 NOTE — IMAGING NOTE
1403 Pt arrived to ultrasound room #2     1406 Scans taken by katelyn WALSH  sonographer     1410 History taken and as follows:  abnormal thyroid US .      1411 Procedure explained questions answered.    1412 Consent verified and obtained      1421  scans reviewed by Dr. NARAYAN    Site marked LEFT THYROID NODULE     1423 Time out taken      1424 Area cleaned sterile towels  to site. Pathology  was  notified.      1427 Lidocaine 1% 10 milligrams per ml  from kit  was given 2.5 ml    at 1427     FNA # 1 taken at  1429 with 25 g needle    FNA # 2 taken at 1430  with 25 g needle    1435 Procedure completed area re scanned . Area cleaned band aid to site ice pack to site.        1439 Post instructions given  verbal et written with AVS summary sheet provided to patient.  Also instructed patient to refrain from drinking or eating anything hot for several hours after biopsy  to prevent increase bleeding from occurring.    1445  Pt  discharged .

## 2025-03-31 ENCOUNTER — CARDPULM VISIT (OUTPATIENT)
Dept: CARDIAC REHAB | Facility: HOSPITAL | Age: 60
End: 2025-03-31
Attending: INTERNAL MEDICINE
Payer: COMMERCIAL

## 2025-03-31 PROCEDURE — 93798 PHYS/QHP OP CAR RHAB W/ECG: CPT

## 2025-04-02 ENCOUNTER — CARDPULM VISIT (OUTPATIENT)
Dept: CARDIAC REHAB | Facility: HOSPITAL | Age: 60
End: 2025-04-02
Attending: INTERNAL MEDICINE
Payer: COMMERCIAL

## 2025-04-02 PROCEDURE — 93798 PHYS/QHP OP CAR RHAB W/ECG: CPT

## 2025-04-03 ENCOUNTER — CARDPULM VISIT (OUTPATIENT)
Dept: CARDIAC REHAB | Facility: HOSPITAL | Age: 60
End: 2025-04-03
Attending: INTERNAL MEDICINE
Payer: COMMERCIAL

## 2025-04-03 PROCEDURE — 93798 PHYS/QHP OP CAR RHAB W/ECG: CPT

## 2025-04-07 ENCOUNTER — CARDPULM VISIT (OUTPATIENT)
Dept: CARDIAC REHAB | Facility: HOSPITAL | Age: 60
End: 2025-04-07
Attending: INTERNAL MEDICINE
Payer: COMMERCIAL

## 2025-04-07 PROCEDURE — 93798 PHYS/QHP OP CAR RHAB W/ECG: CPT

## 2025-04-09 ENCOUNTER — HOSPITAL ENCOUNTER (OUTPATIENT)
Facility: HOSPITAL | Age: 60
Setting detail: HOSPITAL OUTPATIENT SURGERY
Discharge: HOME | End: 2025-04-09
Attending: INTERNAL MEDICINE | Admitting: INTERNAL MEDICINE
Payer: COMMERCIAL

## 2025-04-09 ENCOUNTER — APPOINTMENT (OUTPATIENT)
Dept: CARDIAC REHAB | Facility: HOSPITAL | Age: 60
End: 2025-04-09
Attending: INTERNAL MEDICINE
Payer: COMMERCIAL

## 2025-04-09 ENCOUNTER — ANESTHESIA EVENT (OUTPATIENT)
Dept: ENDOSCOPY | Facility: HOSPITAL | Age: 60
End: 2025-04-09
Payer: COMMERCIAL

## 2025-04-09 ENCOUNTER — ANESTHESIA (OUTPATIENT)
Dept: ENDOSCOPY | Facility: HOSPITAL | Age: 60
End: 2025-04-09
Payer: COMMERCIAL

## 2025-04-09 VITALS
SYSTOLIC BLOOD PRESSURE: 129 MMHG | OXYGEN SATURATION: 99 % | DIASTOLIC BLOOD PRESSURE: 77 MMHG | RESPIRATION RATE: 14 BRPM | WEIGHT: 218 LBS | BODY MASS INDEX: 32.29 KG/M2 | HEART RATE: 48 BPM | HEIGHT: 69 IN

## 2025-04-09 DIAGNOSIS — D37.3 LOW GRADE MUCINOUS NEOPLASM OF APPENDIX: ICD-10-CM

## 2025-04-09 DIAGNOSIS — Z12.11 COLON CANCER SCREENING: ICD-10-CM

## 2025-04-09 PROBLEM — K63.5 POLYP OF COLON: Status: ACTIVE | Noted: 2025-04-09

## 2025-04-09 PROCEDURE — 45380 COLONOSCOPY AND BIOPSY: CPT | Performed by: INTERNAL MEDICINE

## 2025-04-09 PROCEDURE — 0DBL8ZX EXCISION OF TRANSVERSE COLON, VIA NATURAL OR ARTIFICIAL OPENING ENDOSCOPIC, DIAGNOSTIC: ICD-10-PCS | Performed by: INTERNAL MEDICINE

## 2025-04-09 RX ORDER — SODIUM CHLORIDE, SODIUM LACTATE, POTASSIUM CHLORIDE, CALCIUM CHLORIDE 600; 310; 30; 20 MG/100ML; MG/100ML; MG/100ML; MG/100ML
INJECTION, SOLUTION INTRAVENOUS CONTINUOUS PRN
Status: DISCONTINUED | OUTPATIENT
Start: 2025-04-09 | End: 2025-04-09 | Stop reason: SURG

## 2025-04-09 RX ORDER — MIDAZOLAM HYDROCHLORIDE 1 MG/ML
INJECTION INTRAMUSCULAR; INTRAVENOUS AS NEEDED
Status: DISCONTINUED | OUTPATIENT
Start: 2025-04-09 | End: 2025-04-09 | Stop reason: SURG

## 2025-04-09 RX ORDER — ONDANSETRON 2 MG/ML
INJECTION INTRAMUSCULAR; INTRAVENOUS AS NEEDED
Status: DISCONTINUED | OUTPATIENT
Start: 2025-04-09 | End: 2025-04-09 | Stop reason: SURG

## 2025-04-09 RX ORDER — METOCLOPRAMIDE HYDROCHLORIDE 5 MG/ML
INJECTION INTRAMUSCULAR; INTRAVENOUS AS NEEDED
Status: DISCONTINUED | OUTPATIENT
Start: 2025-04-09 | End: 2025-04-09 | Stop reason: SURG

## 2025-04-09 RX ORDER — SODIUM CHLORIDE, SODIUM LACTATE, POTASSIUM CHLORIDE, CALCIUM CHLORIDE 600; 310; 30; 20 MG/100ML; MG/100ML; MG/100ML; MG/100ML
INJECTION, SOLUTION INTRAVENOUS CONTINUOUS
Status: DISCONTINUED | OUTPATIENT
Start: 2025-04-09 | End: 2025-04-09

## 2025-04-09 RX ORDER — GLYCOPYRROLATE 0.2 MG/ML
INJECTION, SOLUTION INTRAMUSCULAR; INTRAVENOUS AS NEEDED
Status: DISCONTINUED | OUTPATIENT
Start: 2025-04-09 | End: 2025-04-09 | Stop reason: SURG

## 2025-04-09 RX ORDER — LIDOCAINE HYDROCHLORIDE 10 MG/ML
INJECTION, SOLUTION EPIDURAL; INFILTRATION; INTRACAUDAL; PERINEURAL AS NEEDED
Status: DISCONTINUED | OUTPATIENT
Start: 2025-04-09 | End: 2025-04-09 | Stop reason: SURG

## 2025-04-09 RX ADMIN — MIDAZOLAM HYDROCHLORIDE 2 MG: 1 INJECTION INTRAMUSCULAR; INTRAVENOUS at 10:12:00

## 2025-04-09 RX ADMIN — SODIUM CHLORIDE, SODIUM LACTATE, POTASSIUM CHLORIDE, CALCIUM CHLORIDE: 600; 310; 30; 20 INJECTION, SOLUTION INTRAVENOUS at 10:12:00

## 2025-04-09 RX ADMIN — GLYCOPYRROLATE 0.1 MG: 0.2 INJECTION, SOLUTION INTRAMUSCULAR; INTRAVENOUS at 10:17:00

## 2025-04-09 RX ADMIN — SODIUM CHLORIDE, SODIUM LACTATE, POTASSIUM CHLORIDE, CALCIUM CHLORIDE: 600; 310; 30; 20 INJECTION, SOLUTION INTRAVENOUS at 10:41:00

## 2025-04-09 RX ADMIN — LIDOCAINE HYDROCHLORIDE 20 MG: 10 INJECTION, SOLUTION EPIDURAL; INFILTRATION; INTRACAUDAL; PERINEURAL at 10:15:00

## 2025-04-09 RX ADMIN — ONDANSETRON 4 MG: 2 INJECTION INTRAMUSCULAR; INTRAVENOUS at 10:12:00

## 2025-04-09 RX ADMIN — METOCLOPRAMIDE HYDROCHLORIDE 10 MG: 5 INJECTION INTRAMUSCULAR; INTRAVENOUS at 10:10:00

## 2025-04-09 NOTE — ANESTHESIA POSTPROCEDURE EVALUATION
Patient: Olimpia Medina    Procedure Summary       Date: 04/09/25 Room / Location: [facility] ENDOSCOPY 04 / [facility] ENDOSCOPY    Anesthesia Start: 1012 Anesthesia Stop:     Procedure: COLONOSCOPY Diagnosis:       Colon cancer screening      Low grade mucinous neoplasm of appendix      (polyp)    Surgeons: Delilah Armenta MD Anesthesiologist: Guero Aguilar CRNA    Anesthesia Type: MAC ASA Status: 3            Anesthesia Type: MAC    Vitals Value Taken Time   BP 87/42 04/09/25 10:41   Temp 98.0 04/09/25 10:41   Pulse 46 04/09/25 10:41   Resp 8 04/09/25 10:41   SpO2 98 % 04/09/25 10:41   Vitals shown include unfiled device data.    [facility] AN Post Evaluation:   Patient Evaluated in PACU  Patient Participation: complete - patient cannot participate  Level of Consciousness: awake  Pain Score: 0  Pain Management: adequate  Airway Patency:patent  Dental exam unchanged from preop  Yes    Nausea/Vomiting: none  Cardiovascular Status: acceptable and stable  Respiratory Status: face mask  Postoperative Hydration euvolemic      Guero Aguilar CRNA  4/9/2025 10:41 AM

## 2025-04-09 NOTE — H&P
History & Physical Examination    Patient Name: Olimpia Medina  MRN: [id number]  CSN: [id number]  YOB: 1965    Diagnosis: CRC screening, history of hemicolectomy for mucinous neoplasm of the appendix    Prescriptions Prior to Admission[1]  Current Hospital Medications[2]    Allergies: Allergies[3]    Past Medical History[4]  Past Surgical History[5]  Family History[6]  Social History     Tobacco Use    Smoking status: Never    Smokeless tobacco: Never   Substance Use Topics    Alcohol use: Yes         SYSTEM Check if Review is Normal Check if Physical Exam is Normal If not normal, please explain:   HEENT [X ] [ X]    NECK  [X ] [ X]    HEART [X ] [ X]    LUNGS [X ] [ X]    ABDOMEN [X ] [ X]    EXTREMITIES [X ] [ X]    OTHER        I have discussed the risks and benefits and alternatives of the procedure with the patient/family.  They understand and agree to proceed with plan of care.   I have reviewed the History and Physical done within the last 30 days.  Any changes noted above.    Delilah Armenta MD  [facility] - Gastroenterology  2025               [1]   Medications Prior to Admission   Medication Sig Dispense Refill Last Dose/Taking    ERGOCALCIFEROL 1.25 MG (89440 UT) Oral Cap Take 1 capsule (50,000 Units total) by mouth once a week. 12 capsule 0 Taking    FUROSEMIDE 20 MG Oral Tab TAKE ONE TABLET BY MOUTH DAILY AS NEEDED 30 tablet 0 Taking As Needed    [] topiramate 25 MG Oral Tab Take 1 tablet (25 mg total) by mouth 2 (two) times daily. 60 tablet 2 Taking    ketoconazole 2 % External Cream Apply 1 Application topically daily. (Patient taking differently: Apply 1 Application topically daily as needed.) 60 g 3 Taking Differently    metoprolol tartrate 25 MG Oral Tab Take 1 tablet (25 mg total) by mouth 2x Daily(Beta Blocker). 60 tablet 0 Taking    ascorbic acid 500 MG Oral Tab Take 1 tablet (500 mg total) by mouth 2 (two) times daily. 60 tablet 0 Taking    clopidogrel (PLAVIX)  75 MG Oral Tab Take 1 tablet (75 mg total) by mouth daily. 90 tablet 0 Taking    multivitamin Oral Tab Take 1 tablet by mouth daily. 90 tablet 0 Taking    aspirin 81 MG Oral Tab EC Take 1 tablet (81 mg total) by mouth daily.   Taking    levothyroxine 25 MCG Oral Tab Take 1 tablet (25 mcg total) by mouth before breakfast.   Taking    [] rosuvastatin 40 MG Oral Tab Take 1 tablet (40 mg total) by mouth nightly. 90 tablet 1 Taking    [] Doxycycline Hyclate 100 MG Oral Tab Take 1 tablet (100 mg total) by mouth 2 (two) times daily for 10 days. 20 tablet 0     ciprofloxacin (CIPRO) 500 MG Oral Tab Take 1 tablet (500 mg total) by mouth 2 (two) times daily. (Patient not taking: Reported on 2025) 14 tablet 0 Not Taking    Potassium Chloride ER 10 MEQ Oral Tab CR Take 1 tablet (10 mEq total) by mouth daily. (Patient not taking: Reported on 3/6/2025) 30 tablet 1     Tirzepatide-Weight Management (ZEPBOUND) 5 MG/0.5ML Subcutaneous Solution Inject 5 mg into the skin once a week. (Patient not taking: Reported on 3/6/2025) 6 mL 0     [] Tirzepatide-Weight Management (ZEPBOUND) 2.5 MG/0.5ML Subcutaneous Solution Auto-injector Inject 2.5 mg into the skin once a week for 4 doses. 2 mL 0     [] Tirzepatide-Weight Management (ZEPBOUND) 5 MG/0.5ML Subcutaneous Solution Auto-injector Inject 5 mg into the skin once a week for 4 doses. 2 mL 0    [2]   No current facility-administered medications for this encounter.   [3]   Allergies  Allergen Reactions    Pcns [Penicillins] HIVES     Patient has been on meropenem recently  Hives, age 24. Has had no exposure since.   [4]   Past Medical History:   Arrhythmia    Hx of irregular heartbeat, cardiac workup completed , negative per cardiologist Dr. Joey Orantes, no treatment    Back problem    Neck pain, into left hand with numbness and tingling    Coronary atherosclerosis    DEPRESSION    Disorder of thyroid    HERPES SIMPLEX    shingles on right upper arm,  genital, not oral    High blood pressure    High cholesterol    Hx of motion sickness    HYPERLIPIDEMIA    Low grade mucinous neoplasm of appendix    Migraines    Blurred Vision r/t headache    Migraines    NEUROCARDIOGENIC DISEASE    Other and unspecified hyperlipidemia    Thyroid disease   [5]   Past Surgical History:  Procedure Laterality Date    Appendectomy  07/10/2018    Laparoscopic Cecectomy, Drainage of Appendiceal abcess    Appendectomy      Cabg      Colonoscopy N/A 2018    Procedure: COLONOSCOPY, POSSIBLE BIOPSY, POSSIBLE POLYPECTOMY 43525;  Surgeon: Guillermo Meyer MD;  Location: [facility]    Colonoscopy N/A 2021    Procedure: COLONOSCOPY;  Surgeon: Guillermo Meyer MD;  Location: [facility]    Colonoscopy      Lasik enhancement - od - right eye Bilateral     Other surgical history  01/10/2011    flex cysto, dr alvarez    Other surgical history  2011    Rt knee torn meniscus repair    Other surgical history Left 2015    Procedure: KNEE ARTHROSCOPY;  Surgeon: Eduardo Hidalgo MD;  Location:  MAIN OR    Other surgical history  2018    robotic right hemicolectomy    Other surgical history      lef t meniscus repair knee   [6]   Family History  Problem Relation Age of Onset    Hypertension Mother     Lipids Mother     Heart Disorder Mother         heavy smoker    Other (PVD) Mother     Diabetes Maternal Grandmother     Hypertension Maternal Grandmother     Lipids Maternal Grandmother     Obesity Maternal Grandmother     Heart Disorder Maternal Grandmother         MI  60's    Cancer Paternal Grandmother         colon cancer 50's, 92    Cancer Paternal Grandfather         bladder ca 60's    Cancer Father         Bladder cancer,  late 60'd    Hypertension Brother     Ovarian Cancer Paternal Cousin Female         under 50

## 2025-04-09 NOTE — DISCHARGE INSTRUCTIONS
Home Care Instructions for Colonoscopy with Sedation    Diet:  - Resume your regular diet as tolerated unless otherwise instructed.  - Start with light meals to minimize bloating.  - Do not drink alcohol today.    Medication:  - If you have questions about resuming your normal medications, please contact your Primary Care Physician.    Activities:  - Take it easy today. Do not return to work today.  - Do not drive today.  - Do not operate any machinery today (including kitchen equipment).    Colonoscopy:  - You may notice some rectal \"spotting\" (a little blood on the toilet tissue) for a day or two after the exam. This is normal.  - If you experience any rectal bleeding (not spotting), persistent tenderness or sharp severe abdominal pains, oral temperature over 100 degrees Fahrenheit, light-headedness or dizziness, or any other problems, contact your doctor.    **If unable to reach your doctor, please go to the [facility] Emergency Room**    - Your referring physician will receive a full report of your examination.  - If you do not hear from your doctor's office within two weeks of your biopsy, please call them for your results.    You may be able to see your laboratory results in [facility] between 4 and 7 business days.  In some cases, your physician may not have viewed the results before they are released to [facility].  If you have questions regarding your results contact the physician who ordered the test/exam by phone or via [facility] by choosing \"Ask a Medical Question.\"

## 2025-04-09 NOTE — OPERATIVE REPORT
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 After discussions of the risks/benefits and alternatives to this procedure, as well as the planned sedation, the patient was placed in the left lateral decubitus position and begun on continuous blood pressure pulse oximetry and EKG monitoring and this was maintained throughout the procedure. Once an adequate level of sedation was obtained a digital rectal exam was completed. Then the lubricated tip of the [id number] diagnostic video colonoscope was inserted and advanced without difficulty to the cecum using the CO2 insufflation technique. The ileocolonic anastomosis was identified. Withdrawal was begun with thorough washing and careful examination of the colonic walls and folds. A routine second examination of the cecum/ascending colon was performed. Photodocumentation was obtained. The bowel prep was good. Views of the colon were good with washing. I then carefully withdrew the instrument from the patient who tolerated the procedure well.     Complications: none.    Findings:   1. 1 polyps noted as follows:      A. 3 mm polyp in the transverse colon; sessile morphology; completely removed by cold forceps biopsies and retrieved.    2. Diverticulosis: none.    3. Terminal ileum: the visualized mucosa appeared unremarkable.    4. A retroflexed view of  the rectum revealed small internal hemorrhoids .    5. The colonic mucosa throughout the colon was carefully examined and showed normal vascular pattern, without evidence of angioectasias or inflammation. The ileocolonic anastomosis appeared unremarkable.     6. LEROY: normal rectal tone, no masses palpated.     Impression:   One small colon polyp, resected and retrieved.   Small internal hemorrhoids.   Healthy and patent ileo-colonic anastomosis.      Recommend:  Await pathology. The interval for the next colonoscopy will be determined after reviewing pathology. (5-10 years) If new signs or symptoms develop, colonoscopy may need to be repeated sooner.   High fiber diet.  Monitor for blood in the stool. If having more than just tinge of blood, call office or go to the ER.    >>>If tissue was obtained and you have not received your pathology results either by phone or letter within 2 weeks, please call our office at [phone].    Specimens: transverse colon polyp    Blood loss: <1 ml

## 2025-04-09 NOTE — ANESTHESIA PREPROCEDURE EVALUATION
Anesthesia PreOp Note    HPI:     Olimpia Medina is a 60 year old female who presents for preoperative consultation requested by: Delilah Armenta MD    Date of Surgery: 4/9/2025    Procedure(s):  COLONOSCOPY  Indication: Colon cancer screening   Low grade mucinous neoplasm of appendix    Relevant Problems   No relevant active problems       NPO:  Last Liquid Consumption Date: 04/09/25  Last Liquid Consumption Time: 0900  Last Solid Consumption Date: 04/07/25  Last Solid Consumption Time: 2000  Last Liquid Consumption Date: 04/09/25          History Review:  Patient Active Problem List    Diagnosis Date Noted    Thyroid nodule 03/06/2025    CAD (coronary artery disease) 01/08/2025    Hyperlipidemia 11/05/2024    Sensation of plugged ear on right side 11/05/2024    Impacted cerumen of right ear 11/05/2024    Elevated coronary artery calcium score 11/05/2024    Encounter for screening mammogram for malignant neoplasm of breast 06/26/2024    Essential hypertension 06/07/2021    Liver cyst 06/07/2021    Vitamin D deficiency 06/07/2021    Mass of left forearm 08/07/2019    Lipoma of left upper extremity 07/10/2019    Hepatic steatosis 07/10/2019    Microscopic hematuria 07/10/2019    Obesity (BMI 30-39.9) 07/10/2019    Mixed hyperlipidemia 07/10/2019    Preoperative testing 08/23/2018    Low grade mucinous neoplasm of appendix 08/06/2018       Past Medical History[1]    Past Surgical History[2]    Prescriptions Prior to Admission[3]  Current Medications and Prescriptions Ordered in Epic[4]    Allergies[5]    Family History[6]  Social Hx on file[7]    Available pre-op labs reviewed.  Lab Results   Component Value Date    WBC 5.6 03/15/2025    RBC 5.06 03/15/2025    HGB 14.9 03/15/2025    HCT 42.8 03/15/2025    MCV 84.6 03/15/2025    MCH 29.4 03/15/2025    MCHC 34.8 03/15/2025    RDW 11.9 03/15/2025    .0 03/15/2025     Lab Results   Component Value Date     03/15/2025    K 4.3 03/15/2025      03/15/2025    CO2 25.0 03/15/2025    BUN 18 03/15/2025    CREATSERUM 1.10 (H) 03/15/2025    GLU 96 03/15/2025    PGLU 95 01/12/2025    CA 9.3 03/15/2025          Vital Signs:  Body mass index is 32.19 kg/m².   height is 1.753 m (5' 9\") and weight is 98.9 kg (218 lb). Her blood pressure is 129/64 and her pulse is 58. Her respiration is 15 and oxygen saturation is 97%.   Vitals:    04/01/25 1316 04/09/25 0957   BP:  129/64   Pulse:  58   Resp:  15   SpO2:  97%   Weight: 98.9 kg (218 lb)    Height: 1.753 m (5' 9\")         Anesthesia Evaluation      Airway   Mallampati: III  TM distance: >3 FB  Dental - Dentition appears grossly intact     Pulmonary - negative ROS and normal exam   Cardiovascular - normal exam  Exercise tolerance: good  (+) hypertension, CAD    Rate: normal    Neuro/Psych - negative ROS     GI/Hepatic/Renal    (+) liver disease    Endo/Other    Abdominal  - normal exam     Other findings: Open Heart Surgery            Anesthesia Plan:   ASA:  3  Plan:   MAC  Informed Consent Plan and Risks Discussed With:  Patient      I have informed Ana María Adam and/or legal guardian or family member of the nature of the anesthetic plan, benefits, risks including possible dental damage if relevant, major complications, and any alternative forms of anesthetic management.   All of the patient's questions were answered to the best of my ability. The patient desires the anesthetic management as planned.  Guero Aguilar CRNA  4/9/2025 10:08 AM  Present on Admission:  **None**           [1]   Past Medical History:   Arrhythmia    Hx of irregular heartbeat, cardiac workup completed 2016, negative per cardiologist Dr. Joey Orantes, no treatment    Back problem    Neck pain, into left hand with numbness and tingling    Coronary atherosclerosis    DEPRESSION    Disorder of thyroid    HERPES SIMPLEX    shingles on right upper arm, genital, not oral    High blood pressure    High cholesterol    Hx of motion sickness     HYPERLIPIDEMIA    Low grade mucinous neoplasm of appendix    Migraines    Blurred Vision r/t headache    Migraines    NEUROCARDIOGENIC DISEASE    Other and unspecified hyperlipidemia    Thyroid disease   [2]   Past Surgical History:  Procedure Laterality Date    Appendectomy  07/10/2018    Laparoscopic Cecectomy, Drainage of Appendiceal abcess    Appendectomy      Cabg      Colonoscopy N/A 2018    Procedure: COLONOSCOPY, POSSIBLE BIOPSY, POSSIBLE POLYPECTOMY 20889;  Surgeon: Guillermo Meyer MD;  Location: [facility]    Colonoscopy N/A 2021    Procedure: COLONOSCOPY;  Surgeon: Guillermo Meyer MD;  Location: [facility]    Colonoscopy      Lasik enhancement - od - right eye Bilateral     Other surgical history  01/10/2011    flex cysto, dr alvarez    Other surgical history  2011    Rt knee torn meniscus repair    Other surgical history Left 2015    Procedure: KNEE ARTHROSCOPY;  Surgeon: Eduardo Hidalgo MD;  Location:  MAIN OR    Other surgical history  2018    robotic right hemicolectomy    Other surgical history      lef t meniscus repair knee   [3]   Medications Prior to Admission   Medication Sig Dispense Refill Last Dose/Taking    ERGOCALCIFEROL 1.25 MG (96699 UT) Oral Cap Take 1 capsule (50,000 Units total) by mouth once a week. 12 capsule 0 Taking    FUROSEMIDE 20 MG Oral Tab TAKE ONE TABLET BY MOUTH DAILY AS NEEDED 30 tablet 0 2025    [] topiramate 25 MG Oral Tab Take 1 tablet (25 mg total) by mouth 2 (two) times daily. 60 tablet 2 2025    ketoconazole 2 % External Cream Apply 1 Application topically daily. (Patient taking differently: Apply 1 Application topically daily as needed.) 60 g 3 Taking Differently    [] Tirzepatide-Weight Management (ZEPBOUND) 2.5 MG/0.5ML Subcutaneous Solution Auto-injector Inject 2.5 mg into the skin once a week for 4 doses. 2 mL 0 2025    metoprolol tartrate 25 MG Oral Tab Take 1 tablet (25  mg total) by mouth 2x Daily(Beta Blocker). 60 tablet 0 2025    ascorbic acid 500 MG Oral Tab Take 1 tablet (500 mg total) by mouth 2 (two) times daily. 60 tablet 0 Taking    clopidogrel (PLAVIX) 75 MG Oral Tab Take 1 tablet (75 mg total) by mouth daily. 90 tablet 0 2025    multivitamin Oral Tab Take 1 tablet by mouth daily. 90 tablet 0 Taking    aspirin 81 MG Oral Tab EC Take 1 tablet (81 mg total) by mouth in the morning.   2025    levothyroxine 25 MCG Oral Tab Take 1 tablet (25 mcg total) by mouth before breakfast.   2025    [] rosuvastatin 40 MG Oral Tab Take 1 tablet (40 mg total) by mouth nightly. 90 tablet 1 2025    [] Doxycycline Hyclate 100 MG Oral Tab Take 1 tablet (100 mg total) by mouth 2 (two) times daily for 10 days. 20 tablet 0     ciprofloxacin (CIPRO) 500 MG Oral Tab Take 1 tablet (500 mg total) by mouth 2 (two) times daily. (Patient not taking: Reported on 2025) 14 tablet 0 Not Taking    Potassium Chloride ER 10 MEQ Oral Tab CR Take 1 tablet (10 mEq total) by mouth daily. (Patient not taking: Reported on 3/6/2025) 30 tablet 1     Tirzepatide-Weight Management (ZEPBOUND) 5 MG/0.5ML Subcutaneous Solution Inject 5 mg into the skin once a week. (Patient not taking: Reported on 3/6/2025) 6 mL 0     [] Tirzepatide-Weight Management (ZEPBOUND) 5 MG/0.5ML Subcutaneous Solution Auto-injector Inject 5 mg into the skin once a week for 4 doses. 2 mL 0    [4]   Current Facility-Administered Medications Ordered in Epic   Medication Dose Route Frequency Provider Last Rate Last Admin    lactated ringers infusion   Intravenous Continuous Delilah Armenta MD         No current Psychiatric-ordered outpatient medications on file.   [5]   Allergies  Allergen Reactions    Pcns [Penicillins] HIVES     Patient has been on meropenem recently  Hives, age 24. Has had no exposure since.   [6]   Family History  Problem Relation Age of Onset    Hypertension Mother     Lipids Mother     Heart  Disorder Mother         heavy smoker    Other (PVD) Mother     Diabetes Maternal Grandmother     Hypertension Maternal Grandmother     Lipids Maternal Grandmother     Obesity Maternal Grandmother     Heart Disorder Maternal Grandmother         MI  60's    Cancer Paternal Grandmother         colon cancer 50's, 92    Cancer Paternal Grandfather         bladder ca 60's    Cancer Father         Bladder cancer,  late 60'd    Hypertension Brother     Ovarian Cancer Paternal Cousin Female         under 50   [7]   Social History  Socioeconomic History    Marital status:     Number of children: 2   Tobacco Use    Smoking status: Never    Smokeless tobacco: Never   Vaping Use    Vaping status: Never Used   Substance and Sexual Activity    Alcohol use: Yes    Drug use: No    Sexual activity: Yes     Partners: Male     Birth control/protection: Vasectomy   Other Topics Concern    Caffeine Concern No    Seat Belt Yes    History of tanning Yes    Reaction to local anesthetic No    Pt has a pacemaker No    Pt has a defibrillator No

## 2025-04-14 ENCOUNTER — LAB ENCOUNTER (OUTPATIENT)
Dept: LAB | Facility: HOSPITAL | Age: 60
End: 2025-04-14
Attending: INTERNAL MEDICINE
Payer: COMMERCIAL

## 2025-04-14 ENCOUNTER — CARDPULM VISIT (OUTPATIENT)
Dept: CARDIAC REHAB | Facility: HOSPITAL | Age: 60
End: 2025-04-14
Attending: INTERNAL MEDICINE
Payer: COMMERCIAL

## 2025-04-14 DIAGNOSIS — E78.5 HYPERLIPIDEMIA: Primary | ICD-10-CM

## 2025-04-14 LAB
CHOLEST SERPL-MCNC: 141 MG/DL (ref ?–200)
FASTING PATIENT LIPID ANSWER: YES
HDLC SERPL-MCNC: 47 MG/DL (ref 40–59)
LDLC SERPL CALC-MCNC: 74 MG/DL (ref ?–100)
NONHDLC SERPL-MCNC: 94 MG/DL (ref ?–130)
TRIGL SERPL-MCNC: 110 MG/DL (ref 30–149)
VLDLC SERPL CALC-MCNC: 17 MG/DL (ref 0–30)

## 2025-04-14 PROCEDURE — 36415 COLL VENOUS BLD VENIPUNCTURE: CPT

## 2025-04-14 PROCEDURE — 80061 LIPID PANEL: CPT

## 2025-04-14 PROCEDURE — 93798 PHYS/QHP OP CAR RHAB W/ECG: CPT

## 2025-04-16 ENCOUNTER — CARDPULM VISIT (OUTPATIENT)
Dept: CARDIAC REHAB | Facility: HOSPITAL | Age: 60
End: 2025-04-16
Attending: INTERNAL MEDICINE
Payer: COMMERCIAL

## 2025-04-16 PROCEDURE — 93798 PHYS/QHP OP CAR RHAB W/ECG: CPT

## 2025-04-17 ENCOUNTER — CARDPULM VISIT (OUTPATIENT)
Dept: CARDIAC REHAB | Facility: HOSPITAL | Age: 60
End: 2025-04-17
Attending: INTERNAL MEDICINE
Payer: COMMERCIAL

## 2025-04-17 PROCEDURE — 93798 PHYS/QHP OP CAR RHAB W/ECG: CPT

## 2025-04-21 ENCOUNTER — CARDPULM VISIT (OUTPATIENT)
Dept: CARDIAC REHAB | Facility: HOSPITAL | Age: 60
End: 2025-04-21
Attending: INTERNAL MEDICINE
Payer: COMMERCIAL

## 2025-04-21 PROCEDURE — 93798 PHYS/QHP OP CAR RHAB W/ECG: CPT

## 2025-04-23 ENCOUNTER — CARDPULM VISIT (OUTPATIENT)
Dept: CARDIAC REHAB | Facility: HOSPITAL | Age: 60
End: 2025-04-23
Attending: INTERNAL MEDICINE
Payer: COMMERCIAL

## 2025-04-23 PROCEDURE — 93798 PHYS/QHP OP CAR RHAB W/ECG: CPT

## 2025-04-24 ENCOUNTER — CARDPULM VISIT (OUTPATIENT)
Dept: CARDIAC REHAB | Facility: HOSPITAL | Age: 60
End: 2025-04-24
Attending: INTERNAL MEDICINE
Payer: COMMERCIAL

## 2025-04-24 PROCEDURE — 93798 PHYS/QHP OP CAR RHAB W/ECG: CPT

## 2025-04-28 ENCOUNTER — CARDPULM VISIT (OUTPATIENT)
Dept: CARDIAC REHAB | Facility: HOSPITAL | Age: 60
End: 2025-04-28
Attending: INTERNAL MEDICINE
Payer: COMMERCIAL

## 2025-04-28 PROCEDURE — 93798 PHYS/QHP OP CAR RHAB W/ECG: CPT

## 2025-04-28 RX ORDER — ROSUVASTATIN CALCIUM 40 MG/1
40 TABLET, COATED ORAL NIGHTLY
Qty: 90 TABLET | Refills: 0 | Status: SHIPPED | OUTPATIENT
Start: 2025-04-28 | End: 2025-07-27

## 2025-04-30 ENCOUNTER — CARDPULM VISIT (OUTPATIENT)
Dept: CARDIAC REHAB | Facility: HOSPITAL | Age: 60
End: 2025-04-30
Attending: INTERNAL MEDICINE
Payer: COMMERCIAL

## 2025-04-30 PROCEDURE — 93798 PHYS/QHP OP CAR RHAB W/ECG: CPT

## 2025-05-01 ENCOUNTER — CARDPULM VISIT (OUTPATIENT)
Dept: CARDIAC REHAB | Facility: HOSPITAL | Age: 60
End: 2025-05-01
Attending: INTERNAL MEDICINE
Payer: COMMERCIAL

## 2025-05-01 PROCEDURE — 93798 PHYS/QHP OP CAR RHAB W/ECG: CPT

## 2025-05-03 ENCOUNTER — LAB ENCOUNTER (OUTPATIENT)
Dept: LAB | Facility: HOSPITAL | Age: 60
End: 2025-05-03
Attending: INTERNAL MEDICINE
Payer: COMMERCIAL

## 2025-05-03 DIAGNOSIS — H93.8X1 SENSATION OF PLUGGED EAR ON RIGHT SIDE: ICD-10-CM

## 2025-05-03 DIAGNOSIS — E78.5 HYPERLIPIDEMIA, UNSPECIFIED HYPERLIPIDEMIA TYPE: ICD-10-CM

## 2025-05-03 DIAGNOSIS — K76.0 HEPATIC STEATOSIS: ICD-10-CM

## 2025-05-03 DIAGNOSIS — E66.9 OBESITY (BMI 30-39.9): ICD-10-CM

## 2025-05-03 DIAGNOSIS — R93.1 ELEVATED CORONARY ARTERY CALCIUM SCORE: ICD-10-CM

## 2025-05-03 DIAGNOSIS — H61.21 IMPACTED CERUMEN OF RIGHT EAR: ICD-10-CM

## 2025-05-03 LAB
ALBUMIN SERPL-MCNC: 4.6 G/DL (ref 3.2–4.8)
ALP LIVER SERPL-CCNC: 53 U/L (ref 46–118)
ALT SERPL-CCNC: 46 U/L (ref 10–49)
AST SERPL-CCNC: 29 U/L (ref ?–34)
BILIRUB DIRECT SERPL-MCNC: 0.2 MG/DL (ref ?–0.3)
BILIRUB SERPL-MCNC: 0.8 MG/DL (ref 0.3–1.2)
CHOLEST SERPL-MCNC: 173 MG/DL (ref ?–200)
FASTING PATIENT LIPID ANSWER: YES
HDLC SERPL-MCNC: 46 MG/DL (ref 40–59)
LDLC SERPL CALC-MCNC: 104 MG/DL (ref ?–100)
NONHDLC SERPL-MCNC: 127 MG/DL (ref ?–130)
PROT SERPL-MCNC: 7.1 G/DL (ref 5.7–8.2)
TRIGL SERPL-MCNC: 129 MG/DL (ref 30–149)
TSI SER-ACNC: 3.31 UIU/ML (ref 0.55–4.78)
VLDLC SERPL CALC-MCNC: 22 MG/DL (ref 0–30)

## 2025-05-03 PROCEDURE — 36415 COLL VENOUS BLD VENIPUNCTURE: CPT

## 2025-05-03 PROCEDURE — 80061 LIPID PANEL: CPT

## 2025-05-03 PROCEDURE — 80076 HEPATIC FUNCTION PANEL: CPT

## 2025-05-03 PROCEDURE — 84443 ASSAY THYROID STIM HORMONE: CPT

## 2025-05-05 ENCOUNTER — OFFICE VISIT (OUTPATIENT)
Dept: INTERNAL MEDICINE CLINIC | Facility: CLINIC | Age: 60
End: 2025-05-05
Payer: COMMERCIAL

## 2025-05-05 ENCOUNTER — CARDPULM VISIT (OUTPATIENT)
Dept: CARDIAC REHAB | Facility: HOSPITAL | Age: 60
End: 2025-05-05
Attending: INTERNAL MEDICINE
Payer: COMMERCIAL

## 2025-05-05 VITALS
WEIGHT: 211 LBS | BODY MASS INDEX: 31.25 KG/M2 | HEART RATE: 45 BPM | OXYGEN SATURATION: 97 % | DIASTOLIC BLOOD PRESSURE: 68 MMHG | SYSTOLIC BLOOD PRESSURE: 130 MMHG | HEIGHT: 69 IN

## 2025-05-05 DIAGNOSIS — E78.5 HYPERLIPIDEMIA, UNSPECIFIED HYPERLIPIDEMIA TYPE: ICD-10-CM

## 2025-05-05 DIAGNOSIS — R00.1 BRADYCARDIA: ICD-10-CM

## 2025-05-05 DIAGNOSIS — E66.9 OBESITY (BMI 30-39.9): ICD-10-CM

## 2025-05-05 DIAGNOSIS — Z00.00 ROUTINE GENERAL MEDICAL EXAMINATION AT A HEALTH CARE FACILITY: ICD-10-CM

## 2025-05-05 DIAGNOSIS — I25.810 CORONARY ARTERY DISEASE INVOLVING CORONARY BYPASS GRAFT OF NATIVE HEART, UNSPECIFIED WHETHER ANGINA PRESENT: Primary | ICD-10-CM

## 2025-05-05 PROCEDURE — 93798 PHYS/QHP OP CAR RHAB W/ECG: CPT

## 2025-05-05 NOTE — PROGRESS NOTES
Olimpia Medina is a 60 year old female.    Chief complaint:weight loss follow up , medication refill, therapeutic drug monitoring    HPI:   OLIMPIA here for follow up on weight loss   Wt Readings from Last 12 Encounters:   05/05/25 211 lb (95.7 kg)   04/01/25 218 lb (98.9 kg)   03/06/25 225 lb 12.8 oz (102.4 kg)   02/11/25 229 lb 6.4 oz (104.1 kg)   01/12/25 226 lb 13.7 oz (102.9 kg)   10/28/24 239 lb (108.4 kg)   11/05/24 241 lb 6.4 oz (109.5 kg)   06/26/24 234 lb 3.2 oz (106.2 kg)   07/12/21 218 lb 6.4 oz (99.1 kg)   06/28/21 213 lb (96.6 kg)   06/16/21 221 lb (100.2 kg)   06/07/21 221 lb (100.2 kg)     Starting weight: 241 lbs   Total weight loss: 30 lbs   Medication: zepbound 2.5 mg and topamax       Typical diet   Breakfast: Lunch: Dinner: Snacks:   [employer] breakfast bars    Chicken and veggies   [place]: [employer] [place]    [place] and veggies    Doesn't sn   Cut down on the take out   Doesn't count carbs and protein    Soda/ juice/ alcohol: alcohol wine not very often     Water intake: enough   Exercise: cardiac rehab     Challenges: sugar craving     Side effect of medication: no   Score to self ( how well she is doing ):6/10   CAD   Going through cardiac rehab   Low HR today       Had blood work done recently       Current Medications[1]   Past Medical History[2]  Past Surgical History[3]     Social History:  Short Social Hx on File[4]   Family History[5]  Problem List[6]            REVIEW OF SYSTEMS:   A comprehensive 10 point review of systems was completed.  Pertinent positives and negatives noted in the the HPI          PHYSICAL EXAM:   /68   Pulse (!) 44   Ht 5' 9\" (1.753 m)   Wt 211 lb (95.7 kg)   LMP 01/15/2016 (Approximate)   SpO2 97%   BMI 31.16 kg/m²   GENERAL: well developed, well nourished,in no apparent distress  LUNGS: clear to auscultation  CARDIO: RRR without murmur  NEURO: no gross deficits              No orders of the defined types were placed in this  encounter.        ASSESSMENT/PLAN:       ICD-10-CM    1. Coronary artery disease involving coronary bypass graft of native heart, unspecified whether angina present  I25.810 Lipid Panel [E]     CBC With Differential With Platelet     Comp Metabolic Panel (14) [E]      2. Routine general medical examination at a health care facility  Z00.00 Lipid Panel [E]     CBC With Differential With Platelet     Comp Metabolic Panel (14) [E]      3. Obesity (BMI 30-39.9)  E66.9 Lipid Panel [E]     CBC With Differential With Platelet     Comp Metabolic Panel (14) [E]      4. Bradycardia  R00.1 Lipid Panel [E]     CBC With Differential With Platelet     Comp Metabolic Panel (14) [E]      5. Hyperlipidemia, unspecified hyperlipidemia type  E78.5          Plan:  Patient has lost 7 lbs # since LOV. Patient has lost a total weight loss of 30 lbs # since first weight loss consult.  Labs reviewed  Advised to continue with low carb diet   Goal is less than 100 g per day   Advised to read nutrition labels  Log in carbs if possible   Increase protein intake   exercise goal is 1 hour 3 times per week cardio and strength training   discussed challenges with weight loss   Weigh once a week at least   Avoid sugary drinks or artificially sweetened drinks  Water intake goal is 64 oz per day   Goal weight loss is 11 lbs in 3 months   She is exceeding her weight loss goal   Continue current medications   Communicated with the cardiologist Dr Mallory regarding her HR   Discussed blood work she had done recently LDL Is above goal but discussed can be transiet due to weight loss and will usually stabilize 3-6 months   Continue crestor   If not better in 3 months to add another medication       Plan:  Nutrition: low carb diet   Referral RD/nutritionist : no  Behavior:  Motivational interviewing performed      Discussed strategies to overcome habits/challenges       Reviewed:  Nutrition and the importance of regular protein intake  Labs ordered:  no  Treatment plan   Please return to the clinic if you are having persistent or worsening symptoms   Tori Samuels MD,   Diplomate of the American Board of Internal Medicine  Diplomate of the American Board of Obesity Medicine            [1]   Current Outpatient Medications   Medication Sig Dispense Refill    ROSUVASTATIN 40 MG Oral Tab Take 1 tablet (40 mg total) by mouth nightly. 90 tablet 0    ERGOCALCIFEROL 1.25 MG (10887 UT) Oral Cap Take 1 capsule (50,000 Units total) by mouth once a week. 12 capsule 0    FUROSEMIDE 20 MG Oral Tab TAKE ONE TABLET BY MOUTH DAILY AS NEEDED 30 tablet 0    ketoconazole 2 % External Cream Apply 1 Application topically daily. 60 g 3    metoprolol tartrate 25 MG Oral Tab Take 1 tablet (25 mg total) by mouth 2x Daily(Beta Blocker). 60 tablet 0    ascorbic acid 500 MG Oral Tab Take 1 tablet (500 mg total) by mouth 2 (two) times daily. 60 tablet 0    multivitamin Oral Tab Take 1 tablet by mouth daily. 90 tablet 0    aspirin 81 MG Oral Tab EC Take 1 tablet (81 mg total) by mouth in the morning.      levothyroxine 25 MCG Oral Tab Take 1 tablet (25 mcg total) by mouth before breakfast.      ciprofloxacin (CIPRO) 500 MG Oral Tab Take 1 tablet (500 mg total) by mouth 2 (two) times daily. (Patient not taking: Reported on 5/5/2025) 14 tablet 0    Potassium Chloride ER 10 MEQ Oral Tab CR Take 1 tablet (10 mEq total) by mouth daily. (Patient not taking: Reported on 5/5/2025) 30 tablet 1    Tirzepatide-Weight Management (ZEPBOUND) 5 MG/0.5ML Subcutaneous Solution Inject 5 mg into the skin once a week. (Patient not taking: Reported on 5/5/2025) 6 mL 0   [2]   Past Medical History:   Arrhythmia    Hx of irregular heartbeat, cardiac workup completed 2016, negative per cardiologist Dr. Joey Orantes, no treatment    Back problem    Neck pain, into left hand with numbness and tingling    Coronary atherosclerosis    DEPRESSION    Disorder of thyroid    HERPES SIMPLEX    shingles on right upper arm,  genital, not oral    High blood pressure    High cholesterol    Hx of motion sickness    HYPERLIPIDEMIA    Low grade mucinous neoplasm of appendix    Migraines    Blurred Vision r/t headache    Migraines    NEUROCARDIOGENIC DISEASE    Other and unspecified hyperlipidemia    Thyroid disease   [3]   Past Surgical History:  Procedure Laterality Date    Appendectomy  07/10/2018    Laparoscopic Cecectomy, Drainage of Appendiceal abcess    Appendectomy      Cabg      Colonoscopy N/A 08/13/2018    Procedure: COLONOSCOPY, POSSIBLE BIOPSY, POSSIBLE POLYPECTOMY 57356;  Surgeon: Guillermo Meyer MD;  Location: [facility]    Colonoscopy N/A 06/28/2021    Procedure: COLONOSCOPY;  Surgeon: Guillermo Meyer MD;  Location: [facility]    Colonoscopy      Colonoscopy N/A 4/9/2025    Procedure: COLONOSCOPY;  Surgeon: Delilah Armenta MD;  Location: [facility] ENDOSCOPY    Lasik enhancement - od - right eye Bilateral     Other surgical history  01/10/2011    flex cysto, dr alvarez    Other surgical history  11/2011    Rt knee torn meniscus repair    Other surgical history Left 03/13/2015    Procedure: KNEE ARTHROSCOPY;  Surgeon: Eduardo Hidalgo MD;  Location:  [facility] OR    Other surgical history  08/23/2018    robotic right hemicolectomy    Other surgical history      lef t meniscus repair knee   [4]   Social History  Socioeconomic History    Marital status:     Number of children: 2   Tobacco Use    Smoking status: Never    Smokeless tobacco: Never   Vaping Use    Vaping status: Never Used   Substance and Sexual Activity    Alcohol use: Yes    Drug use: No    Sexual activity: Yes     Partners: Male     Birth control/protection: Vasectomy   Other Topics Concern    Caffeine Concern No    Seat Belt Yes    History of tanning Yes    Reaction to local anesthetic No    Pt has a pacemaker No    Pt has a defibrillator No     Social Drivers of Health     Food Insecurity: No Food Insecurity (1/8/2025)    Food  Insecurity     Food Insecurity: Never true   Transportation Needs: No Transportation Needs (2025)    Transportation Needs     Lack of Transportation: No   Housing Stability: Low Risk  (2025)    Housing Stability     Housing Instability: No   [5]   Family History  Problem Relation Age of Onset    Hypertension Mother     Lipids Mother     Heart Disorder Mother         heavy smoker    Other (PVD) Mother     Diabetes Maternal Grandmother     Hypertension Maternal Grandmother     Lipids Maternal Grandmother     Obesity Maternal Grandmother     Heart Disorder Maternal Grandmother         MI  60's    Cancer Paternal Grandmother         colon cancer 50's, 92    Cancer Paternal Grandfather         bladder ca 60's    Cancer Father         Bladder cancer,  late 60'd    Hypertension Brother     Ovarian Cancer Paternal Cousin Female         under 50   [6]   Patient Active Problem List  Diagnosis    Low grade mucinous neoplasm of appendix    Preoperative testing    Lipoma of left upper extremity    Hepatic steatosis    Microscopic hematuria    Obesity (BMI 30-39.9)    Mixed hyperlipidemia    Mass of left forearm    Essential hypertension    Liver cyst    Vitamin D deficiency    Encounter for screening mammogram for malignant neoplasm of breast    Hyperlipidemia    Sensation of plugged ear on right side    Impacted cerumen of right ear    Elevated coronary artery calcium score    CAD (coronary artery disease)    Thyroid nodule    Polyp of colon

## 2025-05-07 ENCOUNTER — CARDPULM VISIT (OUTPATIENT)
Dept: CARDIAC REHAB | Facility: HOSPITAL | Age: 60
End: 2025-05-07
Attending: INTERNAL MEDICINE
Payer: COMMERCIAL

## 2025-05-07 PROCEDURE — 93798 PHYS/QHP OP CAR RHAB W/ECG: CPT

## 2025-05-08 ENCOUNTER — CARDPULM VISIT (OUTPATIENT)
Dept: CARDIAC REHAB | Facility: HOSPITAL | Age: 60
End: 2025-05-08
Attending: INTERNAL MEDICINE
Payer: COMMERCIAL

## 2025-05-08 PROCEDURE — 93798 PHYS/QHP OP CAR RHAB W/ECG: CPT

## 2025-05-12 ENCOUNTER — CARDPULM VISIT (OUTPATIENT)
Dept: CARDIAC REHAB | Facility: HOSPITAL | Age: 60
End: 2025-05-12
Attending: INTERNAL MEDICINE
Payer: COMMERCIAL

## 2025-05-12 PROCEDURE — 93798 PHYS/QHP OP CAR RHAB W/ECG: CPT

## 2025-05-14 ENCOUNTER — TELEPHONE (OUTPATIENT)
Dept: PODIATRY CLINIC | Facility: CLINIC | Age: 60
End: 2025-05-14

## 2025-05-21 DIAGNOSIS — D69.6 THROMBOCYTOPENIA: ICD-10-CM

## 2025-05-21 DIAGNOSIS — Z09 HOSPITAL DISCHARGE FOLLOW-UP: ICD-10-CM

## 2025-05-21 DIAGNOSIS — D64.9 ANEMIA, UNSPECIFIED TYPE: ICD-10-CM

## 2025-05-21 DIAGNOSIS — M79.89 LEG SWELLING: ICD-10-CM

## 2025-05-21 DIAGNOSIS — I25.810 CORONARY ARTERY DISEASE INVOLVING CORONARY BYPASS GRAFT OF NATIVE HEART, UNSPECIFIED WHETHER ANGINA PRESENT: ICD-10-CM

## 2025-05-21 DIAGNOSIS — D37.3 LOW GRADE MUCINOUS NEOPLASM OF APPENDIX: ICD-10-CM

## 2025-05-21 DIAGNOSIS — Z95.1 S/P CABG (CORONARY ARTERY BYPASS GRAFT): ICD-10-CM

## 2025-05-21 DIAGNOSIS — E04.1 THYROID NODULE: ICD-10-CM

## 2025-05-22 ENCOUNTER — PATIENT MESSAGE (OUTPATIENT)
Dept: INTERNAL MEDICINE CLINIC | Facility: CLINIC | Age: 60
End: 2025-05-22

## 2025-05-22 DIAGNOSIS — E66.9 OBESITY (BMI 30-39.9): Primary | ICD-10-CM

## 2025-05-22 RX ORDER — FUROSEMIDE 20 MG/1
20 TABLET ORAL
Qty: 30 TABLET | Refills: 0 | Status: SHIPPED | OUTPATIENT
Start: 2025-05-22

## 2025-05-24 RX ORDER — POTASSIUM CHLORIDE 750 MG/1
10 TABLET, EXTENDED RELEASE ORAL DAILY
Qty: 30 TABLET | Refills: 0 | Status: SHIPPED | OUTPATIENT
Start: 2025-05-24

## 2025-05-29 RX ORDER — TIRZEPATIDE 7.5 MG/.5ML
7.5 INJECTION, SOLUTION SUBCUTANEOUS WEEKLY
Qty: 6 ML | Refills: 0 | Status: SHIPPED | OUTPATIENT
Start: 2025-05-29

## 2025-05-30 DIAGNOSIS — L03.90 CELLULITIS, UNSPECIFIED CELLULITIS SITE: ICD-10-CM

## 2025-05-30 DIAGNOSIS — E66.9 OBESITY (BMI 30-39.9): ICD-10-CM

## 2025-05-30 DIAGNOSIS — I25.810 CORONARY ARTERY DISEASE INVOLVING CORONARY BYPASS GRAFT OF NATIVE HEART, UNSPECIFIED WHETHER ANGINA PRESENT: ICD-10-CM

## 2025-05-30 DIAGNOSIS — E04.1 THYROID NODULE: ICD-10-CM

## 2025-06-03 RX ORDER — TOPIRAMATE 25 MG/1
TABLET, FILM COATED ORAL
Qty: 60 TABLET | Refills: 0 | Status: SHIPPED | OUTPATIENT
Start: 2025-06-03

## 2025-06-06 ENCOUNTER — OFFICE VISIT (OUTPATIENT)
Dept: PODIATRY CLINIC | Facility: CLINIC | Age: 60
End: 2025-06-06
Payer: COMMERCIAL

## 2025-06-06 VITALS — SYSTOLIC BLOOD PRESSURE: 126 MMHG | HEART RATE: 50 BPM | DIASTOLIC BLOOD PRESSURE: 74 MMHG

## 2025-06-06 DIAGNOSIS — L60.0 INGROWN TOENAIL: ICD-10-CM

## 2025-06-06 DIAGNOSIS — L60.3 NAIL DYSTROPHY: Primary | ICD-10-CM

## 2025-06-06 PROCEDURE — 99214 OFFICE O/P EST MOD 30 MIN: CPT | Performed by: STUDENT IN AN ORGANIZED HEALTH CARE EDUCATION/TRAINING PROGRAM

## 2025-06-06 PROCEDURE — 3074F SYST BP LT 130 MM HG: CPT | Performed by: STUDENT IN AN ORGANIZED HEALTH CARE EDUCATION/TRAINING PROGRAM

## 2025-06-06 PROCEDURE — 3078F DIAST BP <80 MM HG: CPT | Performed by: STUDENT IN AN ORGANIZED HEALTH CARE EDUCATION/TRAINING PROGRAM

## 2025-06-06 NOTE — PROGRESS NOTES
Per Dr Cotto order to draw 6 ml of Lidocaine 1% for right hallux total nail avulsion with chemical matrixectomy.

## 2025-06-06 NOTE — PROGRESS NOTES
[facility]iat[facility]  Progress Note      Olimpia Medina is a 60 year old female.   Chief Complaint   Patient presents with    Ingrown Toenail     Procedure right hallux total avulsion with chemical matrixectomy. Has pain when applying pressure on it.             HPI:     Patient is a pleasant 60-year-old female presents to clinic for follow-up of right hallux ingrown toenail pain.  Admits to pain with pressure.  Patient would like to proceed with right hallux partial nail border nail avulsion with chemical matricectomy.    Allergies: Pcns [penicillins]    Current Medications[1]   Past Medical History[2]   Past Surgical History[3]   Family History[4]   Social Hx on file[5]        REVIEW OF SYSTEMS:     Denies nause, fever, chills  No calf pain  Denies chest pain or SOB      EXAM:   /74 (BP Location: Left arm, Patient Position: Sitting, Cuff Size: adult)   Pulse 50   LMP 01/15/2016 (Approximate)   GENERAL: well developed, well nourished, in no apparent distress  EXTREMITIES:   1. Integument: Normal skin temperature and turgor.  Incurvated right hallux medial lateral nail borders with no signs of infection.  2. Vascular: Dorsalis pedis two out of four bilateral and posterior tibial pulses two out of   four bilateral, capillary refill normal.   3. Musculoskeletal: All muscle groups are graded 5 out of 5 in the foot and ankle.   4. Neurological: Normal sharp dull sensation; reflexes normal.             ASSESSMENT AND PLAN:   Diagnoses and all orders for this visit:    Nail dystrophy    Ingrown toenail        Plan:       -Patient examined, chart history reviewed.  -Discussed etiology of patient's condition and various treatment options.  -Recommend  nail avulsion with matricectomy to right total  hallux nail border.  Discussed procedure in detail with patient  including benefits, risks, and recovery period. Benefits including decrease in pain and risks including but not limited to recurrence, infection,  worsening of condition and loss of toe.   Patient agreeable with procedure and written consent was obtained.    Procedure as follows:  After prepping site with alcohol, administered local infiltrative block to  right hallux consisting of 6 cc of  1% lidocaine plain.  After sufficient anesthesia was achieved, the digit was prepped with Betadine and a tourniquet was used for hemostasis.  Next, using a hemostat, the  right  nail folds were freed.     A hemostat was once again used to remove the right hallux nail border   in its entirety.  Bacitracin was applied to the skin edges of the toe for protection.  Next  3 applications of disposable phenol sticks were administered to the avulsed toenail site for 20 seconds each.    The site was then copiously irrigated with alcohol and patted dry.  The site was dressed with bacitracin, gauze, and mildly compressive Coban wrap.  The tourniquet was released at this time with CFT brisk to the digits noted.  The patient tolerated the procedure well and there were no complications.      -Leave bandage intact for 24 hrs. Starting in 24 hrs  soak foot once daily for 15-20 minutes in lukewarm water and non-scented Epsom salt. Dry toe well and dress with bacitracin/neosporin after soaks once daily . Soaks and dressings are to be performed for 10 days. After 10 days let  toe to air out and only cover with bandaid during the day.   -OTC NSAIDs for pain if needed  -Educated patient on acute signs of infection advised patient to seek immediate medical attention if any concerns arise    RTC in 5 weeks      The patient indicates understanding of these issues and agrees to the plan.        Leslie Cotto DPM        [1]   Current Outpatient Medications   Medication Sig Dispense Refill    topiramate 25 MG Oral Tab START WITH 1 TABLET BY MOUTH IN THE EVENING TIME, IF TOLERATED INCREASE TO TWICE DAILY 60 tablet 0    Tirzepatide-Weight Management (ZEPBOUND) 7.5 MG/0.5ML Subcutaneous  Solution Inject 7.5 mg into the skin once a week. 6 mL 0    Potassium Chloride ER 10 MEQ Oral Tab CR Take 1 tablet (10 mEq total) by mouth daily. 30 tablet 0    FUROSEMIDE 20 MG Oral Tab TAKE ONE TABLET BY MOUTH DAILY AS NEEDED 30 tablet 0    ROSUVASTATIN 40 MG Oral Tab Take 1 tablet (40 mg total) by mouth nightly. 90 tablet 0    ERGOCALCIFEROL 1.25 MG (12649 UT) Oral Cap Take 1 capsule (50,000 Units total) by mouth once a week. 12 capsule 0    Tirzepatide-Weight Management (ZEPBOUND) 5 MG/0.5ML Subcutaneous Solution Inject 5 mg into the skin once a week. 6 mL 0    metoprolol tartrate 25 MG Oral Tab Take 1 tablet (25 mg total) by mouth 2x Daily(Beta Blocker). 60 tablet 0    ascorbic acid 500 MG Oral Tab Take 1 tablet (500 mg total) by mouth 2 (two) times daily. 60 tablet 0    multivitamin Oral Tab Take 1 tablet by mouth daily. 90 tablet 0    aspirin 81 MG Oral Tab EC Take 1 tablet (81 mg total) by mouth in the morning.      levothyroxine 25 MCG Oral Tab Take 1 tablet (25 mcg total) by mouth before breakfast.      ketoconazole 2 % External Cream Apply 1 Application topically daily. (Patient not taking: Reported on 6/6/2025) 60 g 3   [2]   Past Medical History:   Arrhythmia    Hx of irregular heartbeat, cardiac workup completed 2016, negative per cardiologist Dr. Joey Orantes, no treatment    Back problem    Neck pain, into left hand with numbness and tingling    Coronary atherosclerosis    DEPRESSION    Disorder of thyroid    HERPES SIMPLEX    shingles on right upper arm, genital, not oral    High blood pressure    High cholesterol    Hx of motion sickness    HYPERLIPIDEMIA    Low grade mucinous neoplasm of appendix    Migraines    Blurred Vision r/t headache    Migraines    NEUROCARDIOGENIC DISEASE    Other and unspecified hyperlipidemia    Thyroid disease   [3]   Past Surgical History:  Procedure Laterality Date    Appendectomy  07/10/2018    Laparoscopic Cecectomy, Drainage of Appendiceal abcess    Appendectomy       Cabg      Colonoscopy N/A 2018    Procedure: COLONOSCOPY, POSSIBLE BIOPSY, POSSIBLE POLYPECTOMY 22218;  Surgeon: Guillermo Meyer MD;  Location: [facility]    Colonoscopy N/A 2021    Procedure: COLONOSCOPY;  Surgeon: Guillermo Meyer MD;  Location: [facility]    Colonoscopy      Colonoscopy N/A 2025    Procedure: COLONOSCOPY;  Surgeon: Delilah Armenta MD;  Location: [facility] ENDOSCOPY    Lasik enhancement - od - right eye Bilateral     Other surgical history  01/10/2011    flex cysto, dr alvarez    Other surgical history  2011    Rt knee torn meniscus repair    Other surgical history Left 2015    Procedure: KNEE ARTHROSCOPY;  Surgeon: Eduardo Hidalgo MD;  Location:  MAIN OR    Other surgical history  2018    robotic right hemicolectomy    Other surgical history      lef t meniscus repair knee   [4]   Family History  Problem Relation Age of Onset    Hypertension Mother     Lipids Mother     Heart Disorder Mother         heavy smoker    Other (PVD) Mother     Diabetes Maternal Grandmother     Hypertension Maternal Grandmother     Lipids Maternal Grandmother     Obesity Maternal Grandmother     Heart Disorder Maternal Grandmother         MI  60's    Cancer Paternal Grandmother         colon cancer 50's, 92    Cancer Paternal Grandfather         bladder ca 60's    Cancer Father         Bladder cancer,  late 60'd    Hypertension Brother     Ovarian Cancer Paternal Cousin Female         under 50   [5]   Social History  Socioeconomic History    Marital status:     Number of children: 2   Tobacco Use    Smoking status: Never    Smokeless tobacco: Never   Vaping Use    Vaping status: Never Used   Substance and Sexual Activity    Alcohol use: Yes    Drug use: No    Sexual activity: Yes     Partners: Male     Birth control/protection: Vasectomy   Other Topics Concern    Caffeine Concern No    Seat Belt Yes    History of tanning Yes    Reaction to  local anesthetic No    Pt has a pacemaker No    Pt has a defibrillator No

## 2025-06-22 DIAGNOSIS — D37.3 LOW GRADE MUCINOUS NEOPLASM OF APPENDIX: ICD-10-CM

## 2025-06-22 DIAGNOSIS — D64.9 ANEMIA, UNSPECIFIED TYPE: ICD-10-CM

## 2025-06-22 DIAGNOSIS — Z95.1 S/P CABG (CORONARY ARTERY BYPASS GRAFT): ICD-10-CM

## 2025-06-22 DIAGNOSIS — M79.89 LEG SWELLING: ICD-10-CM

## 2025-06-22 DIAGNOSIS — E04.1 THYROID NODULE: ICD-10-CM

## 2025-06-22 DIAGNOSIS — Z09 HOSPITAL DISCHARGE FOLLOW-UP: ICD-10-CM

## 2025-06-22 DIAGNOSIS — D69.6 THROMBOCYTOPENIA: ICD-10-CM

## 2025-06-22 DIAGNOSIS — I25.810 CORONARY ARTERY DISEASE INVOLVING CORONARY BYPASS GRAFT OF NATIVE HEART, UNSPECIFIED WHETHER ANGINA PRESENT: ICD-10-CM

## 2025-06-24 RX ORDER — FUROSEMIDE 20 MG/1
20 TABLET ORAL
Qty: 90 TABLET | Refills: 1 | Status: SHIPPED | OUTPATIENT
Start: 2025-06-24

## 2025-06-25 ENCOUNTER — PATIENT MESSAGE (OUTPATIENT)
Dept: INTERNAL MEDICINE CLINIC | Facility: CLINIC | Age: 60
End: 2025-06-25

## 2025-06-30 DIAGNOSIS — Z09 HOSPITAL DISCHARGE FOLLOW-UP: ICD-10-CM

## 2025-06-30 DIAGNOSIS — Z95.1 S/P CABG (CORONARY ARTERY BYPASS GRAFT): ICD-10-CM

## 2025-06-30 DIAGNOSIS — D69.6 THROMBOCYTOPENIA: ICD-10-CM

## 2025-06-30 DIAGNOSIS — D37.3 LOW GRADE MUCINOUS NEOPLASM OF APPENDIX: ICD-10-CM

## 2025-06-30 DIAGNOSIS — D64.9 ANEMIA, UNSPECIFIED TYPE: ICD-10-CM

## 2025-06-30 DIAGNOSIS — E04.1 THYROID NODULE: ICD-10-CM

## 2025-06-30 DIAGNOSIS — I25.810 CORONARY ARTERY DISEASE INVOLVING CORONARY BYPASS GRAFT OF NATIVE HEART, UNSPECIFIED WHETHER ANGINA PRESENT: ICD-10-CM

## 2025-06-30 DIAGNOSIS — M79.89 LEG SWELLING: ICD-10-CM

## 2025-07-01 RX ORDER — POTASSIUM CHLORIDE 750 MG/1
10 TABLET, EXTENDED RELEASE ORAL DAILY
Qty: 30 TABLET | Refills: 0 | Status: SHIPPED | OUTPATIENT
Start: 2025-07-01

## 2025-07-09 DIAGNOSIS — E04.1 THYROID NODULE: ICD-10-CM

## 2025-07-09 DIAGNOSIS — L03.90 CELLULITIS, UNSPECIFIED CELLULITIS SITE: ICD-10-CM

## 2025-07-09 DIAGNOSIS — I25.810 CORONARY ARTERY DISEASE INVOLVING CORONARY BYPASS GRAFT OF NATIVE HEART, UNSPECIFIED WHETHER ANGINA PRESENT: ICD-10-CM

## 2025-07-09 DIAGNOSIS — E66.9 OBESITY (BMI 30-39.9): ICD-10-CM

## 2025-07-14 RX ORDER — TOPIRAMATE 25 MG/1
TABLET, FILM COATED ORAL
Qty: 60 TABLET | Refills: 0 | Status: SHIPPED | OUTPATIENT
Start: 2025-07-14

## 2025-07-14 NOTE — TELEPHONE ENCOUNTER
Refill Per Protocol     Requested Prescriptions   Pending Prescriptions Disp Refills    TOPIRAMATE 25 MG Oral Tab [Pharmacy Med Name: Topiramate 25 Mg Tab Cipl] 60 tablet 0     Sig: START WITH 1 TABLET BY MOUTH IN THE EVENING TIME, IF TOLERATED INCREASE TO TWICE DAILY       Neurology Medications Passed - 7/14/2025 10:00 AM        Passed - In person appointment or virtual visit in the past 6 mos or appointment in next 3 mos     Recent Outpatient Visits              1 month ago Nail dystrophy    [facility]    Office Visit    2 months ago Coronary artery disease involving coronary bypass graft of native heart, unspecified whether angina present    [facility]    [facility]    4 months ago Cellulitis, unspecified cellulitis site    [facility]    Office Visit    5 months ago Ingrown toenail    [facility]    Office Visit    5 months ago Hospital discharge follow-up    [facility]    [facility] Visit          Future Appointments         Provider Department Appt Notes    In 4 days Leslie Cotto DPM [facility] [place] 5 week f/u                    Passed - Medication is active on med list

## 2025-07-16 ENCOUNTER — TELEPHONE (OUTPATIENT)
Dept: INTERNAL MEDICINE CLINIC | Facility: CLINIC | Age: 60
End: 2025-07-16

## 2025-07-16 NOTE — TELEPHONE ENCOUNTER
Received appeal letter from [facility] for Zepbound.   Reviewed appeal on 06/30/2025  Appeal for Zepbound may be delayed, still in review.   ALICJA HANNA

## 2025-07-18 ENCOUNTER — OFFICE VISIT (OUTPATIENT)
Dept: PODIATRY CLINIC | Facility: CLINIC | Age: 60
End: 2025-07-18
Payer: COMMERCIAL

## 2025-07-18 DIAGNOSIS — L60.3 NAIL DYSTROPHY: Primary | ICD-10-CM

## 2025-07-18 PROCEDURE — 99213 OFFICE O/P EST LOW 20 MIN: CPT | Performed by: STUDENT IN AN ORGANIZED HEALTH CARE EDUCATION/TRAINING PROGRAM

## 2025-07-18 NOTE — PROGRESS NOTES
[facility]  Progress Note      Olimpia Medina is a 60 year old female.   Chief Complaint   Patient presents with    Foot Pain     Right Hallux F/u - Declines pain             HPI:     Patient is a pleasant 60-year-old female presents to clinic for follow-up of right hallux total nail avulsion.  She relates that it has been healing well with no signs of infection and denies any pain today.    Allergies: Pcns [penicillins]    Current Medications[1]   Past Medical History[2]   Past Surgical History[3]   Family History[4]   Social Hx on file[5]        REVIEW OF SYSTEMS:     Denies nause, fever, chills  No calf pain  Denies chest pain or SOB      EXAM:   LMP 01/15/2016 (Approximate)   GENERAL: well developed, well nourished, in no apparent distress  EXTREMITIES:   1. Integument: Normal skin temperature and turgor.  Absent right hallux toenail with no signs of infection  2. Vascular: Dorsalis pedis two out of four bilateral and posterior tibial pulses two out of   four bilateral, capillary refill normal.   3. Musculoskeletal: All muscle groups are graded 5 out of 5 in the foot and ankle.   4. Neurological: Normal sharp dull sensation; reflexes normal.             ASSESSMENT AND PLAN:   Diagnoses and all orders for this visit:    Nail dystrophy        Plan:       Pt seen and examined. Findngs discussed with pt  Avulsion site is healing well with no acute signs of infection.  Discontinue foot soaks and dressing changes.   Educated pt on pedal hygiene.   Advised of acute signs of infection and instructed to seek immediate medical attention if symptoms arise.   RTC asneeded    The patient indicates understanding of these issues and agrees to the plan.        Leslie Cotto DPM        [1]   Current Outpatient Medications   Medication Sig Dispense Refill    topiramate 25 MG Oral Tab START WITH 1 TABLET BY MOUTH IN THE EVENING TIME, IF TOLERATED INCREASE TO TWICE DAILY 60 tablet 0    Potassium Chloride  ER 10 MEQ Oral Tab CR Take 1 tablet (10 mEq total) by mouth daily. 30 tablet 0    furosemide 20 MG Oral Tab Take 1 tablet (20 mg total) by mouth daily as needed. 90 tablet 1    Tirzepatide-Weight Management (ZEPBOUND) 7.5 MG/0.5ML Subcutaneous Solution Inject 7.5 mg into the skin once a week. 6 mL 0    ROSUVASTATIN 40 MG Oral Tab Take 1 tablet (40 mg total) by mouth nightly. 90 tablet 0    ERGOCALCIFEROL 1.25 MG (71048 UT) Oral Cap Take 1 capsule (50,000 Units total) by mouth once a week. 12 capsule 0    ketoconazole 2 % External Cream Apply 1 Application topically daily. 60 g 3    metoprolol tartrate 25 MG Oral Tab Take 1 tablet (25 mg total) by mouth 2x Daily(Beta Blocker). 60 tablet 0    ascorbic acid 500 MG Oral Tab Take 1 tablet (500 mg total) by mouth 2 (two) times daily. 60 tablet 0    multivitamin Oral Tab Take 1 tablet by mouth daily. 90 tablet 0    aspirin 81 MG Oral Tab EC Take 1 tablet (81 mg total) by mouth in the morning.      levothyroxine 25 MCG Oral Tab Take 1 tablet (25 mcg total) by mouth before breakfast.     [2]   Past Medical History:   Arrhythmia    Hx of irregular heartbeat, cardiac workup completed 2016, negative per cardiologist Dr. oJey Orantes, no treatment    Back problem    Neck pain, into left hand with numbness and tingling    Coronary atherosclerosis    DEPRESSION    Disorder of thyroid    HERPES SIMPLEX    shingles on right upper arm, genital, not oral    High blood pressure    High cholesterol    Hx of motion sickness    HYPERLIPIDEMIA    Low grade mucinous neoplasm of appendix    Migraines    Blurred Vision r/t headache    Migraines    NEUROCARDIOGENIC DISEASE    Other and unspecified hyperlipidemia    Thyroid disease   [3]   Past Surgical History:  Procedure Laterality Date    Appendectomy  07/10/2018    Laparoscopic Cecectomy, Drainage of Appendiceal abcess    Appendectomy      Cabg      Colonoscopy N/A 08/13/2018    Procedure: COLONOSCOPY, POSSIBLE BIOPSY, POSSIBLE POLYPECTOMY  12193;  Surgeon: Guillermo Meyer MD;  Location: [facility]    Colonoscopy N/A 2021    Procedure: COLONOSCOPY;  Surgeon: Guillermo Meyer MD;  Location: [facility]    Colonoscopy      Colonoscopy N/A 2025    Procedure: COLONOSCOPY;  Surgeon: Delilah Armenta MD;  Location: [facility] ENDOSCOPY    Lasik enhancement - od - right eye Bilateral     Other surgical history  01/10/2011    kade wolfo, dr alvarez    Other surgical history  2011    Rt knee torn meniscus repair    Other surgical history Left 2015    Procedure: KNEE ARTHROSCOPY;  Surgeon: Eduardo Hidalgo MD;  Location:  MAIN OR    Other surgical history  2018    robotic right hemicolectomy    Other surgical history      lef t meniscus repair knee   [4]   Family History  Problem Relation Age of Onset    Hypertension Mother     Lipids Mother     Heart Disorder Mother         heavy smoker    Other (PVD) Mother     Diabetes Maternal Grandmother     Hypertension Maternal Grandmother     Lipids Maternal Grandmother     Obesity Maternal Grandmother     Heart Disorder Maternal Grandmother         MI  60's    Cancer Paternal Grandmother         colon cancer 50's, 92    Cancer Paternal Grandfather         bladder ca 60's    Cancer Father         Bladder cancer,  late 60'd    Hypertension Brother     Ovarian Cancer Paternal Cousin Female         under 50   [5]   Social History  Socioeconomic History    Marital status:     Number of children: 2   Tobacco Use    Smoking status: Never    Smokeless tobacco: Never   Vaping Use    Vaping status: Never Used   Substance and Sexual Activity    Alcohol use: Yes    Drug use: No    Sexual activity: Yes     Partners: Male     Birth control/protection: Vasectomy   Other Topics Concern    Caffeine Concern No    Seat Belt Yes    History of tanning Yes    Reaction to local anesthetic No    Pt has a pacemaker No    Pt has a defibrillator No

## 2025-07-24 DIAGNOSIS — H93.8X1 SENSATION OF PLUGGED EAR ON RIGHT SIDE: ICD-10-CM

## 2025-07-24 DIAGNOSIS — R74.8 ELEVATED LIVER ENZYMES: ICD-10-CM

## 2025-07-24 DIAGNOSIS — K76.0 HEPATIC STEATOSIS: ICD-10-CM

## 2025-07-24 DIAGNOSIS — E78.5 HYPERLIPIDEMIA, UNSPECIFIED HYPERLIPIDEMIA TYPE: ICD-10-CM

## 2025-07-24 DIAGNOSIS — R93.1 ELEVATED CORONARY ARTERY CALCIUM SCORE: ICD-10-CM

## 2025-07-24 DIAGNOSIS — H61.21 IMPACTED CERUMEN OF RIGHT EAR: ICD-10-CM

## 2025-07-24 DIAGNOSIS — E66.9 OBESITY (BMI 30-39.9): ICD-10-CM

## 2025-07-25 RX ORDER — LEVOTHYROXINE SODIUM 25 UG/1
25 TABLET ORAL
Qty: 90 TABLET | Refills: 0 | OUTPATIENT
Start: 2025-07-25

## 2025-07-25 RX ORDER — ROSUVASTATIN CALCIUM 40 MG/1
40 TABLET, COATED ORAL NIGHTLY
Qty: 90 TABLET | Refills: 3 | Status: SHIPPED | OUTPATIENT
Start: 2025-07-25

## 2025-07-25 NOTE — TELEPHONE ENCOUNTER
Refill passed per Clinic protocol.  Requested Prescriptions   Pending Prescriptions Disp Refills    LEVOTHYROXINE 25 MCG Oral Tab [Pharmacy Med Name: Levothyroxine Sodium 25 Mcg Tab Lupi] 90 tablet 0     Sig: TAKE ONE TABLET BY MOUTH DAILY BEFORE BREAKFAST       Thyroid Medication Protocol Passed - 7/25/2025  1:17 PM        Passed - TSH in past 12 months        Passed - Last TSH value is normal     Lab Results   Component Value Date    TSH 3.313 05/03/2025                 Passed - In person appointment or virtual visit in the past 12 mos or appointment in next 3 mos     Recent Outpatient Visits              1 week ago Nail dystrophy    [facility], Leslie Kern DPM    Office Visit    1 month ago Nail dystrophy    [facility], Leslie Kern DPM    Office Visit    2 months ago Coronary artery disease involving coronary bypass graft of native heart, unspecified whether angina present    [facility], Tori Cervantes MD    Office Visit    4 months ago Cellulitis, unspecified cellulitis site    [facility]    [facility]    5 months ago Ingrown toenail    [facility], Leslie Perales DPM    Office Visit                      Passed - Medication is active on med list          ROSUVASTATIN 40 MG Oral Tab [Pharmacy Med Name: Rosuvastatin Calcium 40 Mg Tab Nort] 90 tablet 0     Sig: Take 1 tablet (40 mg total) by mouth nightly.       Cholesterol Medication Protocol Passed - 7/25/2025  1:17 PM        Passed - ALT < 80     Lab Results   Component Value Date    ALT 46 05/03/2025             Passed - ALT resulted within past year        Passed - Lipid panel within past 12 months     Lab Results   Component Value Date    CHOLEST 173 05/03/2025    TRIG 129 05/03/2025    HDL 46  05/03/2025     (H) 05/03/2025    VLDL 22 05/03/2025    NONHDLC 127 05/03/2025             Passed - In person appointment or virtual visit in the past 12 mos or appointment in next 3 mos     [facility]              [facility]    [facility]    [facility]    [facility]    2 months ago Coronary artery disease involving coronary bypass graft of native heart, unspecified whether angina present    [facility]    [facility]    4 months ago Cellulitis, unspecified cellulitis site    [facility]    [facility]    5 months ago [facility]grown toenail    [facility]    [facility]                      Passed - Medication is active on med list

## 2025-07-25 NOTE — TELEPHONE ENCOUNTER
Refill passed per Clinic protocol.    Was discontinued on 11/05/2024- patient reports taking- please advise on refill     Requested Prescriptions   Pending Prescriptions Disp Refills    levothyroxine 25 MCG Oral Tab 90 tablet 3     Sig: Take 1 tablet (25 mcg total) by mouth before breakfast.       Thyroid Medication Protocol Passed - 7/25/2025  1:21 PM        Passed - TSH in past 12 months        Passed - Last TSH value is normal     Lab Results   Component Value Date    TSH 3.313 05/03/2025                 Passed - In person appointment or virtual visit in the past 12 mos or appointment in next 3 mos     Recent Outpatient Visits              1 week ago Nail dystrophy    [facility], Leslie Kern DPM    Office Visit    1 month ago Nail dystrophy    [facility], Leslie Kern DPM    Office Visit    2 months ago Coronary artery disease involving coronary bypass graft of native heart, unspecified whether angina present    [facility] [place] Tori Cervantes MD    Office Visit    4 months ago Cellulitis, unspecified cellulitis site    [facility] [place]    Office Visit    5 months ago Ingrown toenail    [facility] [place] Leslie Perales DPM    Office Visit                      Passed - Medication is active on med list         Signed Prescriptions Disp Refills    rosuvastatin 40 MG Oral Tab 90 tablet 3     Sig: Take 1 tablet (40 mg total) by mouth nightly.       Cholesterol Medication Protocol Passed - 7/25/2025  1:21 PM        Passed - ALT < 80     Lab Results   Component Value Date    ALT 46 05/03/2025             Passed - ALT resulted within past year        Passed - Lipid panel within past 12 months     Lab Results   Component Value Date    CHOLEST 173 05/03/2025    TRIG 129 05/03/2025     HDL 46 05/03/2025     (H) 05/03/2025    VLDL 22 05/03/2025    NONHDLC 127 05/03/2025             Passed - In person appointment or virtual visit in the past 12 mos or appointment in next 3 mos     Recent Outpatient Visits              1 week ago Nail dystrophy    [facility], Leslie Kern DPM    Office Visit    1 month ago Nail dystrophy    [facility], Leslie Kern DPM    Office Visit    2 months ago Coronary artery disease involving coronary bypass graft of native heart, unspecified whether angina present    [facility], Tori Cervantes MD    Office Visit    4 months ago Cellulitis, unspecified cellulitis site    [facility]    [facility]    5 months ago [facility]grown toenail    [facility]    [facility]                      Passed - Medication is active on med list         Refused Prescriptions Disp Refills    levothyroxine 25 MCG Oral Tab [Pharmacy Med Name: Levothyroxine Sodium 25 Mcg Tab Lupi] 90 tablet 0     Sig: Take 1 tablet (25 mcg total) by mouth before breakfast.       Thyroid Medication Protocol Passed - 7/25/2025  1:21 PM        Passed - TSH in past 12 months        Passed - Last TSH value is normal     Lab Results   Component Value Date    TSH 3.313 05/03/2025                 Passed - In person appointment or virtual visit in the past 12 mos or appointment in next 3 mos     Recent Outpatient Visits              1 week ago [facility] dyst[facility]    [facility] Visit    1 month ago [facility]ail dystrophy    [facility]    Office Visit    2 months ago Coronary artery disease involving coronary bypass  graft of native heart, unspecified whether angina present    [facility]    [facility]    4 months ago Cellulitis, unspecified cellulitis site    [facility]    [facility]    5 months ago [facility]enail    [facility]    [facility]                      Passed - Medication is active on med list

## 2025-07-25 NOTE — TELEPHONE ENCOUNTER
Patient called the office.  She would like to know why her Levothyroxine medication was denied to be filled.

## 2025-07-27 RX ORDER — LEVOTHYROXINE SODIUM 25 UG/1
25 TABLET ORAL
Qty: 90 TABLET | Refills: 3 | Status: SHIPPED | OUTPATIENT
Start: 2025-07-27

## 2025-08-01 ENCOUNTER — PATIENT MESSAGE (OUTPATIENT)
Dept: INTERNAL MEDICINE CLINIC | Facility: CLINIC | Age: 60
End: 2025-08-01

## 2025-08-01 ENCOUNTER — NURSE TRIAGE (OUTPATIENT)
Dept: INTERNAL MEDICINE CLINIC | Facility: CLINIC | Age: 60
End: 2025-08-01

## 2025-08-04 ENCOUNTER — LAB ENCOUNTER (OUTPATIENT)
Dept: LAB | Facility: HOSPITAL | Age: 60
End: 2025-08-04
Attending: INTERNAL MEDICINE

## 2025-08-04 DIAGNOSIS — E66.9 OBESITY (BMI 30-39.9): ICD-10-CM

## 2025-08-04 DIAGNOSIS — I25.810 CORONARY ARTERY DISEASE INVOLVING CORONARY BYPASS GRAFT OF NATIVE HEART, UNSPECIFIED WHETHER ANGINA PRESENT: ICD-10-CM

## 2025-08-04 DIAGNOSIS — Z00.00 ROUTINE GENERAL MEDICAL EXAMINATION AT A HEALTH CARE FACILITY: ICD-10-CM

## 2025-08-04 DIAGNOSIS — R00.1 BRADYCARDIA: ICD-10-CM

## 2025-08-04 LAB
ALBUMIN SERPL-MCNC: 4.7 G/DL (ref 3.2–4.8)
ALBUMIN/GLOB SERPL: 2.2 (ref 1–2)
ALP LIVER SERPL-CCNC: 58 U/L (ref 46–118)
ALT SERPL-CCNC: 46 U/L (ref 10–49)
ANION GAP SERPL CALC-SCNC: 6 MMOL/L (ref 0–18)
AST SERPL-CCNC: 33 U/L (ref ?–34)
BASOPHILS # BLD AUTO: 0.02 X10(3) UL (ref 0–0.2)
BASOPHILS NFR BLD AUTO: 0.4 %
BILIRUB SERPL-MCNC: 0.7 MG/DL (ref 0.3–1.2)
BUN BLD-MCNC: 15 MG/DL (ref 9–23)
BUN/CREAT SERPL: 15.8 (ref 10–20)
CALCIUM BLD-MCNC: 9.5 MG/DL (ref 8.7–10.4)
CHLORIDE SERPL-SCNC: 110 MMOL/L (ref 98–112)
CHOLEST SERPL-MCNC: 137 MG/DL (ref ?–200)
CO2 SERPL-SCNC: 27 MMOL/L (ref 21–32)
CREAT BLD-MCNC: 0.95 MG/DL (ref 0.55–1.02)
DEPRECATED RDW RBC AUTO: 39.6 FL (ref 35.1–46.3)
EGFRCR SERPLBLD CKD-EPI 2021: 69 ML/MIN/1.73M2 (ref 60–?)
EOSINOPHIL # BLD AUTO: 0.09 X10(3) UL (ref 0–0.7)
EOSINOPHIL NFR BLD AUTO: 1.9 %
ERYTHROCYTE [DISTWIDTH] IN BLOOD BY AUTOMATED COUNT: 12.5 % (ref 11–15)
FASTING PATIENT LIPID ANSWER: YES
FASTING STATUS PATIENT QL REPORTED: YES
GLOBULIN PLAS-MCNC: 2.1 G/DL (ref 2–3.5)
GLUCOSE BLD-MCNC: 92 MG/DL (ref 70–99)
HCT VFR BLD AUTO: 41.9 % (ref 35–48)
HDLC SERPL-MCNC: 45 MG/DL (ref 40–59)
HGB BLD-MCNC: 14.4 G/DL (ref 12–16)
IMM GRANULOCYTES # BLD AUTO: 0.01 X10(3) UL (ref 0–1)
IMM GRANULOCYTES NFR BLD: 0.2 %
LDLC SERPL CALC-MCNC: 68 MG/DL (ref ?–100)
LYMPHOCYTES # BLD AUTO: 1.87 X10(3) UL (ref 1–4)
LYMPHOCYTES NFR BLD AUTO: 39.1 %
MCH RBC QN AUTO: 29.8 PG (ref 26–34)
MCHC RBC AUTO-ENTMCNC: 34.4 G/DL (ref 31–37)
MCV RBC AUTO: 86.6 FL (ref 80–100)
MONOCYTES # BLD AUTO: 0.4 X10(3) UL (ref 0.1–1)
MONOCYTES NFR BLD AUTO: 8.4 %
NEUTROPHILS # BLD AUTO: 2.39 X10 (3) UL (ref 1.5–7.7)
NEUTROPHILS # BLD AUTO: 2.39 X10(3) UL (ref 1.5–7.7)
NEUTROPHILS NFR BLD AUTO: 50 %
NONHDLC SERPL-MCNC: 92 MG/DL (ref ?–130)
OSMOLALITY SERPL CALC.SUM OF ELEC: 296 MOSM/KG (ref 275–295)
PLATELET # BLD AUTO: 160 10(3)UL (ref 150–450)
POTASSIUM SERPL-SCNC: 4.1 MMOL/L (ref 3.5–5.1)
PROT SERPL-MCNC: 6.8 G/DL (ref 5.7–8.2)
RBC # BLD AUTO: 4.84 X10(6)UL (ref 3.8–5.3)
SODIUM SERPL-SCNC: 143 MMOL/L (ref 136–145)
TRIGL SERPL-MCNC: 136 MG/DL (ref 30–149)
VLDLC SERPL CALC-MCNC: 21 MG/DL (ref 0–30)
WBC # BLD AUTO: 4.8 X10(3) UL (ref 4–11)

## 2025-08-04 PROCEDURE — 85025 COMPLETE CBC W/AUTO DIFF WBC: CPT

## 2025-08-04 PROCEDURE — 80061 LIPID PANEL: CPT

## 2025-08-04 PROCEDURE — 80053 COMPREHEN METABOLIC PANEL: CPT

## 2025-08-04 PROCEDURE — 36415 COLL VENOUS BLD VENIPUNCTURE: CPT

## 2025-08-06 DIAGNOSIS — E04.1 THYROID NODULE: ICD-10-CM

## 2025-08-06 DIAGNOSIS — I25.810 CORONARY ARTERY DISEASE INVOLVING CORONARY BYPASS GRAFT OF NATIVE HEART, UNSPECIFIED WHETHER ANGINA PRESENT: ICD-10-CM

## 2025-08-06 DIAGNOSIS — D64.9 ANEMIA, UNSPECIFIED TYPE: ICD-10-CM

## 2025-08-06 DIAGNOSIS — D69.6 THROMBOCYTOPENIA: ICD-10-CM

## 2025-08-06 DIAGNOSIS — M79.89 LEG SWELLING: ICD-10-CM

## 2025-08-06 DIAGNOSIS — D37.3 LOW GRADE MUCINOUS NEOPLASM OF APPENDIX: ICD-10-CM

## 2025-08-06 DIAGNOSIS — Z09 HOSPITAL DISCHARGE FOLLOW-UP: ICD-10-CM

## 2025-08-06 DIAGNOSIS — Z95.1 S/P CABG (CORONARY ARTERY BYPASS GRAFT): ICD-10-CM

## 2025-08-08 RX ORDER — POTASSIUM CHLORIDE 750 MG/1
10 TABLET, EXTENDED RELEASE ORAL DAILY
Qty: 30 TABLET | Refills: 0 | Status: SHIPPED | OUTPATIENT
Start: 2025-08-08

## 2025-08-12 DIAGNOSIS — E66.9 OBESITY (BMI 30-39.9): ICD-10-CM

## 2025-08-12 DIAGNOSIS — E04.1 THYROID NODULE: ICD-10-CM

## 2025-08-12 DIAGNOSIS — I25.810 CORONARY ARTERY DISEASE INVOLVING CORONARY BYPASS GRAFT OF NATIVE HEART, UNSPECIFIED WHETHER ANGINA PRESENT: ICD-10-CM

## 2025-08-12 DIAGNOSIS — L03.90 CELLULITIS, UNSPECIFIED CELLULITIS SITE: ICD-10-CM

## 2025-08-15 RX ORDER — TOPIRAMATE 25 MG/1
TABLET, FILM COATED ORAL
Qty: 180 TABLET | Refills: 3 | Status: SHIPPED | OUTPATIENT
Start: 2025-08-15

## 2025-08-18 DIAGNOSIS — R93.1 ELEVATED CORONARY ARTERY CALCIUM SCORE: ICD-10-CM

## 2025-08-18 DIAGNOSIS — H61.21 IMPACTED CERUMEN OF RIGHT EAR: ICD-10-CM

## 2025-08-18 DIAGNOSIS — E78.5 HYPERLIPIDEMIA, UNSPECIFIED HYPERLIPIDEMIA TYPE: ICD-10-CM

## 2025-08-18 DIAGNOSIS — E66.9 OBESITY (BMI 30-39.9): ICD-10-CM

## 2025-08-18 DIAGNOSIS — K76.0 HEPATIC STEATOSIS: ICD-10-CM

## 2025-08-18 DIAGNOSIS — H93.8X1 SENSATION OF PLUGGED EAR ON RIGHT SIDE: ICD-10-CM

## 2025-08-25 RX ORDER — ERGOCALCIFEROL 1.25 MG/1
50000 CAPSULE, LIQUID FILLED ORAL WEEKLY
Qty: 12 CAPSULE | Refills: 0 | Status: SHIPPED | OUTPATIENT
Start: 2025-08-25 | End: 2025-11-11

## (undated) DEVICE — ENDOPATH ETS-FLEX45 ARTICULATING ENDOSCOPIC LINEAR CUTTER, NO RELOAD: Brand: ENDOPATH

## (undated) DEVICE — CADIERE FORCEPS: Brand: ENDOWRIST;DAVINCI SI

## (undated) DEVICE — ENDOPATH ETS45 2.5MM RELOADS (VASCULAR/THIN): Brand: ENDOPATH

## (undated) DEVICE — KIT ENDO ORCAPOD 160/180/190

## (undated) DEVICE — OPTISITE ARTERIAL PERFUSION CANNULA: Brand: OPTISITE ARTERIAL PERFUSION CANNULA

## (undated) DEVICE — TRAY CATH FOLEY 350CC 16FR CATH BACTIGUARD SIL

## (undated) DEVICE — Device: Brand: VIRTUOSAPH PLUS WITH RADIAL INDICATION

## (undated) DEVICE — CATHETER THOR 32FR L23IN BLU RADPQ STRP THRM

## (undated) DEVICE — PUNCH AORT 4MM SS THRMPLSTC SLF ALIGNING

## (undated) DEVICE — CLIP LIG M BLU TI HRT SHP WRE HORZ 600 PER BX

## (undated) DEVICE — DRESSING FOAM 4.25IN LEN 12.5IN W WND PD N

## (undated) DEVICE — PACK ASSRY CUSTOM TUBING

## (undated) DEVICE — SUTURE SILK 2-0 SA85H

## (undated) DEVICE — DRAIN SILICONE FLAT 10MM

## (undated) DEVICE — SUTURE SILK 2-0 685H

## (undated) DEVICE — SOL H2O 1000ML BTL

## (undated) DEVICE — BATTERY PACK FOR VARISPEED: Brand: STRYKER VARISPEED

## (undated) DEVICE — STERILE LATEX POWDER-FREE SURGICAL GLOVESWITH NITRILE COATING: Brand: PROTEXIS

## (undated) DEVICE — ADHESIVE MASTISOL 2/3CC VL

## (undated) DEVICE — ETS45 RELOAD STANDARD 45MM: Brand: ENDOPATH

## (undated) DEVICE — VISUALIZATION SYSTEM: Brand: CLEARIFY

## (undated) DEVICE — FORESIGHT LARGE SENSOR: Brand: FORESIGHT

## (undated) DEVICE — MEDI-VAC NON-CONDUCTIVE SUCTION TUBING 6MM X 1.8M (6FT.) L: Brand: CARDINAL HEALTH

## (undated) DEVICE — Device

## (undated) DEVICE — BNDG,ELSTC,MATRIX,STRL,6"X5YD,LF,HOOK&LP: Brand: MEDLINE

## (undated) DEVICE — SUT VCRL 1-0 36IN CTX ABSRB UD L48MM 1/2 CIR

## (undated) DEVICE — CS5/5+ FASTPACK, 225ML 150U RES: Brand: HAEMONETICS CELL SAVER 5/5+ SYSTEMS

## (undated) DEVICE — 60 ML SYRINGE REGULAR TIP: Brand: MONOJECT

## (undated) DEVICE — TROCAR: Brand: KII FIOS FIRST ENTRY

## (undated) DEVICE — [HIGH FLOW INSUFFLATOR,  DO NOT USE IF PACKAGE IS DAMAGED,  KEEP DRY,  KEEP AWAY FROM SUNLIGHT,  PROTECT FROM HEAT AND RADIOACTIVE SOURCES.]: Brand: PNEUMOSURE

## (undated) DEVICE — CLIP HEMOLOK LARGE PURPLE

## (undated) DEVICE — 40580 - THE PINK PAD - ADVANCED TRENDELENBURG POSITIONING KIT: Brand: 40580 - THE PINK PAD - ADVANCED TRENDELENBURG POSITIONING KIT

## (undated) DEVICE — STAPLER 45 RELOAD: Brand: ENDOWRIST

## (undated) DEVICE — UNDYED BRAIDED (POLYGLACTIN 910), SYNTHETIC ABSORBABLE SUTURE: Brand: COATED VICRYL

## (undated) DEVICE — MATTRESS PT HOVERMATT 1/2L 34W

## (undated) DEVICE — DV SEAL CANNULA 8.5-13MM

## (undated) DEVICE — CATHETER THOR 32FR L23IN R ANG CLR PVC HEP

## (undated) DEVICE — SUT PERMAHAND 1 L30IN N ABSRB BLK TIE SILK

## (undated) DEVICE — HEART DRAPE AND SUPPLY: Brand: MEDLINE INDUSTRIES, INC.

## (undated) DEVICE — SUT PROL 4-0 36IN RB-1 NABSRB BLU 17MM 1/2 CI

## (undated) DEVICE — TOWEL OR BLU 16X26 STRL

## (undated) DEVICE — CULTURE TUBE ANAEROBIC

## (undated) DEVICE — VESSEL SEALER: Brand: ENDOWRIST;DAVINCI SI

## (undated) DEVICE — KIT CLEAN ENDOKIT 1.1OZ GOWNX2

## (undated) DEVICE — SUT PROL 6-0 18IN C-1 NABSRB BLU 13MM 3/8 CIR

## (undated) DEVICE — DOUBLE FENESTRATED GRASPER: Brand: ENDOWRIST;DAVINCI SI

## (undated) DEVICE — SUTURE SILK 2-0 SH

## (undated) DEVICE — PERCUTANEOUS INSERTION KIT-ARTERIAL: Brand: PERCUTANEOUS INSERTION KIT-ARTERIAL

## (undated) DEVICE — PROVE COVER: Brand: UNBRANDED

## (undated) DEVICE — 3M™ BAIR HUGGER® UNDERBODY BLANKET, FULL ACCESS, 10 PER CASE 63500: Brand: BAIR HUGGER™

## (undated) DEVICE — ABSORBABLE HEMOSTAT (OXIDIZED REGENERATED CELLULOSE): Brand: SURGICEL NU-KNIT

## (undated) DEVICE — SUT 6 DBL WIRE 14IN CCS-1 NABSRB L49MM 1/2 CI

## (undated) DEVICE — SUT PROL 7-0 18IN BV-1 NABSRB BLU L9.3MM 3/8

## (undated) DEVICE — GAMMEX® PI HYBRID SIZE 8, STERILE POWDER-FREE SURGICAL GLOVE, POLYISOPRENE AND NEOPRENE BLEND: Brand: GAMMEX

## (undated) DEVICE — SUT VCRL 2-0 36IN CT-1 ABSRB UD L36MM 1/2 CIR

## (undated) DEVICE — SUCTION CANISTER, 3000CC,SAFELINER: Brand: DEROYAL

## (undated) DEVICE — SOLUTION IV 1000ML 0.9% NACL PRESERVATIVE

## (undated) DEVICE — CLIP INT USE SM TI LIG HORZ

## (undated) DEVICE — MEDI-VAC NON-CONDUCTIVE SUCTION TUBING: Brand: CARDINAL HEALTH

## (undated) DEVICE — DV KIT ACCESSORY 4-ARM

## (undated) DEVICE — PACK PERFUSION CUSTOM W BCARE5

## (undated) DEVICE — CANNULA SUCT L15IN DIA32/40FR NVENT CONN

## (undated) DEVICE — SUT ETHBND XL 0 30IN CT-1 NABSRB GRN 36MM 1/2

## (undated) DEVICE — CANNULA PERF PED AD 9FR L5.5IN AORT ROOT FLNG

## (undated) DEVICE — GIJAW SINGLE-USE BIOPSY FORCEPS WITH NEEDLE: Brand: GIJAW

## (undated) DEVICE — BNDG,ELSTC,MATRIX,STRL,4"X5YD,LF,HOOK&LP: Brand: MEDLINE

## (undated) DEVICE — PROVIDES A STERILE INTERFACE BETWEEN THE OPERATING ROOM SURGICAL LAMPS (NON-STERILE) AND THE SURGEON OR NURSE (STERILE).: Brand: STERION®CLAMP COVER FABRIC

## (undated) DEVICE — GOWN SURG AERO BLUE PERF LG

## (undated) DEVICE — DRAIN RESERVOIR RELIAVAC 100CC

## (undated) DEVICE — INSUFFLATION NEEDLE TO ESTABLISH PNEUMOPERITONEUM.: Brand: INSUFFLATION NEEDLE

## (undated) DEVICE — SUTURE VICRYL 0 J906G

## (undated) DEVICE — TISSUE RETRIEVAL SYSTEM: Brand: INZII RETRIEVAL SYSTEM

## (undated) DEVICE — SOL  .9 3000ML

## (undated) DEVICE — ROBOTIC: Brand: MEDLINE INDUSTRIES, INC.

## (undated) DEVICE — DRAIN CHST SGL COLL 1 PT TB FOR ATS BG CMPTBL

## (undated) DEVICE — SOLUTION IRRIG 1000ML 0.9% NACL USP BTL

## (undated) DEVICE — PISTOL GRIP SKIN STAPLER: Brand: MULTIFIRE PREMIUM

## (undated) DEVICE — DRAPE DISC W112XL168CM DISP FOR HUSH SLUSH

## (undated) DEVICE — ADAPTER CRDPLG ANTGRD RTRGD 3W

## (undated) DEVICE — V2 SPECIMEN COLLECTION MANIFOLD KIT: Brand: NEPTUNE

## (undated) DEVICE — SUT PROL 5-0 24IN C-1 NABSRB BLU 13MM 3/8 CIR

## (undated) DEVICE — A STERILE, SEMI-RIGID TUBE USED IN OPEN HEART SURGERY TO FACILITATE THE TRANSFER OF PRIMING FLUIDS DURING THE PRIMING OF THE EXTRACORPOREAL CIRCUIT OF A CARDIOPULMONARY BYPASS SYSTEM. IT IS A NONINVASIVE PRODUCT IN THE FORM OF A TUBE OR TUBING USED TO CHANNEL THE FLUIDS (I.E., PRIMER/BLOOD) TO FACILITATE PRIMING OF THE EXTRACORPOREAL CIRCUIT AND CONNECTION OF THE CARDIOPULMONARY BYPASS SYSTEM (HEART/LUNG MACHINE) TO THE PATIENT. IT IS TYPICALLY A MOULDED PLASTIC TUBE WITH HARD PLASTIC SPIKE AT THE DISTAL END THAT CONNECTS TO THE BAG OR CONTAINER OF PRIMER SOLUTION, A CONNECTOR AT THE PROXIMAL END, AND A CENTRAL PINCH CLAMP. THIS IS A SINGLE-USE DEVICE.: Brand: QUICKIE PRIME - 1/4" X 1/16"

## (undated) DEVICE — DEVICE BLWR/MSTR ACCUMIST ATCH

## (undated) DEVICE — SUT PERMA- 2-0 18IN SH NABSRB BLK CR 26MM

## (undated) DEVICE — LEAD PACE 475MM CHN A OR V MYOCARDIAL STEROID

## (undated) DEVICE — DECANTER SPIKE TRANSFLOW

## (undated) DEVICE — TIP COVER ACCESSORY

## (undated) DEVICE — STAPLER SHEATH: Brand: ENDOWRIST

## (undated) DEVICE — STAPLER 45: Brand: ENDOWRIST;DAVINCI SI

## (undated) DEVICE — SUTURE SILK 0

## (undated) DEVICE — DRAPE SHEET LAPCHOLE 124X100X7

## (undated) DEVICE — SET TRNQT SUR 12FR TWO 1 BLU 1 SNR DLP

## (undated) DEVICE — STANDARD HYPODERMIC NEEDLE,POLYPROPYLENE HUB: Brand: MONOJECT

## (undated) DEVICE — SUTURE VLOC 180 2-0 9\" 2145

## (undated) DEVICE — 12 FOOT DISPOSABLE EXTENSION CABLE WITH SAFE CONNECT / SCREW-DOWN

## (undated) DEVICE — TOURNIQUET 12FR L7IN 3 CLR 1 SNR VENA CAVA

## (undated) DEVICE — SUT MCRYL 4-0 18IN PS-2 ABSRB UD 19MM 3/8 CIR

## (undated) DEVICE — SUT PROL 3-0 30IN NABSRB BLU 22MM SH-1 1/2

## (undated) DEVICE — SUT MCRYL 3-0 27IN ABSRB UD L24MM PS-1

## (undated) DEVICE — SUT PROL 6-0 24IN C-1 NABSRB BLU 13MM 3/8 CIR

## (undated) DEVICE — CULTURE COLLECT/TRANSPORT SYS

## (undated) DEVICE — 12 ML SYRINGE LUER-LOCK TIP: Brand: MONOJECT

## (undated) DEVICE — HEART A: Brand: MEDLINE INDUSTRIES, INC.

## (undated) DEVICE — SUTURE VICRYL 3-0 J442H

## (undated) DEVICE — SOL  .9 1000ML BTL

## (undated) DEVICE — SUTURE PDS II 0 CTX

## (undated) DEVICE — TROCAR: Brand: KII® SLEEVE

## (undated) DEVICE — LAP CHOLE: Brand: MEDLINE INDUSTRIES, INC.

## (undated) NOTE — LETTER
[facility] [address], [place], [place]    Authorization for Surgical Operation and Procedure                               I hereby authorize Delilah Armenta MD, my physician and his/her assistants (if applicable), which may include medical students, residents, and/or fellows, to perform the following surgical operation/ procedure and administer such anesthesia as may be determined necessary by my physician: Operation/Procedure name (s) COLONOSCOPY on Olimpia Medina   2.   I recognize that during the surgical operation/procedure, unforeseen conditions may necessitate additional or different procedures than those listed above.  I, therefore, further authorize and request that the above-named surgeon, assistants, or designees perform such procedures as are, in their judgment, necessary and desirable.    3.   My surgeon/physician has discussed prior to my surgery the potential benefits, risks and side effects of this procedure; the likelihood of achieving goals; and potential problems that might occur during recuperation.  They also discussed reasonable alternatives to the procedure, including risks, benefits, and side effects related to the alternatives and risks related to not receiving this procedure.  I have had all my questions answered and I acknowledge that no guarantee has been made as to the result that may be obtained.    4.   Should the need arise during my operation/procedure, which includes change of level of care prior to discharge, I also consent to the administration of blood and/or blood products.  Further, I understand that despite careful testing and screening of blood or blood products by collecting agencies, I may still be subject to ill effects as a result of receiving a blood transfusion and/or blood products.  The following are some, but not all, of the potential risks that can occur: fever and allergic reactions, hemolytic reactions, transmission of diseases such as  Hepatitis, AIDS and Cytomegalovirus (CMV) and fluid overload.  In the event that I wish to have an autologous transfusion of my own blood, or a directed donor transfusion, I will discuss this with my physician.  Check only if Refusing Blood or Blood Products  I understand refusal of blood or blood products as deemed necessary by my physician may have serious consequences to my condition to include possible death. I hereby assume responsibility for my refusal and release the hospital, its personnel, and my physicians from any responsibility for the consequences of my refusal.    o  Refuse   5.   I authorize the use of any specimen, organs, tissues, body parts or foreign objects that may be removed from my body during the operation/procedure for diagnosis, research or teaching purposes and their subsequent disposal by hospital authorities.  I also authorize the release of specimen test results and/or written reports to my treating physician on the hospital medical staff or other referring or consulting physicians involved in my care, at the discretion of the Pathologist or my treating physician.    6.   I consent to the photographing or videotaping of the operations or procedures to be performed, including appropriate portions of my body for medical, scientific, or educational purposes, provided my identity is not revealed by the pictures or by descriptive texts accompanying them.  If the procedure has been photographed/videotaped, the surgeon will obtain the original picture, image, videotape or CD.  The hospital will not be responsible for storage, release or maintenance of the picture, image, tape or CD.    7.   I consent to the presence of a  or observers in the operating room as deemed necessary by my physician or their designees.    8.   I recognize that in the event my procedure results in extended X-Ray/fluoroscopy time, I may develop a skin reaction.    9. If I have a Do Not Attempt  Resuscitation (DNAR) order in place, that status will be suspended while in the operating room, procedural suite, and during the recovery period unless otherwise explicitly stated by me (or a person authorized to consent on my behalf). The surgeon or my attending physician will determine when the applicable recovery period ends for purposes of reinstating the DNAR order.  10. Patients having a sterilization procedure: I understand that if the procedure is successful the results will be permanent and it will therefore be impossible for me to inseminate, conceive, or bear children.  I also understand that the procedure is intended to result in sterility, although the result has not been guaranteed.   11. I acknowledge that my physician has explained sedation/analgesia administration to me including the risk and benefits I consent to the administration of sedation/analgesia as may be necessary or desirable in the judgment of my physician.    I CERTIFY THAT I HAVE READ AND FULLY UNDERSTAND THE ABOVE CONSENT TO OPERATION and/or OTHER PROCEDURE.     ____________________________________  _________________________________        ______________________________  Signature of Patient    Signature of Responsible Person                Printed Name of Responsible Person                                      ____________________________________  _____________________________                ________________________________  Signature of Witness        Date  Time         Relationship to Patient    STATEMENT OF PHYSICIAN My signature below affirms that prior to the time of the procedure; I have explained to the patient and/or his/her legal representative, the risks and benefits involved in the proposed treatment and any reasonable alternative to the proposed treatment. I have also explained the risks and benefits involved in refusal of the proposed treatment and alternatives to the proposed treatment and have answered the patient's  questions. If I have a significant financial interest in a co-management agreement or a significant financial interest in any product or implant, or other significant relationship used in this procedure/surgery, I have disclosed this and had a discussion with my patient.     _____________________________________________________              _____________________________  (Signature of Physician)                                                                                         (Date)                                   (Time)  Patient Name: Olimpia Medina      : 1965      Printed: 2025     Medical Record #: [id number]                                      Page 1 of 1

## (undated) NOTE — LETTER
AUTHORIZATION FOR SURGICAL OPERATION OR OTHER PROCEDURE    1. I hereby authorize Leslie Carrion, and [facility] staff assigned to my case to perform the following operation and/or procedure at the [facility] site:    _______________________________________________________________________________________________    right hallux total avulsion with chemical matrixectomy   _______________________________________________________________________________________________    2.  My physician has explained the nature and purpose of the operation or other procedure, possible alternative methods of treatment, the risks involved, and the possibility of complication to me.  I acknowledge that no guarantee has been made as to the result that may be obtained.  3.  I recognize that, during the course of this operation, or other procedure, unforseen conditions may necessitate additional or different procedure than those listed above.  I, therefore, further authorize and request that the above named physician, his/her physician assistants or designees perform such procedures as are, in his/her professional opinion, necessary and desirable.  4.  Any tissue or organs removed in the operation or other procedure may be disposed of by and at the discretion of the [facility] and [facility].  5.  I understand that in the event of a medical emergency, I will be transported by local paramedics to [facility] or other hospital emergency department.  6.  I certify that I have read and fully understand the above consent to operation and/or other procedure.    7.  I acknowledge that my physician has explained sedation/analgesia administration to me including the risks and benefits.  I consent to the administration of sedation/analgesia as may be necessary or desirable in the judgement of my physician.    Witness signature:  ___________________________________________________ Date:  ______/______/_____                    Time:  ________ A.M.  P.M.       Patient Name:  ______________________________________________________  (please print)      Patient signature:  ___________________________________________________             Relationship to Patient:           []  Parent    Responsible person                          []  Spouse  In case of minor or                    [] Other  _____________   Incompetent name:  __________________________________________________                               (please print)      _____________      Responsible person  In case of minor or  Incompetent signature:  _______________________________________________    Statement of Physician  My signature below affirms that prior to the time of the procedure, I have explained to the patient and/or his/her guardian, the risks and benefits involved in the proposed treatment and any reasonable alternative to the proposed treatment.  I have also explained the risks and benefits involved in the refusal of the proposed treatment and have answered the patient's questions.                        Date:  ______/______/_______  Provider                      Signature:  __________________________________________________________       Time:  ___________ FRANCES FISHER

## (undated) NOTE — LETTER
AUTHORIZATION FOR SURGICAL OPERATION OR OTHER PROCEDURE    1.  I hereby authorize Dr. Holli Barreto, and [facility] staff assigned to my case to perform the following operation and/or procedure at the [facility]:    ________________________________ ___________________________________________________ Date:  ______/______/_____                    Time:  ________ A. M.  P.M.        Patient Name:  ______________________________________________________  (please print)      Patient signature:  ______________

## (undated) NOTE — LETTER
[facility]  Administration of Anesthesia  IOlimpia agree to be cared for by a physician anesthesiologist alone and/or with a nurse anesthetist, who is specially trained to monitor me and give me medicine to put me to sleep or keep me comfortable during my procedure    I understand that my anesthesiologist and/or anesthetist is not an employee or agent of [facility] or [employer]. He or she works for [facility]    As the patient asking for anesthesia services, I agree to:  Allow the anesthesiologist (anesthesia doctor) to give me medicine and do additional procedures as necessary. Some examples are: Starting or using an “IV” to give me medicine, fluids or blood during my procedure, and having a breathing tube placed to help me breathe when I’m asleep (intubation). In the event that my heart stops working properly, I understand that my anesthesiologist will make every effort to sustain my life, unless otherwise directed by [facility] Do Not Resuscitate documents.  Tell my anesthesia doctor before my procedure:  If I am pregnant.  The last time that I ate or drank.  iii. All of the medicines I take (including prescriptions, herbal supplements, and pills I can buy without a prescription (including street drugs/illegal medications). Failure to inform my anesthesiologist about these medicines may increase my risk of anesthetic complications.  iv.If I am allergic to anything or have had a reaction to anesthesia before.  I understand how the anesthesia medicine will help me (benefits).  I understand that with any type of anesthesia medicine there are risks:  The most common risks are: nausea, vomiting, sore throat, muscle soreness, damage to my eyes, mouth, or teeth (from breathing tube placement).  Rare risks include: remembering what happened during my procedure, allergic reactions to medications, injury to my airway, heart, lungs, vision, nerves, or  muscles and in extremely rare instances death.  My doctor has explained to me other choices available to me for my care (alternatives).  Pregnant Patients (“epidural”):  I understand that the risks of having an epidural (medicine given into my back to help control pain during labor), include itching, low blood pressure, difficulty urinating, headache or slowing of the baby’s heart. Very rare risks include infection, bleeding, seizure, irregular heart rhythms and nerve injury.  Regional Anesthesia (“spinal”, “epidural”, & “nerve blocks”):  I understand that rare but potential complications include headache, bleeding, infection, seizure, irregular heart rhythms, and nerve injury.    _____________________________________________________________________________  Patient (or Representative) Signature/Relationship to Patient  Date   Time    _____________________________________________________________________________   Name (if used)    Language/Organization   Time    _____________________________________________________________________________  Nurse Anesthetist Signature     Date   Time  _____________________________________________________________________________  Anesthesiologist Signature     Date   Time  I have discussed the procedure and information above with the patient (or patient’s representative) and answered their questions. The patient or their representative has agreed to have anesthesia services.    _____________________________________________________________________________  Witness        Date   Time  I have verified that the signature is that of the patient or patient’s representative, and that it was signed before the procedure  Patient Name: Olimpia Medina     : 1965                 Printed: 2024 at 12:16 PM    Medical Record #: [id number]                                            Page 1 of 1  ----------ANESTHESIA CONSENT----------

## (undated) NOTE — LETTER
[facility] [address], [place], [place]    Authorization for Surgical Operation and Procedure                               I hereby authorize Alfredito Caruso MD, my physician and his/her assistants (if applicable), which may include medical students, residents, and/or fellows, to perform the following surgical operation/ procedure and administer such anesthesia as may be determined necessary by my physician: Operation/Procedure name (s) CORONARY ARTERY BYPASS GRAFT; POSSIBLE ENDOSCOPIC VEIN HARVERST on Olimpia Medina   2.   I recognize that during the surgical operation/procedure, unforeseen conditions may necessitate additional or different procedures than those listed above.  I, therefore, further authorize and request that the above-named surgeon, assistants, or designees perform such procedures as are, in their judgment, necessary and desirable.    3.   My surgeon/physician has discussed prior to my surgery the potential benefits, risks and side effects of this procedure; the likelihood of achieving goals; and potential problems that might occur during recuperation.  They also discussed reasonable alternatives to the procedure, including risks, benefits, and side effects related to the alternatives and risks related to not receiving this procedure.  I have had all my questions answered and I acknowledge that no guarantee has been made as to the result that may be obtained.    4.   Should the need arise during my operation/procedure, which includes change of level of care prior to discharge, I also consent to the administration of blood and/or blood products.  Further, I understand that despite careful testing and screening of blood or blood products by collecting agencies, I may still be subject to ill effects as a result of receiving a blood transfusion and/or blood products.  The following are some, but not all, of the potential risks that can occur: fever and allergic reactions,  hemolytic reactions, transmission of diseases such as Hepatitis, AIDS and Cytomegalovirus (CMV) and fluid overload.  In the event that I wish to have an autologous transfusion of my own blood, or a directed donor transfusion, I will discuss this with my physician.  Check only if Refusing Blood or Blood Products  I understand refusal of blood or blood products as deemed necessary by my physician may have serious consequences to my condition to include possible death. I hereby assume responsibility for my refusal and release the hospital, its personnel, and my physicians from any responsibility for the consequences of my refusal.    o  Refuse   5.   I authorize the use of any specimen, organs, tissues, body parts or foreign objects that may be removed from my body during the operation/procedure for diagnosis, research or teaching purposes and their subsequent disposal by hospital authorities.  I also authorize the release of specimen test results and/or written reports to my treating physician on the hospital medical staff or other referring or consulting physicians involved in my care, at the discretion of the Pathologist or my treating physician.    6.   I consent to the photographing or videotaping of the operations or procedures to be performed, including appropriate portions of my body for medical, scientific, or educational purposes, provided my identity is not revealed by the pictures or by descriptive texts accompanying them.  If the procedure has been photographed/videotaped, the surgeon will obtain the original picture, image, videotape or CD.  The hospital will not be responsible for storage, release or maintenance of the picture, image, tape or CD.    7.   I consent to the presence of a  or observers in the operating room as deemed necessary by my physician or their designees.    8.   I recognize that in the event my procedure results in extended X-Ray/fluoroscopy time, I may develop a  skin reaction.    9. If I have a Do Not Attempt Resuscitation (DNAR) order in place, that status will be suspended while in the operating room, procedural suite, and during the recovery period unless otherwise explicitly stated by me (or a person authorized to consent on my behalf). The surgeon or my attending physician will determine when the applicable recovery period ends for purposes of reinstating the DNAR order.  10. Patients having a sterilization procedure: I understand that if the procedure is successful the results will be permanent and it will therefore be impossible for me to inseminate, conceive, or bear children.  I also understand that the procedure is intended to result in sterility, although the result has not been guaranteed.   11. I acknowledge that my physician has explained sedation/analgesia administration to me including the risk and benefits I consent to the administration of sedation/analgesia as may be necessary or desirable in the judgment of my physician.    I CERTIFY THAT I HAVE READ AND FULLY UNDERSTAND THE ABOVE CONSENT TO OPERATION and/or OTHER PROCEDURE.     ____________________________________  _________________________________        ______________________________  Signature of Patient    Signature of Responsible Person                Printed Name of Responsible Person                                      ____________________________________  _____________________________                ________________________________  Signature of Witness        Date  Time         Relationship to Patient    STATEMENT OF PHYSICIAN My signature below affirms that prior to the time of the procedure; I have explained to the patient and/or his/her legal representative, the risks and benefits involved in the proposed treatment and any reasonable alternative to the proposed treatment. I have also explained the risks and benefits involved in refusal of the proposed treatment and alternatives to the  proposed treatment and have answered the patient's questions. If I have a significant financial interest in a co-management agreement or a significant financial interest in any product or implant, or other significant relationship used in this procedure/surgery, I have disclosed this and had a discussion with my patient.     _____________________________________________________              _____________________________  (Signature of Physician)                                                                                         (Date)                                   (Time)  Patient Name: Olimpia Trevino Adam      : 1965      Printed: 2024     Medical Record #: [id number]                                      Page 1 of 1

## (undated) NOTE — LETTER
[facility]  Administration of Anesthesia  IOlimpia agree to be cared for by a physician anesthesiologist alone and/or with a nurse anesthetist, who is specially trained to monitor me and give me medicine to put me to sleep or keep me comfortable during my procedure    I understand that my anesthesiologist and/or anesthetist is not an employee or agent of [facility] or [employer]. He or she works for [facility] [facility]    As the patient asking for anesthesia services, I agree to:  Allow the anesthesiologist (anesthesia doctor) to give me medicine and do additional procedures as necessary. Some examples are: Starting or using an “IV” to give me medicine, fluids or blood during my procedure, and having a breathing tube placed to help me breathe when I’m asleep (intubation). In the event that my heart stops working properly, I understand that my anesthesiologist will make every effort to sustain my life, unless otherwise directed by [facility] Do Not Resuscitate documents.  Tell my anesthesia doctor before my procedure:  If I am pregnant.  The last time that I ate or drank.  iii. All of the medicines I take (including prescriptions, herbal supplements, and pills I can buy without a prescription (including street drugs/illegal medications). Failure to inform my anesthesiologist about these medicines may increase my risk of anesthetic complications.  iv.If I am allergic to anything or have had a reaction to anesthesia before.  I understand how the anesthesia medicine will help me (benefits).  I understand that with any type of anesthesia medicine there are risks:  The most common risks are: nausea, vomiting, sore throat, muscle soreness, damage to my eyes, mouth, or teeth (from breathing tube placement).  Rare risks include: remembering what happened during my procedure, allergic reactions to medications, injury to my airway, heart, lungs, vision, nerves, or  muscles and in extremely rare instances death.  My doctor has explained to me other choices available to me for my care (alternatives).  Pregnant Patients (“epidural”):  I understand that the risks of having an epidural (medicine given into my back to help control pain during labor), include itching, low blood pressure, difficulty urinating, headache or slowing of the baby’s heart. Very rare risks include infection, bleeding, seizure, irregular heart rhythms and nerve injury.  Regional Anesthesia (“spinal”, “epidural”, & “nerve blocks”):  I understand that rare but potential complications include headache, bleeding, infection, seizure, irregular heart rhythms, and nerve injury.    _____________________________________________________________________________  Patient (or Representative) Signature/Relationship to Patient  Date   Time    _____________________________________________________________________________   Name (if used)    Language/Organization   Time    _____________________________________________________________________________  Nurse Anesthetist Signature     Date   Time  _____________________________________________________________________________  Anesthesiologist Signature     Date   Time  I have discussed the procedure and information above with the patient (or patient’s representative) and answered their questions. The patient or their representative has agreed to have anesthesia services.    _____________________________________________________________________________  Witness        Date   Time  I have verified that the signature is that of the patient or patient’s representative, and that it was signed before the procedure  Patient Name: Olimpia Medina     : 1965                 Printed: 2025 at 2:44 PM    Medical Record #: [id number]                                            Page 1 of 1  ----------ANESTHESIA CONSENT----------

## (undated) NOTE — LETTER
[facility] INTERVENTIONAL SUITES  [address] [place] [address]  [phone]    Blood Transfusion Consent    In the course of your treatment, it may become necessary to administer a transfusion of blood or blood components. This form provides basic information concerning this procedure and, if signed by you, authorizes its administration. By signing this form, you agree that all of your questions about the administration of blood or blood products have been answered by the ordering medical professional or designee.    Description of Procedure  Blood is introduced into one of your veins, commonly in the arm, using a sterilized disposable needle. The amount of blood transfused, and whether the transfusion will be of blood or blood components is a judgement the physician will make based on your particular needs.    Risks  The transfusion is a common procedure of low risk.  MINOR AND TEMPORARY REACTIONS ARE NOT UNCOMMON, including a slight bruise, swelling or local reaction in the area where the needle pierces your skin, or a nonserious reaction to the transfused material itself, including headache, fever or mild skin reaction, such as rash.  Serious reactions are possible, though very unlikely, and include severe allergic reaction (shock) and destruction (hemolysis) of transfused blood cells.  Infectious diseases which are known to be transmitted by blood transfusion include certain types of viral Hepatitis(liver infection from a virus), Human Immunodeficiency Virus (HIV-1,2) infection, a viral infection known to cause Acquired Immunodeficiency Syndrome (AIDS), as well as certain other bacterial, viral, and parasitic diseases. While a minimal risk of acquiring an infectious disease from transfused blood exists, in accordance with the Federal and State law, all due care has been taken in donor selection and testing to avoid transmission of disease.    Alternatives  If loss of blood poses serious threats  during your treatment, THERE IS NO EFFECTIVE ALTERNATIVE TO BLOOD TRANSFUSION. However, if you have any further questions on this matter, your provider will fully explain the alternatives to you if it has not already been done.    I, ______________________________, have read/had read to me the above. I understand the matters bearing on the decision whether or not to authorize a transfusion of blood or blood components. I have no questions which have not been answered to my full satisfaction. I hereby consent to such transfusion as my physician may deem necessary or advisable in the course of my treatment.    ______________________________________________                    ___________________________  (Signature of Patient or Responsible party in case of minor,                 (Printed Name of Patient or incompetent, or unconscious patient)              Responsible Party)    ___________________________               _____________________  (Relationship to Patient if not self)                                    (Date and Time)    __________________________                                                           ______________________              (Signature of Witness)               (Printed Name of Witness)     Language line ()    Telephone/Verbal/Video Consent    __________________________                     ____________________  (Signature of 2nd Witness           (Printed Name of 2nd  Telephone/Verbal/Video Consent)           Witness)    Patient Name: Olimpia Medina     : 1965                 Printed: 2024     Medical Record #: [id number]      Rev: 2023

## (undated) NOTE — LETTER
7/1/2025        Olimpia Medina        [address] [place] [address]         To Whom It May Concern,      I'm writing this letter on behalf of my patient OLIMPIA MEDINA to express a concern. My patient is in need of Zepbound / Wegovy  injection that has been denied by your insurance plan.     I'm respectfully requesting coverage of Zepbound or Wegovy for patient who has been diagnosed with obesity with weight related co-morbidties. Her original Wt 241 lb 6.4 oz (109.5 kg)  SpO2 100%  BMI 35.65 kg/m² co-morbidties are: hyperlipidemia, HTN, and was recently found to have very elevated calcium score and significant  coronary artery disease requiring CABG Jan//2025.      Zepbound and Wegovy were approved by FDA as adjunct to a reduced calorie diet and increased physical activity for chronic weight management in adult patients with initial BMI of 30 kg/m2, or an adult with BMI of 27kg/m2 in the presence of at least one co-morbidity    They were also approved to be used to reduce cardiovascular risk and cardio metabolic markers which are critical in reducing any future cardiovascular events particularly relevant  to Olimpia with established CAD, high risk family history of CAD( mom and grandmother) obesity class 2 and inadequate response to physician administered diet plan and exercise.      Without access to effective medical treatment such as wegovy or zepbound, the patient remains at high risk for disease progression and costly acute events including myocardial infarction and heart failure.    I urge you to reconsider this appeal and approve Zepbound or Wegovy as a medically necessary intervention to mitigate long tem cardiovascular morbidity and mortality.      Please feel free to contact me if you have any questions.          Sincerely,    Tori Samuels MD  [address] [address]  Ph: [phone]  Fax: [phone]        Document electronically generated by:  Tori Samuels MD

## (undated) NOTE — LETTER
[facility] ULTRASOUND  [address]  [place] [address]  [phone]  Authorization for Imaging Procedure  Date of Procedure: 03/27/25    I hereby authorize  ________________, my physician and his/her assistants (if applicable), which may include medical students, residents, and/or fellows, to perform the following procedure and administer such anesthesia as may be determined necessary by my physician: ULTRASOUND GUIDED FINE NEEDLE ASPIRATION LEFT THYROID NODULE, FIRST LESION on Olimpia Medina.   2.  I recognize that during the procedure, unforeseen conditions may necessitate additional or different procedures than those listed above. I, therefore, further authorize and request that the above-named physician, assistants, or designees perform such procedures as are, in their judgment, necessary and desirable.    3.  My physician has discussed prior to my procedure the potential benefits, risks and side effects of this procedure; the likelihood of achieving goals; and potential problems that might occur during recuperation. They also discussed reasonable alternatives to the procedure, including risks, benefits, and side effects related to the alternatives and risks related to not receiving this procedure. I have had all my questions answered and I acknowledge that no guarantee has been made as to the result that may be obtained.    4.  Should the need arise during my procedure, which includes change of level of care prior to discharge, I also consent to the administration of blood and/or blood products. Further, I understand that despite careful testing and screening of blood or blood products by collecting agencies, I may still be subject to ill effects as a result of receiving a blood transfusion and/or blood products. The following are some, but not all, of the potential risks that can occur: fever and allergic reactions, hemolytic reactions, transmission of diseases such as Hepatitis, AIDS and  Cytomegalovirus (CMV) and fluid overload. In the event that I wish to have an autologous transfusion of my own blood, or a directed donor transfusion, I will discuss this with my physician.  Check only if Refusing Blood or Blood Products  I understand refusal of blood or blood products as deemed necessary by my physician may have serious consequences to my condition to include possible death. I hereby assume responsibility for my refusal and release the hospital, its personnel, and my physicians from any responsibility for the consequences of my refusal.   [  ] Patient Refuses Blood      5.  I authorize the use of any specimen, organs, tissues, body parts or foreign objects that may be removed from my body during the procedure for diagnosis, research or teaching purposes and their subsequent disposal by hospital authorities. I also authorize the release of specimen test results and/or written reports to my treating physician on the hospital medical staff or other referring or consulting physicians involved in my care, at the discretion of the Pathologist or my treating physician.    6.  I consent to the photographing or videotaping of the procedures to be performed, including appropriate portions of my body for medical, scientific, or educational purposes, provided my identity is not revealed by the pictures or by descriptive texts accompanying them. If the procedure has been photographed/videotaped, the physician will obtain the original picture, image, videotape or CD. The hospital will not be responsible for storage, release or maintenance of the picture, image, tape or CD.   7.  I consent to the presence of a  or observers in the operating room as deemed necessary by my physician or their designees.    8.  I recognize that in the event my procedure results in extended X-Ray/fluoroscopy time, I may develop a skin reaction.    9.  If I have a Do Not Attempt Resuscitation (DNAR) order in place,  that status will be suspended while in the operating room, procedural suite, and during the recovery period unless otherwise explicitly stated by me (or a person authorized to consent on my behalf). The performing physician or my attending physician will determine when the applicable recovery period ends for purposes of reinstating the DNAR order.  10.  I acknowledge that my physician has explained sedation/analgesia administration to me including the risk and benefits I consent to the administration of sedation/analgesia as may be necessary or desirable in the judgment of my physician.      I CERTIFY THAT I HAVE READ AND FULLY UNDERSTAND THE ABOVE CONSENT FOR THE PROCEDURE.   Signature of Patient: _____________________________________________________________  Responsible person in case of minor, unconscious: ____________________________________  Relationship to patient:  __________________________________________________________  Signature of Witness: _______________________________Date: _________Time: __________    Statement of Physician: My signature below affirms that prior to the time of the procedure, I have explained to the patient and/or her guardian, the risks and benefits involved in the proposed treatment and any reasonable alternative to the proposed treatment. I have also explained the risks and benefits involved in the refusal of the proposed treatment and have answered the patient's questions. If I have a significant financial interest in a co-management agreement or a significant financial interest in any product or implant, or other significant relationship used in the procedure/surgery, I have disclosed this and had a discussion with my patient.  Signature of Physician:   _________________________________Date:_____________Time:________  Patient Name: Ana MaríaBelinda Medina : 1965  Printed: 2025   Medical Record #: [id number]

## (undated) NOTE — LETTER
AUTHORIZATION FOR SURGICAL OPERATION OR OTHER PROCEDURE    1.  I hereby authorize Dr. Andreas Allen  and [facility] staff assigned to my case to perform the following operation and/or procedure at the [facility]:    Correction of ingrown to Date:  ______/______/_____                    Time:  ________ A. M.  P.M.        Patient Name:  ______________________________________________________  (please print)      Patient signature:  ___________________________________________________             Re